# Patient Record
Sex: FEMALE | Race: WHITE | Employment: FULL TIME | URBAN - METROPOLITAN AREA
[De-identification: names, ages, dates, MRNs, and addresses within clinical notes are randomized per-mention and may not be internally consistent; named-entity substitution may affect disease eponyms.]

---

## 2017-01-19 ENCOUNTER — TELEPHONE (OUTPATIENT)
Dept: FAMILY MEDICINE | Facility: CLINIC | Age: 57
End: 2017-01-19

## 2017-01-19 DIAGNOSIS — K21.9 GASTROESOPHAGEAL REFLUX DISEASE, ESOPHAGITIS PRESENCE NOT SPECIFIED: Primary | ICD-10-CM

## 2017-01-19 NOTE — TELEPHONE ENCOUNTER
Breezy Joseph sent a message stating the Lansoprazole 15mg Capsules require a PA.  PA completed through cover my meds, I will wait for response from insurance thank you.    Lianna Baugh Certified Medical Assistant / [facility]  Clinic Ancillary  Advance Care Facilitator

## 2017-01-20 ENCOUNTER — TELEPHONE (OUTPATIENT)
Dept: FAMILY MEDICINE | Facility: CLINIC | Age: 57
End: 2017-01-20

## 2017-01-20 NOTE — Clinical Note
January 20, 2017    Jorge Abarca  [address] [place] [address] [place] [address]    Dear Jorge    We care about your health and have reviewed your health plan. We have reviewed your medical conditions, medication list, and lab results and are making recommendations based on this review, to better manage your health.    You are in particular need of attention regarding:  - Scheduling a Physical with a Cervical Cancer Screening (Pap Smear) age 64 and younger [phone]      Here is a list of Health Maintenance topics that are due now or due soon:  Health Maintenance Due   Topic Date Due     DIABETIC EDUCATION Q1 YEAR (NO INBASKET)  08/30/1959     FIT Q1 YR (NO INBASKET)  08/30/1968     OP ANNUAL INR REFERRAL  08/01/2012     PAP Q3 YR  05/24/2016     INFLUENZA VACCINE (SYSTEM ASSIGNED)  09/01/2016     ASTHMA ACTION PLAN Q1 YR (NO INBASKET)  01/04/2017     We will be calling you in the next couple of weeks to help you schedule any appointments that are needed.  Please call us at [phone] (or use [employer]) to address the above recommendations.     Thank you for trusting [facility] and we appreciate the opportunity to serve you.  We look forward to supporting your healthcare needs in the future.    Healthy Regards,

## 2017-01-20 NOTE — Clinical Note
January 25, 2017    Jorge Abarca  [address] [place] [address] [place] [address]      Dear Jorge Abarca,     We have tried to contact you about your health, but have been unable to reach you.  Please call us as soon as possible so we can provide you with the best care possible.  We will continue to check in with you throughout the year to complete these items of care, if you are not able to complete these items at this time.  If you would like to complete the missing items for your care, please contact us at [phone].    We recommend the following:  -schedule a PAP SMEAR EXAM which is due.  Please disregard this reminder if you have had this exam elsewhere within the last year.  It would be helpful for us to have a copy of your recent pap smear report in our file so that we can best coordinate your care.    Sincerely,     Your Care Team at [address]

## 2017-01-20 NOTE — TELEPHONE ENCOUNTER
Panel Management Review      Patient has the following on her problem list:     Asthma review     ACT Total Scores 11/30/2016   ACT TOTAL SCORE -   ASTHMA ER VISITS -   ASTHMA HOSPITALIZATIONS -   ACT TOTAL SCORE (Goal Greater than or Equal to 20) 25   In the past 12 months, how many times did you visit the emergency room for your asthma without being admitted to the hospital? 0   In the past 12 months, how many times were you hospitalized overnight because of your asthma? 0      1. Is Asthma diagnosis on the Problem List? Yes    2. Is Asthma listed on Health Maintenance? Yes    3. Patient is due for:  ACT    Diabetes    ASA: Not Required     Last A1C  A1C      6.0   11/23/2016  A1C      6.1   10/26/2015  A1C      6.8   3/11/2015  A1C      9.0   12/19/2014  A1C     12.6   10/15/2014  A1C tested:     Last LDL:    CHOL      153   11/23/2016  HDL       70   11/23/2016  LDL       68   11/23/2016  TRIG       73   11/23/2016  CHOLHDLRATIO      3.4   12/19/2014  NHDL       83   11/23/2016    Is the patient on a Statin? NO             Is the patient on Aspirin? NO    Medications     HMG CoA Reductase Inhibitors    simvastatin (ZOCOR) 10 MG tablet    Salicylates    aspirin 81 MG tablet          Last three blood pressure readings:  BP Readings from Last 3 Encounters:   12/21/16 132/76   11/30/16 150/90   09/07/16 132/88       Date of last diabetes office visit: 11/30/2016     Tobacco History:     History   Smoking status     Never Smoker    Smokeless tobacco     Never Used             Composite cancer screening  Chart review shows that this patient is due/due soon for the following Pap Smear  Summary:    Patient is due/failing the following:   PAP and PHYSICAL    Action needed:   Patient needs office visit for physical and pap and FIT test.    Type of outreach:    Phone, left message for patient to call back.     Questions for provider review:    None                                                                                                                                     Allyssa Castillo CMA       Chart routed to Care Team .

## 2017-01-25 NOTE — TELEPHONE ENCOUNTER
Spoke to the patient and she declined at this time. She said that she would call when she would like to schedule   Allyssa Castillo CMA

## 2017-02-13 ENCOUNTER — TELEPHONE (OUTPATIENT)
Dept: FAMILY MEDICINE | Facility: CLINIC | Age: 57
End: 2017-02-13

## 2017-02-16 NOTE — TELEPHONE ENCOUNTER
I went on to cover my meds and questions were needing to be answered but PA stated questions have  and new PA will have to be started if needed  Lizzy Resendiz CMA

## 2017-02-17 RX ORDER — PANTOPRAZOLE SODIUM 20 MG/1
TABLET, DELAYED RELEASE ORAL
Qty: 90 TABLET | Refills: 3 | Status: SHIPPED | OUTPATIENT
Start: 2017-02-17 | End: 2017-08-16

## 2017-02-17 NOTE — TELEPHONE ENCOUNTER
I re submitted PA through cover my meds. Will wait for response from insurance.  Lizzy Resendiz CMA

## 2017-02-17 NOTE — TELEPHONE ENCOUNTER
I received a fax back from insurance PA is denied due to medication is one that is excluded from coverage based on the terms of health plan. I will send to Dr. Varela for further review.  Lizzy Resendiz CMA

## 2017-02-18 NOTE — TELEPHONE ENCOUNTER
I did send in an alternative pantoprazole, but please warn patient that since many of the meds in this class are now over the counter, insurance may not cover any of them.  Might still be good to have it sent in as prescription and then she could just pay out of pocket if not covered.    Please inform patient.    Coy Varela MD

## 2017-02-21 DIAGNOSIS — I10 ESSENTIAL HYPERTENSION WITH GOAL BLOOD PRESSURE LESS THAN 140/90: ICD-10-CM

## 2017-02-21 RX ORDER — HYDRALAZINE HYDROCHLORIDE 50 MG/1
50 TABLET, FILM COATED ORAL 3 TIMES DAILY
Qty: 90 TABLET | Refills: 1 | Status: SHIPPED | OUTPATIENT
Start: 2017-02-21 | End: 2017-05-05

## 2017-02-21 NOTE — TELEPHONE ENCOUNTER
Prescription approved per [facility] Refill Protocol.    Velia Bond RN  [facility]

## 2017-02-21 NOTE — TELEPHONE ENCOUNTER
hydrALAZINE (APRESOLINE) 50 MG tablet      Last Written Prescription Date: 11-30-16  Last Fill Quantity: 90, # refills: 1    Last Office Visit with [facility], [facility] or [facility] prescribing provider:  11-30-16   Future Office Visit:        BP Readings from Last 3 Encounters:   12/21/16 132/76   11/30/16 150/90   09/07/16 132/88

## 2017-03-21 DIAGNOSIS — M06.00 SERONEGATIVE RHEUMATOID ARTHRITIS (H): ICD-10-CM

## 2017-03-21 DIAGNOSIS — Z79.899 HIGH RISK MEDICATIONS (NOT ANTICOAGULANTS) LONG-TERM USE: ICD-10-CM

## 2017-03-21 LAB
ALBUMIN SERPL-MCNC: 3.9 G/DL (ref 3.4–5)
ALP SERPL-CCNC: 71 U/L (ref 40–150)
ALT SERPL W P-5'-P-CCNC: 30 U/L (ref 0–50)
AST SERPL W P-5'-P-CCNC: 26 U/L (ref 0–45)
BASOPHILS # BLD AUTO: 0 10E9/L (ref 0–0.2)
BASOPHILS NFR BLD AUTO: 0.4 %
BILIRUB DIRECT SERPL-MCNC: <0.1 MG/DL (ref 0–0.2)
BILIRUB SERPL-MCNC: 0.3 MG/DL (ref 0.2–1.3)
CREAT SERPL-MCNC: 0.81 MG/DL (ref 0.52–1.04)
DIFFERENTIAL METHOD BLD: ABNORMAL
EOSINOPHIL # BLD AUTO: 0.6 10E9/L (ref 0–0.7)
EOSINOPHIL NFR BLD AUTO: 7.4 %
ERYTHROCYTE [DISTWIDTH] IN BLOOD BY AUTOMATED COUNT: 15.1 % (ref 10–15)
GFR SERPL CREATININE-BSD FRML MDRD: 72 ML/MIN/1.7M2
HCT VFR BLD AUTO: 34.7 % (ref 35–47)
HGB BLD-MCNC: 11.3 G/DL (ref 11.7–15.7)
LYMPHOCYTES # BLD AUTO: 1.6 10E9/L (ref 0.8–5.3)
LYMPHOCYTES NFR BLD AUTO: 20.7 %
MCH RBC QN AUTO: 30.5 PG (ref 26.5–33)
MCHC RBC AUTO-ENTMCNC: 32.6 G/DL (ref 31.5–36.5)
MCV RBC AUTO: 94 FL (ref 78–100)
MONOCYTES # BLD AUTO: 0.6 10E9/L (ref 0–1.3)
MONOCYTES NFR BLD AUTO: 7.4 %
NEUTROPHILS # BLD AUTO: 4.8 10E9/L (ref 1.6–8.3)
NEUTROPHILS NFR BLD AUTO: 64.1 %
PLATELET # BLD AUTO: 302 10E9/L (ref 150–450)
PROT SERPL-MCNC: 7.4 G/DL (ref 6.8–8.8)
RBC # BLD AUTO: 3.7 10E12/L (ref 3.8–5.2)
WBC # BLD AUTO: 7.6 10E9/L (ref 4–11)

## 2017-03-21 PROCEDURE — 36415 COLL VENOUS BLD VENIPUNCTURE: CPT | Performed by: INTERNAL MEDICINE

## 2017-03-21 PROCEDURE — 82565 ASSAY OF CREATININE: CPT | Performed by: INTERNAL MEDICINE

## 2017-03-21 PROCEDURE — 80076 HEPATIC FUNCTION PANEL: CPT | Performed by: INTERNAL MEDICINE

## 2017-03-21 PROCEDURE — 85025 COMPLETE CBC W/AUTO DIFF WBC: CPT | Performed by: INTERNAL MEDICINE

## 2017-03-24 NOTE — PROGRESS NOTES
"[employer] message sent:  \"Ms. Abarca,    Labs were normal except for a reduction in hemoglobin. This will be reevaluated with your next labs.    Sincerely,  Chris Capellan MD  3/24/2017 7:05 AM\""

## 2017-05-05 DIAGNOSIS — I10 ESSENTIAL HYPERTENSION WITH GOAL BLOOD PRESSURE LESS THAN 140/90: ICD-10-CM

## 2017-05-05 RX ORDER — HYDRALAZINE HYDROCHLORIDE 50 MG/1
TABLET, FILM COATED ORAL
Qty: 90 TABLET | Refills: 0 | Status: SHIPPED | OUTPATIENT
Start: 2017-05-05 | End: 2017-07-09

## 2017-05-05 NOTE — TELEPHONE ENCOUNTER
hydrALAZINE (APRESOLINE) 50 MG tablet      Last Written Prescription Date: 2/21/17  Last Fill Quantity: 90, # refills: 1    Last Office Visit with [facility], [facility] or [facility] prescribing provider:  11/30/16   Future Office Visit:    Next 5 appointments (look out 90 days)     Jun 21, 2017  3:40 PM CDT   Return Visit with Chris Capellan MD   [facility] [place] ([facility])    [address]  [place] [address]   [phone]                    BP Readings from Last 3 Encounters:   12/21/16 132/76   11/30/16 150/90   09/07/16 132/88

## 2017-05-05 NOTE — TELEPHONE ENCOUNTER
Prescription approved per [facility] Refill Protocol.    Velia Bond RN  [facility]

## 2017-06-09 DIAGNOSIS — L29.9 ITCHY SKIN: ICD-10-CM

## 2017-06-09 PROBLEM — J30.2 SEASONAL ALLERGIC RHINITIS, UNSPECIFIED ALLERGIC RHINITIS TRIGGER: Status: ACTIVE | Noted: 2017-06-09

## 2017-06-09 RX ORDER — CETIRIZINE HYDROCHLORIDE 10 MG/1
TABLET ORAL
Qty: 60 TABLET | Refills: 1 | Status: SHIPPED | OUTPATIENT
Start: 2017-06-09 | End: 2017-08-16

## 2017-06-09 NOTE — TELEPHONE ENCOUNTER
Prescription approved per [facility] Refill Protocol.  Shahana Alas, RN [facility] Triage.

## 2017-06-09 NOTE — TELEPHONE ENCOUNTER
cetirizine (ZYRTEC ALLERGY) 10 MG tablet      Last Written Prescription Date: 6/6/16  Last Fill Quantity: 60,  # refills: 11   Last Office Visit with [facility], [facility] or [facility] prescribing provider: 11/30/16                                         Next 5 appointments (look out 90 days)     Jun 28, 2017 11:40 AM CDT   Return Visit with Chris Capellan MD   [facility] [place] ([facility])    [address]  [place] [address]   [phone]

## 2017-06-21 DIAGNOSIS — M06.00 SERONEGATIVE RHEUMATOID ARTHRITIS (H): ICD-10-CM

## 2017-06-21 DIAGNOSIS — Z79.899 HIGH RISK MEDICATIONS (NOT ANTICOAGULANTS) LONG-TERM USE: ICD-10-CM

## 2017-06-21 LAB
ALBUMIN SERPL-MCNC: 4.1 G/DL (ref 3.4–5)
ALP SERPL-CCNC: 70 U/L (ref 40–150)
ALT SERPL W P-5'-P-CCNC: 36 U/L (ref 0–50)
AST SERPL W P-5'-P-CCNC: 29 U/L (ref 0–45)
BASOPHILS # BLD AUTO: 0 10E9/L (ref 0–0.2)
BASOPHILS NFR BLD AUTO: 0.4 %
BILIRUB DIRECT SERPL-MCNC: 0.1 MG/DL (ref 0–0.2)
BILIRUB SERPL-MCNC: 0.4 MG/DL (ref 0.2–1.3)
CREAT SERPL-MCNC: 1.12 MG/DL (ref 0.52–1.04)
DIFFERENTIAL METHOD BLD: ABNORMAL
EOSINOPHIL # BLD AUTO: 0.4 10E9/L (ref 0–0.7)
EOSINOPHIL NFR BLD AUTO: 5.8 %
ERYTHROCYTE [DISTWIDTH] IN BLOOD BY AUTOMATED COUNT: 16 % (ref 10–15)
GFR SERPL CREATININE-BSD FRML MDRD: 50 ML/MIN/1.7M2
HCT VFR BLD AUTO: 38.7 % (ref 35–47)
HGB BLD-MCNC: 12.8 G/DL (ref 11.7–15.7)
LYMPHOCYTES # BLD AUTO: 1.4 10E9/L (ref 0.8–5.3)
LYMPHOCYTES NFR BLD AUTO: 18 %
MCH RBC QN AUTO: 30.4 PG (ref 26.5–33)
MCHC RBC AUTO-ENTMCNC: 33.1 G/DL (ref 31.5–36.5)
MCV RBC AUTO: 92 FL (ref 78–100)
MONOCYTES # BLD AUTO: 0.6 10E9/L (ref 0–1.3)
MONOCYTES NFR BLD AUTO: 7.3 %
NEUTROPHILS # BLD AUTO: 5.2 10E9/L (ref 1.6–8.3)
NEUTROPHILS NFR BLD AUTO: 68.5 %
PLATELET # BLD AUTO: 308 10E9/L (ref 150–450)
PROT SERPL-MCNC: 7.4 G/DL (ref 6.8–8.8)
RBC # BLD AUTO: 4.21 10E12/L (ref 3.8–5.2)
WBC # BLD AUTO: 7.5 10E9/L (ref 4–11)

## 2017-06-21 PROCEDURE — 36415 COLL VENOUS BLD VENIPUNCTURE: CPT | Performed by: INTERNAL MEDICINE

## 2017-06-21 PROCEDURE — 85025 COMPLETE CBC W/AUTO DIFF WBC: CPT | Performed by: INTERNAL MEDICINE

## 2017-06-21 PROCEDURE — 82565 ASSAY OF CREATININE: CPT | Performed by: INTERNAL MEDICINE

## 2017-06-21 PROCEDURE — 80076 HEPATIC FUNCTION PANEL: CPT | Performed by: INTERNAL MEDICINE

## 2017-06-28 ENCOUNTER — OFFICE VISIT (OUTPATIENT)
Dept: RHEUMATOLOGY | Facility: CLINIC | Age: 57
End: 2017-06-28
Payer: COMMERCIAL

## 2017-06-28 VITALS
WEIGHT: 215.2 LBS | BODY MASS INDEX: 34.73 KG/M2 | TEMPERATURE: 97.7 F | DIASTOLIC BLOOD PRESSURE: 68 MMHG | OXYGEN SATURATION: 99 % | SYSTOLIC BLOOD PRESSURE: 136 MMHG | HEART RATE: 95 BPM

## 2017-06-28 DIAGNOSIS — R79.89 ELEVATED SERUM CREATININE: ICD-10-CM

## 2017-06-28 DIAGNOSIS — M65.332 TRIGGER MIDDLE FINGER OF LEFT HAND: ICD-10-CM

## 2017-06-28 DIAGNOSIS — Z79.899 HIGH RISK MEDICATIONS (NOT ANTICOAGULANTS) LONG-TERM USE: ICD-10-CM

## 2017-06-28 DIAGNOSIS — M06.00 SERONEGATIVE RHEUMATOID ARTHRITIS (H): Primary | ICD-10-CM

## 2017-06-28 LAB
ANION GAP SERPL CALCULATED.3IONS-SCNC: 9 MMOL/L (ref 3–14)
BUN SERPL-MCNC: 16 MG/DL (ref 7–30)
CALCIUM SERPL-MCNC: 9.9 MG/DL (ref 8.5–10.1)
CHLORIDE SERPL-SCNC: 105 MMOL/L (ref 94–109)
CO2 SERPL-SCNC: 29 MMOL/L (ref 20–32)
CREAT SERPL-MCNC: 0.99 MG/DL (ref 0.52–1.04)
GFR SERPL CREATININE-BSD FRML MDRD: 57 ML/MIN/1.7M2
GLUCOSE SERPL-MCNC: 87 MG/DL (ref 70–99)
POTASSIUM SERPL-SCNC: 4.3 MMOL/L (ref 3.4–5.3)
SODIUM SERPL-SCNC: 143 MMOL/L (ref 133–144)

## 2017-06-28 PROCEDURE — 99213 OFFICE O/P EST LOW 20 MIN: CPT | Mod: 25 | Performed by: INTERNAL MEDICINE

## 2017-06-28 PROCEDURE — 80048 BASIC METABOLIC PNL TOTAL CA: CPT | Performed by: INTERNAL MEDICINE

## 2017-06-28 PROCEDURE — 20550 NJX 1 TENDON SHEATH/LIGAMENT: CPT | Mod: F2 | Performed by: INTERNAL MEDICINE

## 2017-06-28 PROCEDURE — 36415 COLL VENOUS BLD VENIPUNCTURE: CPT | Performed by: INTERNAL MEDICINE

## 2017-06-28 RX ORDER — METHOTREXATE 2.5 MG/1
20 TABLET ORAL WEEKLY
Qty: 96 TABLET | Refills: 1 | Status: SHIPPED | OUTPATIENT
Start: 2017-06-28 | End: 2017-12-22

## 2017-06-28 RX ORDER — METHYLPREDNISOLONE ACETATE 40 MG/ML
10 INJECTION, SUSPENSION INTRA-ARTICULAR; INTRALESIONAL; INTRAMUSCULAR; SOFT TISSUE ONCE
Qty: 0.25 ML | Refills: 0 | OUTPATIENT
Start: 2017-06-28 | End: 2017-06-28

## 2017-06-28 RX ORDER — HYDROXYCHLOROQUINE SULFATE 200 MG/1
400 TABLET, FILM COATED ORAL DAILY
Qty: 180 TABLET | Refills: 3 | Status: SHIPPED | OUTPATIENT
Start: 2017-06-28 | End: 2018-01-03

## 2017-06-28 RX ORDER — FOLIC ACID 1 MG/1
1 TABLET ORAL DAILY
Qty: 100 TABLET | Refills: 3 | Status: SHIPPED | OUTPATIENT
Start: 2017-06-28 | End: 2018-04-04

## 2017-06-28 NOTE — NURSING NOTE
The following medication was given:     MEDICATION: Depo Medrol 40mg  SITE: Left 3rd digit trigger finger  DOSE: 1 ml  LOT #: [id number]  :  Giovanni LEYIO Annia  EXPIRATION DATE:  07/01/2018  NDC#: 9458-1792-32

## 2017-06-28 NOTE — NURSING NOTE
"Chief Complaint   Patient presents with     RECHECK     6 month follow up for RA pt states \"has been feeling okay but her finger has been bothering her again x2 weeks\"     Refill Request       Initial /68 (BP Location: Right arm, Patient Position: Chair, Cuff Size: Adult Large)  Pulse 95  Temp 97.7  F (36.5  C) (Oral)  Wt 97.6 kg (215 lb 3.2 oz)  LMP 03/04/2011  SpO2 99%  BMI 34.73 kg/m2 Estimated body mass index is 34.73 kg/(m^2) as calculated from the following:    Height as of 12/21/16: 1.676 m (5' 6\").    Weight as of this encounter: 97.6 kg (215 lb 3.2 oz).  BP completed using cuff size: large         RAPID3 (0-30) Cumulative Score  1.0          RAPID3 Weighted Score (divide #4 by 3 and that is the weighted score)  0.33     Iram Cespedes, ADOLPH 6/28/2017 12:02 PM      "

## 2017-06-28 NOTE — PATIENT INSTRUCTIONS
Dr. Capellan s Rheumatology Clinics  Locations Clinic Hours Telephone Number   [facility] [place]  [address]  [place] [address]     Wednesday: 7:20AM - 4:00PM  Thursday:     7:20AM - 4:00PM     Friday:          7:20AM - 11:00AM       To schedule an appointment with  Dr. Capellan,  please contact  Specialty [phone]       Mario [place]  [address]  [place] [address]       Monday:       7:20AM - 4:00PM      Mario [facility]  [address]  [place] [address]       Tuesday:      7:20AM - 4:00PM          Thank you!    Patricia Rizzo CMA

## 2017-06-28 NOTE — MR AVS SNAPSHOT
After Visit Summary   2017    Jorge Abarca    MRN: [id number]           Patient Information     Date Of Birth          1960        Visit Information        Provider Department      2017 11:40 AM Chris Capellan MD [facility] [place]        Today's Diagnoses     Seronegative rheumatoid arthritis (H)    -  1    High risk medications (not anticoagulants) long-term use        Elevated serum creatinine          Care Instructions      Dr. Capellan s Rheumatology Clinics  Locations Clinic Hours Telephone Number   [facility] [place]  [address]  [place] [address]     Wednesday: 7:20AM - 4:00PM  Thursday:     7:20AM - 4:00PM     Friday:          7:20AM - 11:00AM       To schedule an appointment with  Dr. Capellan,  please contact  Specialty [phone]       M[place]  [address]  [place] [address]       Monday:       7:20AM - 4:00PM      [facility]  [address] [place] [address]       Tuesday:      7:20AM - 4:00PM          Thank you!    Patricia Rizzo CMA              Follow-ups after your visit        Your next 10 appointments already scheduled     Sep 27, 2017  3:30 PM CDT   LAB with [facility]   [place] ([facility] [place])    [address]  [place] [address] [phone]           Patient must bring picture ID.  Patient should be prepared to give a urine specimen  Please do not eat 10-12 hours before your appointment if you are coming in fasting for labs on lipids, cholesterol, or glucose (sugar).  Pregnant women should follow their Care Team instructions. Water with medications is okay. Do not drink coffee or other fluids.   If you have concerns about taking  your medications, please ask at office or if scheduling via [employer], send a message by clicking on Secure Messaging, Message Your Care Team.            Dec 21, 2017  3:30 PM CST   LAB with FZ LAB   [facility] [place]  ([facility] [place])    [address]  [place] [address]   [phone]           Patient must bring picture ID.  Patient should be prepared to give a urine specimen  Please do not eat 10-12 hours before your appointment if you are coming in fasting for labs on lipids, cholesterol, or glucose (sugar).  Pregnant women should follow their Care Team instructions. Water with medications is okay. Do not drink coffee or other fluids.   If you have concerns about taking  your medications, please ask at office or if scheduling via [employer], send a message by clicking on Secure Messaging, Message Your Care Team.            Dec 27, 2017 11:40 AM CST   Return Visit with Chris Capellan MD   [facility] [place] [facility]    [address]  [place] [address] [phone]              Future tests that were ordered for you today     Open Future Orders        Priority Expected Expires Ordered    CBC with platelets differential Routine 12/25/2017 1/13/2018 6/28/2017    Creatinine Routine 12/25/2017 1/13/2018 6/28/2017    CRP inflammation Routine 12/25/2017 1/13/2018 6/28/2017    Erythrocyte sedimentation rate auto Routine 12/25/2017 1/13/2018 6/28/2017    Hepatic panel Routine 12/25/2017 1/13/2018 6/28/2017    Hepatic panel Routine 9/22/2017 10/11/2017 6/28/2017    CRP inflammation Routine 9/22/2017 10/11/2017 6/28/2017    Erythrocyte sedimentation rate auto Routine 9/22/2017 10/11/2017 6/28/2017    Creatinine Routine 9/22/2017 10/11/2017 6/28/2017    CBC with platelets differential Routine 9/22/2017 10/11/2017 6/28/2017            Who to contact     If you have questions or need follow up information about today's clinic visit or your schedule please contact [facility] [place] directly at [phone].  Normal or non-critical lab and imaging results will be communicated to you by MyChart, letter or phone within 4 business days after the clinic has received the results. If  you do not hear from us within 7 days, please contact the clinic through [employer] or phone. If you have a critical or abnormal lab result, we will notify you by phone as soon as possible.  Submit refill requests through [employer] or call your pharmacy and they will forward the refill request to us. Please allow 3 business days for your refill to be completed.          Additional Information About Your Visit        [employer] Information     [employer] gives you secure access to your electronic health record. If you see a primary care provider, you can also send messages to your care team and make appointments. If you have questions, please call your primary care clinic.  If you do not have a primary care provider, please call [phone] and they will assist you.        Care EveryWhere ID     This is your Care EveryWhere ID. This could be used by other organizations to access your [facility] medical records  [id number]        Your Vitals Were     Pulse Temperature Last Period Pulse Oximetry BMI (Body Mass Index)       95 97.7  F (36.5  C) (Oral) 03/04/2011 99% 34.73 kg/m2        Blood Pressure from Last 3 Encounters:   06/28/17 136/68   12/21/16 132/76   11/30/16 150/90    Weight from Last 3 Encounters:   06/28/17 97.6 kg (215 lb 3.2 oz)   12/21/16 94.7 kg (208 lb 12.8 oz)   11/30/16 95.3 kg (210 lb)              We Performed the Following     Basic metabolic panel  (Ca, Cl, CO2, Creat, Gluc, K, Na, BUN)          Where to get your medicines      These medications were sent to [id number] - [place] - [address]  [address], [place] [address]    Hours:  Test fax successful 12/11/02  KR Phone:  [phone]     folic acid 1 MG tablet    hydroxychloroquine 200 MG tablet    methotrexate sodium 2.5 MG Tabs          Primary Care Provider Office Phone # Fax #    Coy Varela [phone]       [address]  [facility] [address]        Equal Access to Services     CUCO AVALOS : Hadii isra ku angela Sosandritaali, waaxda luqadaha, qaybta kaalmada kodi joseph. So [facility] [phone].    ATENCIÓN: Si habla español, tiene a murphy disposición servicios gratuitos de asistencia lingüística. Rasame al [phone].    We comply with applicable federal civil rights laws and [place] laws. We do not discriminate on the basis of race, color, national origin, age, disability sex, sexual orientation or gender identity.            Thank you!     Thank you for choosing [facility]  for your care. Our goal is always to provide you with excellent care. Hearing back from our patients is one way we can continue to improve our services. Please take a few minutes to complete the written survey that you may receive in the mail after your visit with us. Thank you!             Your Updated Medication List - Protect others around you: Learn how to safely use, store and throw away your medicines at www.disposemymeds.org.          This list is accurate as of: 6/28/17 12:18 PM.  Always use your most recent med list.                   Brand Name Dispense Instructions for use Diagnosis    aspirin 81 MG tablet     30 tablet    Take 1 tablet (81 mg) by mouth daily    Type 2 diabetes mellitus without complication, without long-term current use of insulin (H)       blood glucose monitoring meter device kit    no brand specified    1 kit    Use to test blood sugar 3 times daily or as directed.  Patient has very high sugars, last hemoglobin a1c over 12  Needs new meter and okay to ongoing refills on strips and lancets    DM type 2, goal A1c below 7       blood glucose monitoring test strip    no brand specified    3 Month    Use to test blood sugar 1 times daily or as directed. One touch ultra test strips    DM type 2, goal A1c below 7       CALCIUM 1200 PO      Take  by mouth.        cetirizine 10 MG  tablet    ZYRTEC ALLERGY    60 tablet    Take 1 to 2 po qd as needed.    Itchy skin       clobetasol 0.05 % ointment    TEMOVATE    30 g    Apply topically 2 times daily as needed    Eczema       CRANBERRY           DAILY MULTIVITAMIN PO      Take  by mouth.        FISH OIL           fluticasone 220 MCG/ACT Inhaler    FLOVENT HFA    1 Inhaler    Inhale 2 puffs into the lungs daily    Mild persistent asthma, uncomplicated       folic acid 1 MG tablet    FOLVITE    100 tablet    Take 1 tablet (1 mg) by mouth daily    Seronegative rheumatoid arthritis (H)       hydrALAZINE 50 MG tablet    APRESOLINE    90 tablet    TAKE 1 TABLET(50 MG) BY MOUTH THREE TIMES DAILY    Essential hypertension with goal blood pressure less than 140/90       hydroxychloroquine 200 MG tablet    PLAQUENIL    180 tablet    Take 2 tablets (400 mg) by mouth daily    Seronegative rheumatoid arthritis (H)       hydrOXYzine 25 MG tablet    ATARAX    60 tablet    TAKE 1 TO 2 TABLETS BY MOUTH EVERY NIGHT AT BEDTIME AS NEEDED FOR ITCH    Itchy skin, Seasonal allergic rhinitis, unspecified allergic rhinitis trigger       ibuprofen 200 MG capsule      Take 200 mg by mouth every 4 hours as needed.        LANsoprazole 15 MG CR capsule    PREVACID    90 capsule    Take 1 capsule (15 mg) by mouth daily    Gastroesophageal reflux disease without esophagitis       levalbuterol 45 MCG/ACT Inhaler    XOPENEX HFA    1 Inhaler    Inhale 2 puffs into the lungs every 4 hours as needed    Mild persistent asthma, uncomplicated       losartan 100 MG tablet    COZAAR    90 tablet    Take 1 tablet (100 mg) by mouth daily    Essential hypertension with goal blood pressure less than 140/90       metFORMIN 1000 MG tablet    GLUCOPHAGE    180 tablet    Take 1 tablet (1,000 mg) by mouth 2 times daily (with meals)    Type 2 diabetes mellitus without complication, without long-term current use of insulin (H)       methotrexate sodium 2.5 MG Tabs     96 tablet    Take 20 mg by  mouth once a week . Take all 8 tablets on the same day of each week.    Seronegative rheumatoid arthritis (H)       pantoprazole 20 MG EC tablet    PROTONIX    90 tablet    Take by mouth 30-60 minutes before a meal.    Gastroesophageal reflux disease, esophagitis presence not specified       simvastatin 10 MG tablet    ZOCOR    90 tablet    Take 1 tablet (10 mg) by mouth At Bedtime    Hyperlipidemia LDL goal <100       tacrolimus 0.1 % ointment    PROTOPIC    60 g    Apply to the face daily, already failed desonide    AD (atopic dermatitis)       triamcinolone 0.1 % ointment    KENALOG    80 g    At home mix with 8 ounces of Vanicream, apply to the entire body neck down daily    AD (atopic dermatitis)       triamcinolone 55 MCG/ACT Inhaler    NASACORT     Spray 2 sprays into both nostrils daily    Seasonal allergic rhinitis       VITAMIN C PO      Take  by mouth.        VITAMIN D PO      Take  by mouth.        ZADITOR 0.025 % Soln ophthalmic solution   Generic drug:  ketotifen      1 drop 2 times daily as needed    Seasonal allergic conjunctivitis

## 2017-06-28 NOTE — PROGRESS NOTES
"Rheumatology Clinic Visit      Jorge Abarca MRN# [id number]   YOB: 1960 Age: 56 year old      Date of visit: 6/28/17   PCP: Dr. Coy Varela  Ophthalmology: [facility] [place]     Chief Complaint   Patient presents with:  RECHECK: 6 month follow up for RA pt states \"has been feeling okay but her finger has been bothering her again x2 weeks\"      Assessment and Plan   1. Seronegative nonerosive rheumatoid arthritis (RF negative, CCP negative): Symptoms began May 2015 and progressively worsened; initially with symmetric synovitis of the PIPs and MCPs and morning stiffness > 1 hour; steroid responsive. Currently on MTX 20mg PO weekly, folic acid 1mg daily, and HCQ 400mg daily.  Doing well today.  - Continue methotrexate 20mg once weekly  - Continue hydroxychloroquine 400mg daily (ophthalmology exam last on 12/5/2016)  - Continue folic acid 1mg daily  - Labs in 3 months, and 2-3 days prior to the next rheumatology clinic visit: CBC, Creatinine, Hepatic Panel              Rapid 3, cumulative scores                      06/28/2017: 1    (MTX 20mg wkly and HCQ 400mg daily)                      12/21/2016: 1    (MTX 20mg wkly and HCQ 400mg daily)                      09/07/2016: 1.5 (MTX 20mg wkly and HCQ 400mg daily)    2. Left 3rd digit trigger finger: Steroid injection on 11/17/2015 with resolution for several months, then again in September 2016 with resolution of it again. Recurrence for the past 2-3 weeks. Repeat steroid injection today as documented in the procedure section.     3. Hypertersion: Blood pressure checked this clinic visit and was reasonable.     4. BMI 33.72, Obesity: Encouraged weight loss       5. History of DVT: reportedly associated with birth control; no recurrence    6. Bone Health: 11/2015 Vitamin D level normal.  Normal DEXA in 2011.     7. Elevated Creatinine: recheck labs today  - Lab today: BMP    Ms. Abarca verbalized agreement with and understanding of the " rational for the diagnosis and treatment plan.  All questions were answered to best of my ability and the patient's satisfaction. Ms. Abarca was advised to contact the clinic with any questions that may arise after the clinic visit.      Thank you for involving me in the care of the patient    Return to clinic: 6 months    HPI   Jorge Abarca is a 56 year old female with a medical history significant for GERD, diabetes, hypertension, hyperlipidemia, lumbar degenerative disc disease s/p L4-L5 microdiskectomy in , left first toe fracture s/p nonsurgical treatment, allergic rhinitis (receiving allergy shots), asthma, and history of DVT who presents for f/u of seronegative rheumatoid arthritis.     Today, Ms. Abarca reports that she is doing well. Morning stiffness for no more than 10-15 minutes. No joint pain. For the past 2-3 weeks she has had triggering of her left third finger; she would like to have a steroid injection of this as it has helped in the past. Tolerating her medications well. She has not missed any doses of her medication.     She tells me that her   in the last week and she has her sister with her today at the clinic visit because she is staying with her right now.    Denies fevers, chills, nausea, vomiting, diarrhea. Chronic on and off constipation. No abdominal pain. No chest pain/pressure, palpitations, or shortness of breath. No oral or nasal sores. No neck pain. No LE swelling. No dry mouth. Dry eyes that significantly improved with infrequent use of artificial tears.  No eye pain.    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   SKIN: See history of present illness  HEENT:  No epistaxis. No oral or nasal ulcers.  CV: No chest pain, pressure, palpitations, or dyspnea on exertion.  PULM: No SOB, wheeze, cough.  GI:  No nausea, vomiting, diarrhea. See history of present illness. No blood in  stool. No abdominal pain.    : No blood in urine.  MSK: See HPI.  NEURO: No numbness, tingling, or weakness.  EXT: No LE swelling    Active Problem List     Patient Active Problem List   Diagnosis     GERD (gastroesophageal reflux disease)     Hypertension goal BP (blood pressure) < 140/90     Allergy to mold spores     House dust mite allergy     Allergic rhinitis due to animal dander     Seasonal allergic rhinitis     Need for desensitization to allergens     Diagnostic skin and sensitization tests     Hyperlipidemia LDL goal <100     Degenerative disc disease, lumbar     Lumbar disc herniation     History of DVT (deep vein thrombosis)     Mild persistent asthma without complication     Overweight     Seronegative rheumatoid arthritis (H)     High risk medications (not anticoagulants) long-term use     Type 2 diabetes mellitus without complication (H)     Trigger finger, left middle finger     Allergic rhinitis due to pollen     Allergic rhinitis due to dust mite     Allergic rhinitis due to mold     Type 2 diabetes mellitus without complication, without long-term current use of insulin (H)     Gastroesophageal reflux disease without esophagitis     Seasonal allergic rhinitis, unspecified allergic rhinitis trigger     Past Medical History     Past Medical History:   Diagnosis Date     Allergic rhinitis due to animal dander      Allergy to mold spores     7/03 skin tests per [facility]: pos. for cat/dog/CR/DM/M/T/G/W     Diagnostic skin and sensitization tests 7/03 skin tests per [facility]: pos. for cat/dog/CR/DM/M/T/G/W     Eczema     sees [facility]     GERD (gastroesophageal reflux disease)      High cholesterol     occ.     House dust mite allergy      HTN (hypertension)      Indigestion     uses OTC Zantac prn     Mild persistent asthma      Need for desensitization to allergens 9/03 started at [facility]. for: cat/dog/DM/M/T/G/W    start IT at [facility]: about 1/12      PONV (postoperative nausea and vomiting)      Seasonal  allergic rhinitis      Past Surgical History     Past Surgical History:   Procedure Laterality Date     [facility] AFF SURGICAL PATHOLOGY  1988    left foot     DISCECTOMY LUMBAR POSTERIOR MICROSCOPIC ONE LEVEL  3/18/2013    Procedure: DISCECTOMY LUMBAR POSTERIOR MICROSCOPIC ONE LEVEL;  Left Lumbar 4-5 Micro Discectomy;  Surgeon: Dallas Jensen MD;  Location: [facility]     TONSILLECTOMY & ADENOIDECTOMY  age 3     Allergy     Allergies   Allergen Reactions     Aleve [Naproxen Sodium] Hives     Nexium [Esomeprazole Magnesium Trihydrate] Hives     HIVES     Shellfish Allergy Nausea     Sulfa Drugs      Stomach upset     Amlodipine Besylate Rash     Hctz Rash     Lisinopril Rash     Current Medication List     Current Outpatient Prescriptions   Medication Sig     hydrOXYzine (ATARAX) 25 MG tablet TAKE 1 TO 2 TABLETS BY MOUTH EVERY NIGHT AT BEDTIME AS NEEDED FOR ITCH     cetirizine (ZYRTEC ALLERGY) 10 MG tablet Take 1 to 2 po qd as needed.     hydrALAZINE (APRESOLINE) 50 MG tablet TAKE 1 TABLET(50 MG) BY MOUTH THREE TIMES DAILY     pantoprazole (PROTONIX) 20 MG EC tablet Take by mouth 30-60 minutes before a meal.     methotrexate sodium 2.5 MG TABS Take 20 mg by mouth once a week . Take all 8 tablets on the same day of each week.     hydroxychloroquine (PLAQUENIL) 200 MG tablet Take 2 tablets (400 mg) by mouth daily     aspirin 81 MG tablet Take 1 tablet (81 mg) by mouth daily     metFORMIN (GLUCOPHAGE) 1000 MG tablet Take 1 tablet (1,000 mg) by mouth 2 times daily (with meals)     losartan (COZAAR) 100 MG tablet Take 1 tablet (100 mg) by mouth daily     simvastatin (ZOCOR) 10 MG tablet Take 1 tablet (10 mg) by mouth At Bedtime     folic acid (FOLVITE) 1 MG tablet Take 1 tablet (1 mg) by mouth daily     LANsoprazole (PREVACID) 15 MG capsule Take 1 capsule (15 mg) by mouth daily     fluticasone (FLOVENT HFA) 220 MCG/ACT inhaler Inhale 2 puffs into the lungs daily     blood glucose monitoring (NO BRAND SPECIFIED) test  strip Use to test blood sugar 1 times daily or as directed.  One touch ultra test strips     triamcinolone (KENALOG) 0.1 % ointment At home mix with 8 ounces of Vanicream, apply to the entire body neck down daily     tacrolimus (PROTOPIC) 0.1 % ointment Apply to the face daily, already failed desonide     triamcinolone (NASACORT) 55 MCG/ACT nasal inhaler Spray 2 sprays into both nostrils daily     levalbuterol (XOPENEX HFA) 45 MCG/ACT inhaler Inhale 2 puffs into the lungs every 4 hours as needed     clobetasol (TEMOVATE) 0.05 % ointment Apply topically 2 times daily as needed     blood glucose meter (NO BRAND SPECIFIED) meter device kit Use to test blood sugar 3 times daily or as directed.    Patient has very high sugars, last hemoglobin a1c over 12    Needs new meter and okay to ongoing refills on strips and lancets     ketotifen (ZADITOR) 0.025 % SOLN 1 drop 2 times daily as needed     [DISCONTINUED] amLODIPine (NORVASC) 5 MG tablet Take 1 tablet (5 mg) by mouth daily     CRANBERRY      FISH OIL      Cholecalciferol (VITAMIN D PO) Take  by mouth.     Ascorbic Acid (VITAMIN C PO) Take  by mouth.     Multiple Vitamin (DAILY MULTIVITAMIN PO) Take  by mouth.     Calcium Carbonate-Vit D-Min (CALCIUM 1200 PO) Take  by mouth.     ibuprofen 200 MG capsule Take 200 mg by mouth every 4 hours as needed.     No current facility-administered medications for this visit.      Social History   See HPI    Family History     Family History   Problem Relation Age of Onset     Cardiovascular Sister      atrial fibrillation     C.A.D. No family hx of      CEREBROVASCULAR DISEASE Brother      carotid stenosis     Autoimmune Disease No family hx of      CANCER Father      lung     DIABETES Mother      Breast Cancer Maternal Grandmother      also cousin on this side     Breast Cancer Paternal Aunt      Physical Exam     Temp Readings from Last 3 Encounters:   12/21/16 97.1  F (36.2  C) (Oral)   11/30/16 97.6  F (36.4  C) (Oral)  "  08/19/16 97.3  F (36.3  C) (Oral)     BP Readings from Last 5 Encounters:   06/28/17 136/68   12/21/16 132/76   11/30/16 150/90   09/07/16 132/88   08/19/16 138/88     Pulse Readings from Last 1 Encounters:   12/21/16 105     Resp Readings from Last 1 Encounters:   06/07/16 16     Estimated body mass index is 33.7 kg/(m^2) as calculated from the following:    Height as of 12/21/16: 1.676 m (5' 6\").    Weight as of 12/21/16: 94.7 kg (208 lb 12.8 oz).    GEN: NAD, overweight  HEENT: MMM. No oral lesions. Anicteric, non-injected sclera.  CV: S1, S2. RRR. No m/r/g.  PULM: CTA bilaterally. No w/c.  MSK: Hands, wrists, elbows, and shoulders without swelling or tenderness to palpation. Bump on the flexor tendon of the left third finger that moves with flexion/extension and causes triggering. Hips nontender to direct palpation. Knees, ankles, and feet without swelling or tenderness to palpation. Negative MTP squeeze.   NEURO: UE and LE strengths 5/5 and equal bilaterally.   SKIN: No rash  EXT: No LE edema  PSYCH: Alert. Appropriate.    Labs   RF/CCP  Recent Labs   Lab Test  11/10/15   0918  10/26/15   0850   CCPABY  <20  Interpretation:  Negative     --    RHF   --   <20     ILANA/RNP/Sm/SSA/SSB  Recent Labs   Lab Test  10/26/15   0850   JAK  <1.0  Interpretation:  Negative       CBC  Recent Labs   Lab Test  06/21/17   1317  03/21/17   1526  12/21/16   0953   WBC  7.5  7.6  5.2   RBC  4.21  3.70*  4.24   HGB  12.8  11.3*  12.7   HCT  38.7  34.7*  39.5   MCV  92  94  93   RDW  16.0*  15.1*  15.0   PLT  308  302  303   MCH  30.4  30.5  30.0   MCHC  33.1  32.6  32.2   NEUTROPHIL  68.5  64.1  63.4   LYMPH  18.0  20.7  19.0   MONOCYTE  7.3  7.4  9.2   EOSINOPHIL  5.8  7.4  8.0   BASOPHIL  0.4  0.4  0.4   ANEU  5.2  4.8  3.3   ALYM  1.4  1.6  1.0   KOKO  0.6  0.6  0.5   AEOS  0.4  0.6  0.4   ABAS  0.0  0.0  0.0     CMP  Recent Labs   Lab Test  06/21/17   1317  03/21/17   1526  12/21/16   0953  11/23/16   0917   10/26/15   0850  " 10/15/14   0704   NA   --    --    --   141   --   141  137   POTASSIUM   --    --    --   4.3   --   3.7  3.7   CHLORIDE   --    --    --   103   --   105  102   CO2   --    --    --   30   --   25  28   ANIONGAP   --    --    --   8   --   11  7   GLC   --    --    --   97   --   93  241*   BUN   --    --    --   16   --   22  15   CR  1.12*  0.81  0.81  0.81   < >  0.82  0.69   GFRESTIMATED  50*  72  72  72   < >  71  88   GFRESTBLACK  60*  88  88  88   < >  86  >90   GFR Calc     OG   --    --    --   9.5   --   10.0  9.3   BILITOTAL  0.4  0.3  0.4   --    < >  0.3  0.5   ALBUMIN  4.1  3.9  4.1   --    < >  4.1  3.9   PROTTOTAL  7.4  7.4  7.4   --    < >  7.4  7.6   ALKPHOS  70  71  75   --    < >  79  127   AST  29  26  26   --    < >  25  33   ALT  36  30  35   --    < >  30  39    < > = values in this interval not displayed.     HgA1c  Recent Labs   Lab Test  11/23/16   0917  10/26/15   0850  03/11/15   1033   A1C  6.0  6.1*  6.8*     Uric Acid  Recent Labs   Lab Test  10/26/15   0850  07/26/11   1504   URIC  4.5  6.5     Calcium/VitaminD  Recent Labs   Lab Test  11/23/16   0917  11/10/15   0918  10/26/15   0850  10/15/14   0704   02/12/13   0722  12/20/11   0829   12/16/09   0759   OG  9.5   --   10.0  9.3   < >  9.7  10.1   < >  9.9   D3VIT   --    --    --    --    --    --   26   --   29   VITDT   --   50   --    --    --   23*   --    --    --     < > = values in this interval not displayed.     ESR/CRP  Recent Labs   Lab Test  09/07/16   1622  02/17/16   0903  11/10/15   0918   SED  11  14  12   CRP  <2.9  6.0  5.6     TSH/T4  Recent Labs   Lab Test  10/26/15   0850  10/15/14   0704  02/12/13   0722   TSH  1.76  2.35  2.07     Lipid Panel  Recent Labs   Lab Test  11/23/16   0917  12/21/15   0844  12/19/14   0947  10/15/14   0704  02/12/13   0722   CHOL  153  169  201*  200*  207*   TRIG  73  85  66  120  128   HDL  70  71  60  56  46*   LDL  68  81  128  120  135*   VLDL   --    --    13  24  26   CHOLHDLRATIO   --    --   3.4  3.6  4.5   NHDL  83  98   --    --    --      Hepatitis B  Recent Labs   Lab Test  12/21/15   0844   AUSAB  0.12   HBCAB  Nonreactive   HEPBANG  Nonreactive     Hepatitis C  Recent Labs   Lab Test  12/21/15   0844   HCVAB  Nonreactive   Assay performance characteristics have not been established for newborns,   infants, and children       HIV Screening  Recent Labs   Lab Test  12/21/15   0844   HIAGAB  Nonreactive   HIV-1 p24 Ag & HIV-1/HIV-2 Ab Not Detected       Immunization History     Immunization History   Administered Date(s) Administered     Pneumococcal (PCV 13) 06/01/2016     Pneumococcal 23 valent 09/07/2016     TD (ADULT, 7+) 12/06/1995, 12/06/2006     Procedure     Procedure: Trigger finger injection  Indication: Trigger finger / pain    The procedure was explained in detail. Risks including infection, pain, structural damage such as cartilage damage and tendon rupture, and medication reaction were explained. The option of not doing the procedure was also provided. All questions were answered and the patient consented to the procedure.     A time-out was performed and the correct patient, procedure, and laterality were verified.    The left third digit was examined and location for injection was identified to be on the palmar aspect of the left hand just proximal to the third MCP at the location of the A1 pulley. The area was cleaned with chlorhexidine, twice.  Ethyl chloride was then used for topical anaesthetic. Then a mixture of lidocaine 1% 0.05mL and Depo-Medrol 10mg (0.25mL) was injected near the tendon sheath at the A1 pulley.     The patient tolerated the procedure well. No complications.    MEDICATION: Depo Medrol 40mg  LOT #: [id number]  : High[employer]  EXPIRATION DATE: 07/01/2018  NDC#: 8409-3139-01         Chart documentation done in part with Dragon Voice recognition Software. Although reviewed after completion, some word and  grammatical error may remain.    Chris Capellan MD

## 2017-06-30 NOTE — PROGRESS NOTES
"[employer] message sent:  \"Ms. Abarca,    Creatinine is improved on the most recent labs.  Please ensure that you are well hydrated for future labs.    Please let me know if you have any questions.    Sincerely,  Chris Capellan MD  6/30/2017 2:29 PM\""

## 2017-07-05 ENCOUNTER — TELEPHONE (OUTPATIENT)
Dept: FAMILY MEDICINE | Facility: CLINIC | Age: 57
End: 2017-07-05

## 2017-07-05 NOTE — TELEPHONE ENCOUNTER
Panel Management Review      Patient has the following on her problem list:     Asthma review     ACT Total Scores 11/30/2016   ACT TOTAL SCORE (Goal Greater than or Equal to 20) 25   In the past 12 months, how many times did you visit the emergency room for your asthma without being admitted to the hospital? 0   In the past 12 months, how many times were you hospitalized overnight because of your asthma? 0      1. Is Asthma diagnosis on the Problem List? Yes    2. Is Asthma listed on Health Maintenance? Yes    3. Patient is due for:  ACT and AAP    Diabetes    ASA: Passed    Last A1C  Lab Results   Component Value Date    A1C 6.0 11/23/2016    A1C 6.1 10/26/2015    A1C 6.8 03/11/2015    A1C 9.0 12/19/2014    A1C 12.6 10/15/2014     A1C tested: Passed    Last LDL:    Lab Results   Component Value Date    CHOL 153 11/23/2016     Lab Results   Component Value Date    HDL 70 11/23/2016     Lab Results   Component Value Date    LDL 68 11/23/2016     Lab Results   Component Value Date    TRIG 73 11/23/2016     Lab Results   Component Value Date    CHOLHDLRATIO 3.4 12/19/2014     Lab Results   Component Value Date    NHDL 83 11/23/2016       Is the patient on a Statin? YES             Is the patient on Aspirin? YES    Medications     HMG CoA Reductase Inhibitors    simvastatin (ZOCOR) 10 MG tablet    Salicylates    aspirin 81 MG tablet          Last three blood pressure readings:  BP Readings from Last 3 Encounters:   06/28/17 136/68   12/21/16 132/76   11/30/16 150/90       Date of last diabetes office visit: 11/2016     Tobacco History:     History   Smoking Status     Never Smoker   Smokeless Tobacco     Never Used           Composite cancer screening  Chart review shows that this patient is due/due soon for the following Pap Smear and Fecal Colorectal (FIT)  Summary:    Patient is due/failing the following:   A1C, AAP, ACT, FIT and PAP    Action needed:   Patient needs office visit for diabetes and asthma  ang.    Type of outreach:    [employer] message. 07/05/2017    Questions for provider review:    None                                                                                                                                    Lizzy Resendiz [facility]       Chart routed to Care Team .

## 2017-07-09 DIAGNOSIS — I10 ESSENTIAL HYPERTENSION WITH GOAL BLOOD PRESSURE LESS THAN 140/90: ICD-10-CM

## 2017-07-10 RX ORDER — LOSARTAN POTASSIUM 100 MG/1
TABLET ORAL
Qty: 30 TABLET | Refills: 0 | Status: SHIPPED | OUTPATIENT
Start: 2017-07-10 | End: 2017-08-16

## 2017-07-10 RX ORDER — HYDRALAZINE HYDROCHLORIDE 50 MG/1
TABLET, FILM COATED ORAL
Qty: 30 TABLET | Refills: 0 | Status: SHIPPED | OUTPATIENT
Start: 2017-07-10 | End: 2017-08-16

## 2017-07-10 NOTE — TELEPHONE ENCOUNTER
Medication is being filled for 1 time refill only due to:  Patient needs to be seen because she needs a blood pressure follow up visit. (Hydralazine dose was increased at her last visit with PCP).     Rolanda Hernandez RN

## 2017-07-10 NOTE — TELEPHONE ENCOUNTER
hydrALAZINE (APRESOLINE) 50 MG tablet      Last Written Prescription Date: 5-5-17  Last Fill Quantity: 90, # refills: 0    Last Office Visit with [facility] [place] [facility], [facility] or [facility] prescribing provider:  11-30-16   Future Office Visit:        BP Readings from Last 3 Encounters:   06/28/17 136/68   12/21/16 132/76   11/30/16 150/90     losartan (COZAAR) 100 MG tablet      Last Written Prescription Date: 11-30-16  Last Fill Quantity: 90, # refills: 3  Last Office Visit with [facility], [facility] or [facility] prescribing provider: 11-30-16       Potassium   Date Value Ref Range Status   06/28/2017 4.3 3.4 - 5.3 mmol/L Final     Creatinine   Date Value Ref Range Status   06/28/2017 0.99 0.52 - 1.04 mg/dL Final     BP Readings from Last 3 Encounters:   06/28/17 136/68   12/21/16 132/76   11/30/16 150/90

## 2017-07-13 NOTE — TELEPHONE ENCOUNTER
Patient has reviewed my chart message and is aware she needs to make a clinic appointment  Lizzy Resendiz CMA

## 2017-08-16 ENCOUNTER — OFFICE VISIT (OUTPATIENT)
Dept: FAMILY MEDICINE | Facility: CLINIC | Age: 57
End: 2017-08-16
Payer: COMMERCIAL

## 2017-08-16 VITALS
OXYGEN SATURATION: 100 % | WEIGHT: 224 LBS | TEMPERATURE: 97.9 F | RESPIRATION RATE: 14 BRPM | DIASTOLIC BLOOD PRESSURE: 92 MMHG | HEIGHT: 66 IN | BODY MASS INDEX: 36 KG/M2 | SYSTOLIC BLOOD PRESSURE: 136 MMHG | HEART RATE: 96 BPM

## 2017-08-16 DIAGNOSIS — M06.00 SERONEGATIVE RHEUMATOID ARTHRITIS (H): ICD-10-CM

## 2017-08-16 DIAGNOSIS — J45.30 MILD PERSISTENT ASTHMA WITHOUT COMPLICATION: ICD-10-CM

## 2017-08-16 DIAGNOSIS — L29.9 ITCHY SKIN: ICD-10-CM

## 2017-08-16 DIAGNOSIS — F43.21 GRIEF: ICD-10-CM

## 2017-08-16 DIAGNOSIS — E11.9 TYPE 2 DIABETES MELLITUS WITHOUT COMPLICATION, WITHOUT LONG-TERM CURRENT USE OF INSULIN (H): Primary | ICD-10-CM

## 2017-08-16 DIAGNOSIS — E66.01 MORBID OBESITY DUE TO EXCESS CALORIES (H): ICD-10-CM

## 2017-08-16 DIAGNOSIS — E78.5 HYPERLIPIDEMIA LDL GOAL <100: ICD-10-CM

## 2017-08-16 DIAGNOSIS — I10 ESSENTIAL HYPERTENSION WITH GOAL BLOOD PRESSURE LESS THAN 140/90: ICD-10-CM

## 2017-08-16 DIAGNOSIS — Z23 NEED FOR VACCINATION: ICD-10-CM

## 2017-08-16 PROBLEM — M65.332 TRIGGER MIDDLE FINGER OF LEFT HAND: Status: RESOLVED | Noted: 2017-06-28 | Resolved: 2017-08-16

## 2017-08-16 LAB — HBA1C MFR BLD: 5.7 % (ref 4.3–6)

## 2017-08-16 PROCEDURE — 99214 OFFICE O/P EST MOD 30 MIN: CPT | Mod: 25 | Performed by: FAMILY MEDICINE

## 2017-08-16 PROCEDURE — 90715 TDAP VACCINE 7 YRS/> IM: CPT | Performed by: FAMILY MEDICINE

## 2017-08-16 PROCEDURE — 90471 IMMUNIZATION ADMIN: CPT | Performed by: FAMILY MEDICINE

## 2017-08-16 PROCEDURE — 36415 COLL VENOUS BLD VENIPUNCTURE: CPT | Performed by: FAMILY MEDICINE

## 2017-08-16 PROCEDURE — 83036 HEMOGLOBIN GLYCOSYLATED A1C: CPT | Performed by: FAMILY MEDICINE

## 2017-08-16 RX ORDER — HYDRALAZINE HYDROCHLORIDE 50 MG/1
TABLET, FILM COATED ORAL
Qty: 60 TABLET | Refills: 1 | Status: SHIPPED | OUTPATIENT
Start: 2017-08-16 | End: 2017-10-10

## 2017-08-16 RX ORDER — CETIRIZINE HYDROCHLORIDE 10 MG/1
10-20 TABLET ORAL DAILY PRN
Qty: 60 TABLET | Refills: 11 | Status: SHIPPED | OUTPATIENT
Start: 2017-08-16

## 2017-08-16 RX ORDER — SIMVASTATIN 10 MG
10 TABLET ORAL AT BEDTIME
Qty: 90 TABLET | Refills: 3 | Status: SHIPPED | OUTPATIENT
Start: 2017-08-16 | End: 2018-07-16

## 2017-08-16 RX ORDER — HYDROXYZINE HYDROCHLORIDE 25 MG/1
TABLET, FILM COATED ORAL
Qty: 60 TABLET | Refills: 11 | Status: SHIPPED | OUTPATIENT
Start: 2017-08-16 | End: 2018-07-17

## 2017-08-16 RX ORDER — LOSARTAN POTASSIUM 100 MG/1
TABLET ORAL
Qty: 30 TABLET | Refills: 1 | Status: SHIPPED | OUTPATIENT
Start: 2017-08-16 | End: 2017-10-13

## 2017-08-16 ASSESSMENT — PATIENT HEALTH QUESTIONNAIRE - PHQ9
5. POOR APPETITE OR OVEREATING: SEVERAL DAYS
SUM OF ALL RESPONSES TO PHQ QUESTIONS 1-9: 6

## 2017-08-16 ASSESSMENT — ANXIETY QUESTIONNAIRES
3. WORRYING TOO MUCH ABOUT DIFFERENT THINGS: NOT AT ALL
GAD7 TOTAL SCORE: 3
1. FEELING NERVOUS, ANXIOUS, OR ON EDGE: SEVERAL DAYS
IF YOU CHECKED OFF ANY PROBLEMS ON THIS QUESTIONNAIRE, HOW DIFFICULT HAVE THESE PROBLEMS MADE IT FOR YOU TO DO YOUR WORK, TAKE CARE OF THINGS AT HOME, OR GET ALONG WITH OTHER PEOPLE: SOMEWHAT DIFFICULT
7. FEELING AFRAID AS IF SOMETHING AWFUL MIGHT HAPPEN: NOT AT ALL
6. BECOMING EASILY ANNOYED OR IRRITABLE: SEVERAL DAYS
5. BEING SO RESTLESS THAT IT IS HARD TO SIT STILL: NOT AT ALL
2. NOT BEING ABLE TO STOP OR CONTROL WORRYING: NOT AT ALL

## 2017-08-16 ASSESSMENT — PAIN SCALES - GENERAL: PAINLEVEL: NO PAIN (0)

## 2017-08-16 NOTE — NURSING NOTE
Prior to injection verified patient identity using patient's name and date of birth.  Screening Questionnaire for Adult Immunization    Are you sick today?   No   Do you have allergies to medications, food, a vaccine component or latex?   Yes   Have you ever had a serious reaction after receiving a vaccination?   No   Do you have a long-term health problem with heart disease, lung disease, asthma, kidney disease, metabolic disease (e.g. diabetes), anemia, or other blood disorder?   Yes   Do you have cancer, leukemia, HIV/AIDS, or any other immune system problem?   No   In the past 3 months, have you taken medications that affect  your immune system, such as prednisone, other steroids, or anticancer drugs; drugs for the treatment of rheumatoid arthritis, Crohn s disease, or psoriasis; or have you had radiation treatments?   No   Have you had a seizure, or a brain or other nervous system problem?   No   During the past year, have you received a transfusion of blood or blood     products, or been given immune (gamma) globulin or antiviral drug?   No   For women: Are you pregnant or is there a chance you could become        pregnant during the next month?   No   Have you received any vaccinations in the past 4 weeks?   No     Immunization questionnaire was positive for at least one answer.  Notified MD.        Per orders of Dr. Alarcon, injection of Tdap given by Robyn Lance. Patient instructed to remain in clinic for 15 minutes afterwards, and to report any adverse reaction to me immediately.       Screening performed by Robyn Lance on 8/16/2017 at 4:13 PM.

## 2017-08-16 NOTE — MR AVS SNAPSHOT
After Visit Summary   8/16/2017    Jorge Abarca    MRN: [id number]           Patient Information     Date Of Birth          1960        Visit Information        Provider Department      8/16/2017 3:00 PM Paula Alarcon MD [facility]        Today's Diagnoses     Type 2 diabetes mellitus without complication, without long-term current use of insulin (H)    -  1    Morbid obesity due to excess calories (H)        Seronegative rheumatoid arthritis (H)        Grief        Mild persistent asthma without complication        Essential hypertension with goal blood pressure less than 140/90        Hyperlipidemia LDL goal <100        Itchy skin        Need for vaccination          Care Instructions    Did you know you can lower your blood pressure with your daily habits?    *Losing 20 pounds of weight lowers blood pressure 5 to 20 points.  *Eating a low-fat diet rich in fruits, vegetables and low-fat dairy lowers blood pressure 8 to 14 points.  *Eating a low-salt diet lowers blood pressure 2 to 8 points.  *Exercising regularly lowers blood pressure 4 to 9 points.  *Reducing alcohol use lowers blood pressure 2 to 4 points.    [facility]    If you have any questions regarding to your visit please contact your care team:       Team Red:   Clinic Hours Telephone Number   Dr. Paula Gautam, NP   7am-7pm  Monday - Thursday   7am-5pm  Fridays  [phone]  (Appointment scheduling available 24/7)    Questions about your visit?   Team Line  [phone]   Urgent Care - [place] and [place] - 11am-9pm Monday-Friday Saturday-Sunday- 9am-5pm   [place] - 5pm-9pm Monday-Friday Saturday-Sunday- 9am-5pm  [phone] - [place]   [phone] - [place]       What options do I have for visits at the clinic other than the traditional office visit?  To expand how we care for you, many of our providers are utilizing  electronic visits (e-visits) and telephone visits, when medically appropriate, for interactions with their patients rather than a visit in the clinic.   We also offer nurse visits for many medical concerns. Just like any other service, we will bill your insurance company for this type of visit based on time spent on the phone with your provider. Not all insurance companies cover these visits. Please check with your medical insurance if this type of visit is covered. You will be responsible for any charges that are not paid by your insurance.      E-visits via [employer]:  generally incur a $35.00 fee.  Telephone visits:  Time spent on the phone: *charged based on time that is spent on the phone in increments of 10 minutes. Estimated cost:   5-10 mins $30.00   11-20 mins. $59.00   21-30 mins. $85.00     Use Referral.IM (secure email communication and access to your chart) to send your primary care provider a message or make an appointment. Ask someone on your Team how to sign up for [email or web address].  For a Price Quote for your services, please call our [place] Line at [phone].      As always, Thank you for trusting us with your health care needs!  Discharged by Robyn GARCIA CMA ([facility])            Follow-ups after your visit        Follow-up notes from your care team     Return in about 1 month (around 9/16/2017) for physical, BP Recheck.      Your next 10 appointments already scheduled     Sep 27, 2017  3:30 PM CDT   LAB with  LAB   [facility] ([facility]    [address] [address]   [phone]           Patient must bring picture ID. Patient should be prepared to give a urine specimen  Please do not eat 10-12 hours before your appointment if you are coming in fasting for labs on lipids, cholesterol, or glucose (sugar). Pregnant women should follow their Care Team instructions. Water with medications is okay. Do not drink coffee or other fluids. If you have concerns  about taking  your medications, please ask at office or if scheduling via IBillionaire, send a message by clicking on Secure Messaging, Message Your Care Team.            Dec 21, 2017  3:30 PM CST   LAB with [facility]   [place] [id number] [facility]  [place] [address]   [phone]           Patient must bring picture ID. Patient should be prepared to give a urine specimen  Please do not eat 10-12 hours before your appointment if you are coming in fasting for labs on lipids, cholesterol, or glucose (sugar). Pregnant women should follow their Care Team instructions. Water with medications is okay. Do not drink coffee or other fluids. If you have concerns about taking  your medications, please ask at office or if scheduling via IBillionaire, send a message by clicking on Secure Messaging, Message Your Care Team.            Jan 03, 2018  3:20 PM CST   Return Visit with Chris Capellan MD   [facility] ([address]  [place] [id number] [phone]              Who to contact     If you have questions or need follow up information about today's clinic visit or your schedule please contact [facility] directly at [phone].  Normal or non-critical lab and imaging results will be communicated to you by [employer]art, letter or phone within 4 business days after the clinic has received the results. If you do not hear from us within 7 days, please contact the clinic through MyChart or phone. If you have a critical or abnormal lab result, we will notify you by phone as soon as possible.  Submit refill requests through IBillionaire or call your pharmacy and they will forward the refill request to us. Please allow 3 business days for your refill to be completed.          Additional Information About Your Visit        IBillionaire Information     IBillionaire gives you secure access to your electronic health record. If you see a primary  "care provider, you can also send messages to your care team and make appointments. If you have questions, please call your primary care clinic.  If you do not have a primary care provider, please call [phone] and they will assist you.        Care EveryWhere ID     This is your Care EveryWhere ID. This could be used by other organizations to access your [place] medical records  [id number]        Your Vitals Were     Pulse Temperature Respirations Height Last Period Pulse Oximetry    96 97.9  F (36.6  C) (Oral) 14 5' 6\" (1.676 m) 03/04/2011 100%    Breastfeeding? BMI (Body Mass Index)                No 36.15 kg/m2           Blood Pressure from Last 3 Encounters:   08/16/17 (!) 148/98   06/28/17 136/68   12/21/16 132/76    Weight from Last 3 Encounters:   08/16/17 224 lb (101.6 kg)   06/28/17 215 lb 3.2 oz (97.6 kg)   12/21/16 208 lb 12.8 oz (94.7 kg)              We Performed the Following     HEMOGLOBIN A1C     TDAP VACCINE (ADACEL)          Today's Medication Changes          These changes are accurate as of: 8/16/17  4:07 PM.  If you have any questions, ask your nurse or doctor.               These medicines have changed or have updated prescriptions.        Dose/Directions    cetirizine 10 MG tablet   Commonly known as:  ZYRTEC ALLERGY   This may have changed:    - how much to take  - how to take this  - when to take this  - reasons to take this  - additional instructions   Used for:  Itchy skin   Changed by:  Paula Alarcon MD        Dose:  10-20 mg   Take 1-2 tablets (10-20 mg) by mouth daily as needed for allergies   Quantity:  60 tablet   Refills:  11       hydrALAZINE 50 MG tablet   Commonly known as:  APRESOLINE   This may have changed:  See the new instructions.   Used for:  Essential hypertension with goal blood pressure less than 140/90   Changed by:  Paula Alarcon MD        TAKE 1 TABLET (50 MG) BY MOUTH TWICE DAILY   Quantity:  60 tablet   Refills:  1       hydrOXYzine 25 MG tablet "   Commonly known as:  ATARAX   This may have changed:  See the new instructions.   Used for:  Itchy skin   Changed by:  Paula Alarcon MD        TAKE 1 TO 2 TABLETS BY MOUTH EVERY NIGHT AT BEDTIME AS NEEDED FOR ITCH   Quantity:  60 tablet   Refills:  11       losartan 100 MG tablet   Commonly known as:  COZAAR   This may have changed:  See the new instructions.   Used for:  Essential hypertension with goal blood pressure less than 140/90   Changed by:  Paula Alarcon MD        TAKE 1 TABLET (100 MG) BY MOUTH DAILY   Quantity:  30 tablet   Refills:  1         Stop taking these medicines if you haven't already. Please contact your care team if you have questions.     LANsoprazole 15 MG CR capsule   Commonly known as:  PREVACID   Stopped by:  Paula Alarcon MD           triamcinolone 0.1 % ointment   Commonly known as:  KENALOG   Stopped by:  Paula Alarcon MD           ZADITOR 0.025 % Soln ophthalmic solution   Generic drug:  ketotifen   Stopped by:  Paula Alarcon MD                Where to get your medicines      These medications were sent to [facility] [address] [place] [address] [address] [facility] [address]  [address], [facility] [address]    Hours:  Test fax successful 12/11/02  KR Phone:  [phone]     cetirizine 10 MG tablet    hydrALAZINE 50 MG tablet    hydrOXYzine 25 MG tablet    losartan 100 MG tablet    metFORMIN 1000 MG tablet    simvastatin 10 MG tablet                Primary Care Provider Office Phone # Fax #    Coy Varela [phone]       [address] [facility] [address]        Equal Access to Services     [facility] AH: Hadii isra ku hadasho Soomaali, waaxda luqadaha, qaybta kaalmada adeegyada, kodi idiin hayaan adeeg kharash la'aan . So [facility] [phone].    ATENCIÓN: Si habla español, tiene a murphy disposición servicios gratuitos de asistencia lingüística. Llame al [phone].    We comply with applicable  federal civil rights laws and [place] laws. We do not discriminate on the basis of race, color, national origin, age, disability sex, sexual orientation or gender identity.            Thank you!     Thank you for choosing [facility]  for your care. Our goal is always to provide you with excellent care. Hearing back from our patients is one way we can continue to improve our services. Please take a few minutes to complete the written survey that you may receive in the mail after your visit with us. Thank you!             Your Updated Medication List - Protect others around you: Learn how to safely use, store and throw away your medicines at www.disposemymeds.org.          This list is accurate as of: 8/16/17  4:08 PM.  Always use your most recent med list.                   Brand Name Dispense Instructions for use Diagnosis    aspirin 81 MG tablet     30 tablet    Take 1 tablet (81 mg) by mouth daily    Type 2 diabetes mellitus without complication, without long-term current use of insulin (H)       blood glucose monitoring test strip    no brand specified    3 Month    Use to test blood sugar 1 times daily or as directed. One touch ultra test strips    DM type 2, goal A1c below 7       CALCIUM 1200 PO      Take  by mouth.        cetirizine 10 MG tablet    ZYRTEC ALLERGY    60 tablet    Take 1-2 tablets (10-20 mg) by mouth daily as needed for allergies    Itchy skin       CRANBERRY           DAILY MULTIVITAMIN PO      Take  by mouth.        FISH OIL           fluticasone 220 MCG/ACT Inhaler    FLOVENT HFA    1 Inhaler    Inhale 2 puffs into the lungs daily    Mild persistent asthma, uncomplicated       folic acid 1 MG tablet    FOLVITE    100 tablet    Take 1 tablet (1 mg) by mouth daily    Seronegative rheumatoid arthritis (H)       hydrALAZINE 50 MG tablet    APRESOLINE    60 tablet    TAKE 1 TABLET (50 MG) BY MOUTH TWICE DAILY    Essential hypertension with goal blood pressure less than 140/90        hydroxychloroquine 200 MG tablet    PLAQUENIL    180 tablet    Take 2 tablets (400 mg) by mouth daily    Seronegative rheumatoid arthritis (H)       hydrOXYzine 25 MG tablet    ATARAX    60 tablet    TAKE 1 TO 2 TABLETS BY MOUTH EVERY NIGHT AT BEDTIME AS NEEDED FOR ITCH    Itchy skin       ibuprofen 200 MG capsule      Take 200 mg by mouth every 4 hours as needed.        levalbuterol 45 MCG/ACT Inhaler    XOPENEX HFA    1 Inhaler    Inhale 2 puffs into the lungs every 4 hours as needed    Mild persistent asthma, uncomplicated       losartan 100 MG tablet    COZAAR    30 tablet    TAKE 1 TABLET (100 MG) BY MOUTH DAILY    Essential hypertension with goal blood pressure less than 140/90       metFORMIN 1000 MG tablet    GLUCOPHAGE    180 tablet    Take 1 tablet (1,000 mg) by mouth 2 times daily (with meals)    Type 2 diabetes mellitus without complication, without long-term current use of insulin (H)       methotrexate sodium 2.5 MG Tabs     96 tablet    Take 20 mg by mouth once a week . Take all 8 tablets on the same day of each week.    Seronegative rheumatoid arthritis (H)       simvastatin 10 MG tablet    ZOCOR    90 tablet    Take 1 tablet (10 mg) by mouth At Bedtime    Hyperlipidemia LDL goal <100       tacrolimus 0.1 % ointment    PROTOPIC    60 g    Apply to the face daily, already failed desonide    AD (atopic dermatitis)       triamcinolone 55 MCG/ACT Inhaler    NASACORT     Spray 2 sprays into both nostrils daily    Seasonal allergic rhinitis       VITAMIN C PO      Take  by mouth.        VITAMIN D PO      Take  by mouth.

## 2017-08-16 NOTE — PATIENT INSTRUCTIONS
Did you know you can lower your blood pressure with your daily habits?    *Losing 20 pounds of weight lowers blood pressure 5 to 20 points.  *Eating a low-fat diet rich in fruits, vegetables and low-fat dairy lowers blood pressure 8 to 14 points.  *Eating a low-salt diet lowers blood pressure 2 to 8 points.  *Exercising regularly lowers blood pressure 4 to 9 points.  *Reducing alcohol use lowers blood pressure 2 to 4 points.    [facility]    If you have any questions regarding to your visit please contact your care team:       Team Red:   Clinic Hours Telephone Number   Dr. Paula Gautam, NP   7am-7pm  Monday - Thursday   7am-5pm  Fridays  [phone]  (Appointment scheduling available 24/7)    Questions about your visit?   Team Line  [phone]   [facility] [place] [facility] [facility] 11am-9pm Monday-Friday Saturday-Sunday- 9am-5pm   [place] - 5pm-9pm Monday-Friday Saturday-Sunday- 9am-5pm  [phone] - [facility]  [phone] - [place]       What options do I have for visits at the clinic other than the traditional office visit?  To expand how we care for you, many of our providers are utilizing electronic visits (e-visits) and telephone visits, when medically appropriate, for interactions with their patients rather than a visit in the clinic.   We also offer nurse visits for many medical concerns. Just like any other service, we will bill your insurance company for this type of visit based on time spent on the phone with your provider. Not all insurance companies cover these visits. Please check with your medical insurance if this type of visit is covered. You will be responsible for any charges that are not paid by your insurance.      E-visits via Tradoria:  generally incur a $35.00 fee.  Telephone visits:  Time spent on the phone: *charged based on time that is spent on the phone in increments of 10 minutes. Estimated  cost:   5-10 mins $30.00   11-20 mins. $59.00   21-30 mins. $85.00     Use [employer]hart (secure email communication and access to your chart) to send your primary care provider a message or make an appointment. Ask someone on your Team how to sign up for [employer].  For a Price Quote for your services, please call our [employer] Line at [phone].      As always, Thank you for trusting us with your health care needs!  Discharged by Robyn GARCIA CMA ([facility])

## 2017-08-16 NOTE — PROGRESS NOTES
SUBJECTIVE:                                                    Jorge Abarca is a 56 year old obese female who presents to clinic today for the following health issues:    Diabetes Follow-up      Patient is checking blood sugars: rarely.  Results range from 150's to 180's    Diabetic concerns: None     Symptoms of hypoglycemia (low blood sugar): none     Paresthesias (numbness or burning in feet) or sores: No     Date of last diabetic eye exam: within the year    Hyperlipidemia Follow-Up      Rate your low fat/cholesterol diet?: fair    Taking statin?  No    Other lipid medications/supplements?:  none    Hypertension Follow-up      Outpatient blood pressures are not being checked.    Low Salt Diet: low salt    Asthma Follow-Up    Was ACT completed today?    Yes    ACT Total Scores 11/30/2016   ACT TOTAL SCORE -   ASTHMA ER VISITS -   ASTHMA HOSPITALIZATIONS -   ACT TOTAL SCORE (Goal Greater than or Equal to 20) 25   In the past 12 months, how many times did you visit the emergency room for your asthma without being admitted to the hospital? 0   In the past 12 months, how many times were you hospitalized overnight because of your asthma? 0       Recent asthma triggers that patient is dealing with: None        Amount of exercise or physical activity: None    Problems taking medications regularly: No    Medication side effects: none    Diet: low salt and diabetic    She is grieving the death of her  to cancer 2 months ago.     I have reviewed the patient's medical history in detail and updated the computerized patient record.     ROS:  CONSTITUTIONAL:POSITIVE  for weight gain and fatigue   I: NEGATIVE for worrisome rashes, moles or lesions  E: NEGATIVE for vision changes or irritation  ENT/MOUTH: allergies   RESP: asthma   CV: NEGATIVE for chest pain, palpitations or peripheral edema  GI: NEGATIVE for nausea, abdominal pain, heartburn, or change in bowel habits  : NEGATIVE for frequency, dysuria, or  "hematuria  MUSCULOSKELETAL: RA   N: NEGATIVE for weakness, dizziness or paresthesias  ENDOCRINE: Hx diabetes  H: NEGATIVE for bleeding problems  PSYCHIATRIC: grief     OBJECTIVE:     BP (!) 148/98  Pulse 96  Temp 97.9  F (36.6  C) (Oral)  Resp 14  Ht 5' 6\" (1.676 m)  Wt 224 lb (101.6 kg)  LMP 03/04/2011  SpO2 100%  Breastfeeding? No  BMI 36.15 kg/m2  Body mass index is 36.15 kg/(m^2).  GENERAL: alert, no distress and obese  NECK: no adenopathy, no asymmetry, masses, or scars and thyroid normal to palpation  RESP: lungs clear to auscultation - no rales, rhonchi or wheezes  CV: regular rate and rhythm, normal S1 S2, no S3 or S4, no murmur, click or rub, no peripheral edema and peripheral pulses strong  MS: no gross musculoskeletal defects noted, no edema  PSYCH: mentation appears normal, affect normal/bright    Diagnostic Test Results:  Results for orders placed or performed in visit on 08/16/17   HEMOGLOBIN A1C   Result Value Ref Range    Hemoglobin A1C 5.7 4.3 - 6.0 %       ASSESSMENT/PLAN:   (E11.9) Type 2 diabetes mellitus without complication, without long-term current use of insulin (H)  (primary encounter diagnosis)  (E66.01) Morbid obesity due to excess calories (H)  Comment: Well controlled with medications without side effects.     (M06.00) Seronegative rheumatoid arthritis (H)  Comment: Well controlled with medications without side effects.   Plan: follow-up with rheumatology as planned     (F43.20) Grief  Plan: My sympathies expressed.  Call or return to clinic as needed if these symptoms worsen or fail to improve as anticipated.      (J45.30) Mild persistent asthma without complication  Comment: Well controlled with medications without side effects. She sees an outside allergist   Plan: ACT q 6 months      (I10) Essential hypertension with goal blood pressure less than 140/90  Comment: uncontrolled   Plan: hydrALAZINE (APRESOLINE) 50 MG tablet, losartan        (COZAAR) 100 MG tablet        " Recommended addition of metoprolol XR 25 mg daily which she will consider. Counseled to make better food choices, exercise as tolerated, and lose weight. Follow up for AFE in one month.    (E78.5) Hyperlipidemia LDL goal <100  Comment: Well controlled with medications without side effects.   Plan: simvastatin (ZOCOR) 10 MG tablet         (L29.9) Itchy skin  Plan: hydrOXYzine (ATARAX) 25 MG tablet, cetirizine         (ZYRTEC ALLERGY) 10 MG tablet             See Patient Instructions    Paula Alarcon MD  [facility]

## 2017-08-16 NOTE — NURSING NOTE
"Chief Complaint   Patient presents with     Other     Establish Care     Medication Refill     Need for ACT       Initial BP (!) 148/98  Pulse 96  Temp 97.9  F (36.6  C) (Oral)  Resp 14  Ht 5' 6\" (1.676 m)  Wt 224 lb (101.6 kg)  LMP 03/04/2011  SpO2 100%  Breastfeeding? No  BMI 36.15 kg/m2 Estimated body mass index is 36.15 kg/(m^2) as calculated from the following:    Height as of this encounter: 5' 6\" (1.676 m).    Weight as of this encounter: 224 lb (101.6 kg).  Medication Reconciliation: complete   Melisa Paris CMA (AAMA)      "

## 2017-08-17 ASSESSMENT — ANXIETY QUESTIONNAIRES: GAD7 TOTAL SCORE: 3

## 2017-08-28 ENCOUNTER — TELEPHONE (OUTPATIENT)
Dept: FAMILY MEDICINE | Facility: CLINIC | Age: 57
End: 2017-08-28

## 2017-08-28 NOTE — TELEPHONE ENCOUNTER
Patient was in to see Dr. Alarcon on 08-16-17 and has the diagnoses of Asthma. Patient is due for an ACT and AAP. Please complete. Thank you.

## 2017-08-28 NOTE — LETTER
[facility]  [address] [facility]  [place] [address]  [phone]    September 12, 2017      Jorge Abarca  [address] [place], [place] [address] [address]      Dear Jorge,     Your clinic record indicates that you are due for an asthma update. We have a survey tool called an ACT (or Asthma Control Test) we use to measure the level of control of your asthma. Please complete the enclosed questionnaire and mail it back to us in the self-addressed stamped envelope.     If you have questions about this letter please contact your provider.     Sincerely,    Your [facility]

## 2017-09-01 ENCOUNTER — OFFICE VISIT (OUTPATIENT)
Dept: ALLERGY | Facility: CLINIC | Age: 57
End: 2017-09-01
Payer: COMMERCIAL

## 2017-09-01 VITALS
DIASTOLIC BLOOD PRESSURE: 74 MMHG | HEART RATE: 107 BPM | SYSTOLIC BLOOD PRESSURE: 152 MMHG | OXYGEN SATURATION: 99 % | BODY MASS INDEX: 36.38 KG/M2 | WEIGHT: 225.4 LBS

## 2017-09-01 DIAGNOSIS — J30.81 ALLERGIC RHINITIS DUE TO ANIMAL DANDER: ICD-10-CM

## 2017-09-01 DIAGNOSIS — J45.30 MILD PERSISTENT ASTHMA WITHOUT COMPLICATION: Primary | ICD-10-CM

## 2017-09-01 DIAGNOSIS — J30.1 CHRONIC SEASONAL ALLERGIC RHINITIS DUE TO POLLEN: ICD-10-CM

## 2017-09-01 LAB
FEF 25/75: NORMAL
FEV-1: NORMAL
FEV1/FVC: NORMAL
FVC: NORMAL

## 2017-09-01 PROCEDURE — 94010 BREATHING CAPACITY TEST: CPT | Performed by: ALLERGY & IMMUNOLOGY

## 2017-09-01 PROCEDURE — 99214 OFFICE O/P EST MOD 30 MIN: CPT | Mod: 25 | Performed by: ALLERGY & IMMUNOLOGY

## 2017-09-01 RX ORDER — FLUTICASONE PROPIONATE 110 UG/1
1 AEROSOL, METERED RESPIRATORY (INHALATION) DAILY
Qty: 1 INHALER | Refills: 3 | Status: SHIPPED | OUTPATIENT
Start: 2017-09-01 | End: 2018-01-05

## 2017-09-01 NOTE — PROGRESS NOTES
Jorge Abarca is a 57 year old White female with previous medical history significant for asthma and allergic rhinitis who returns for a follow up visit. Jorge Abarca is being seen today for asthma and seasonal allergies.     The patient returns for follow-up visit. She was seen in August 2016. At that time her Flovent was decreased to 220  g 1 puff inhaled daily. Since last visit she denies any coughing, wheezing, tightness in chest or shortness of breath. She has not used her albuterol inhaler. No interval ER visits or hospitalizations. No interval prednisone. She regards her asthma as very well-controlled. No flaring with exercise. Asthma does flare with dogs, dust, smoke and perfumes.    She was on allergen immunotherapy. She was started on immunotherapy 13 years ago. In August 2016 her allergen immunotherapy was discontinued. She has had spring, summer and fall ocular itching, ocular watering and sneezing. She is taking Zyrtec daily, refresh eyedrops daily and Nasacort as needed. Symptoms are unchanged since stopping her allergen immunotherapy. Ocular antihistamines have caused dry eyes in the past. She is currently comfortable with allergy control.    ACT Total Scores 9/1/2017   ACT TOTAL SCORE -   ASTHMA ER VISITS -   ASTHMA HOSPITALIZATIONS -   ACT TOTAL SCORE (Goal Greater than or Equal to 20) 25   In the past 12 months, how many times did you visit the emergency room for your asthma without being admitted to the hospital? 0   In the past 12 months, how many times were you hospitalized overnight because of your asthma? 0           Past Medical History:   Diagnosis Date     Allergic rhinitis due to animal dander      Allergy to mold spores     7/03 skin tests per [facility]: pos. for cat/dog/CR/DM/M/T/G/W     Diabetes (H)      Diagnostic skin and sensitization tests 7/03 skin tests per [facility]: pos. for cat/dog/CR/DM/M/T/G/W     DVT (deep venous thrombosis) (H)      Eczema     sees [facility]     GERD  (gastroesophageal reflux disease)      High cholesterol     occ.     House dust mite allergy      HTN (hypertension)      Indigestion     uses OTC Zantac prn     Mild persistent asthma      Morbid obesity due to excess calories (H)      Need for desensitization to allergens 9/03 started at [facility]. for: cat/dog/DM/M/T/G/W    start IT at [facility]: about 1/12      PONV (postoperative nausea and vomiting)      RA (rheumatoid arthritis) (H)      Seasonal allergic rhinitis      Family History   Problem Relation Age of Onset     Cardiovascular Sister      atrial fibrillation     CEREBROVASCULAR DISEASE Brother      carotid stenosis     CANCER Father      lung     DIABETES Mother      Breast Cancer Maternal Grandmother      also cousin on this side     Breast Cancer Paternal Aunt      C.A.D. No family hx of      Autoimmune Disease No family hx of      Past Surgical History:   Procedure Laterality Date     BACK SURGERY  2009     [facility]  1988    left foot     DISCECTOMY LUMBAR POSTERIOR MICROSCOPIC ONE LEVEL  3/18/2013    Procedure: DISCECTOMY LUMBAR POSTERIOR MICROSCOPIC ONE LEVEL;  Left Lumbar 4-5 Micro Discectomy;  Surgeon: Dallas Jensen MD;  Location: [facility]     TONSILLECTOMY & ADENOIDECTOMY  age 3       REVIEW OF SYSTEMS:  General: negative for weight gain. negative for weight loss. negative for changes in sleep.   Ears: negative for fullness. negative for hearing loss. negative for dizziness.   Nose: negative for snoring.negative for changes in smell. negative for drainage.   Throat: negative for hoarseness. negative for sore throat. negative for trouble swallowing.   Lungs: negative for shortness of breath.negative for wheezing. negative for sputum production.   Cardiovascular: negative for chest pain. negative for swelling of ankles. negative for fast or irregular heartbeat.   Gastrointestinal: negative for nausea. negative for heartburn. negative for acid reflux.   Musculoskeletal:  negative for joint pain. negative for joint stiffness. negative for joint swelling.   Neurologic: negative for seizures. negative for fainting. negative for weakness.   Psychiatric: negative for changes in mood. negative for anxiety.   Endocrine: negative for cold intolerance. negative for heat intolerance. negative for tremors.   Hematologic: negative for easy bruising. negative for easy bleeding.  Integumentary: negative for rash. negative for scaling. negative for nail changes.       Current Outpatient Prescriptions:      fluticasone (FLOVENT HFA) 110 MCG/ACT Inhaler, Inhale 1 puff into the lungs daily, Disp: 1 Inhaler, Rfl: 3     hydrALAZINE (APRESOLINE) 50 MG tablet, TAKE 1 TABLET (50 MG) BY MOUTH TWICE DAILY, Disp: 60 tablet, Rfl: 1     losartan (COZAAR) 100 MG tablet, TAKE 1 TABLET (100 MG) BY MOUTH DAILY, Disp: 30 tablet, Rfl: 1     hydrOXYzine (ATARAX) 25 MG tablet, TAKE 1 TO 2 TABLETS BY MOUTH EVERY NIGHT AT BEDTIME AS NEEDED FOR ITCH, Disp: 60 tablet, Rfl: 11     cetirizine (ZYRTEC ALLERGY) 10 MG tablet, Take 1-2 tablets (10-20 mg) by mouth daily as needed for allergies, Disp: 60 tablet, Rfl: 11     metFORMIN (GLUCOPHAGE) 1000 MG tablet, Take 1 tablet (1,000 mg) by mouth 2 times daily (with meals), Disp: 180 tablet, Rfl: 1     simvastatin (ZOCOR) 10 MG tablet, Take 1 tablet (10 mg) by mouth At Bedtime, Disp: 90 tablet, Rfl: 3     hydroxychloroquine (PLAQUENIL) 200 MG tablet, Take 2 tablets (400 mg) by mouth daily, Disp: 180 tablet, Rfl: 3     folic acid (FOLVITE) 1 MG tablet, Take 1 tablet (1 mg) by mouth daily, Disp: 100 tablet, Rfl: 3     methotrexate sodium 2.5 MG TABS, Take 20 mg by mouth once a week . Take all 8 tablets on the same day of each week., Disp: 96 tablet, Rfl: 1     aspirin 81 MG tablet, Take 1 tablet (81 mg) by mouth daily, Disp: 30 tablet, Rfl:      blood glucose monitoring (NO BRAND SPECIFIED) test strip, Use to test blood sugar 1 times daily or as directed. One touch ultra test  strips, Disp: 3 Month, Rfl: 3     tacrolimus (PROTOPIC) 0.1 % ointment, Apply to the face daily, already failed desonide, Disp: 60 g, Rfl: 0     triamcinolone (NASACORT) 55 MCG/ACT nasal inhaler, Spray 2 sprays into both nostrils daily, Disp: , Rfl:      levalbuterol (XOPENEX HFA) 45 MCG/ACT inhaler, Inhale 2 puffs into the lungs every 4 hours as needed, Disp: 1 Inhaler, Rfl: 3     CRANBERRY, , Disp: , Rfl:      FISH OIL, , Disp: , Rfl:      Cholecalciferol (VITAMIN D PO), Take  by mouth., Disp: , Rfl:      Ascorbic Acid (VITAMIN C PO), Take  by mouth., Disp: , Rfl:      Multiple Vitamin (DAILY MULTIVITAMIN PO), Take  by mouth., Disp: , Rfl:      Calcium Carbonate-Vit D-Min (CALCIUM 1200 PO), Take  by mouth., Disp: , Rfl:      ibuprofen 200 MG capsule, Take 200 mg by mouth every 4 hours as needed., Disp: , Rfl:      [DISCONTINUED] fluticasone (FLOVENT HFA) 220 MCG/ACT inhaler, Inhale 2 puffs into the lungs daily, Disp: 1 Inhaler, Rfl: 11     [DISCONTINUED] amLODIPine (NORVASC) 5 MG tablet, Take 1 tablet (5 mg) by mouth daily, Disp: 90 tablet, Rfl: 1  Immunization History   Administered Date(s) Administered     Pneumococcal (PCV 13) 06/01/2016     Pneumococcal 23 valent 09/07/2016     TD (ADULT, 7+) 12/06/1995, 12/06/2006     TDAP Vaccine (Adacel) 08/16/2017     Allergies   Allergen Reactions     Aleve [Naproxen Sodium] Hives     Nexium [Esomeprazole Magnesium Trihydrate] Hives     HIVES     Shellfish Allergy Nausea     Sulfa Drugs      Stomach upset     Amlodipine Besylate Rash     Hctz Rash     Lisinopril Rash         EXAM:   Constitutional:  Appears well-developed and well-nourished. No distress.   HEENT:   Head: Normocephalic.   Right Ear: External ear normal. TM normal  Left Ear: External ear normal. TM normal  Mouth/Throat: No oropharyngeal exudate present.   Cobblestoning of posterior oropharynx.   Boggy nasal tissue and pale.    Eyes: Conjunctivae are non-erythematous   No maxillary or frontal sinus  tenderness to palpation.   Cardiovascular: Normal rate, regular rhythm and normal heart sounds. Exam reveals no gallop and no friction rub.   No murmur heard.  Respiratory: Effort normal and breath sounds normal. No respiratory distress. No wheezes. No rales.   Musculoskeletal: Normal range of motion.   Lymphadenopathy:   No cervical adenopathy.   No lower extremity edema.   Neuro: Oriented to person, place, and time.  Skin: Skin is warm and dry. No rash noted.   Psychiatric: Normal mood and affect.     Nursing note and vitals reviewed.      WORKUP:   Spirometry  FVC % pred:70  FEV1 % pred:74  FEV1/FVC % act:82  FEF 25-75% pred:90    Decreased FVC and FEV indicative of restriction. Depressed FEV1.       ASSESSMENT/PLAN:  Problem List Items Addressed This Visit        Respiratory    Allergic rhinitis due to animal dander     History of sensitization to cat and dog. As previously mentioned she was on immunotherapy for cat and dog. No symptoms around cats. Can have some mild symptoms around some dogs. She currently takes cetirizine 10 mg by mouth daily. She will infrequently take Nasacort as needed. Allergy symptoms have improved significantly since being on immunotherapy.     - Nasacort 2 sprays/nostril daily if symptoms are not controlled with oral antihistamine.  - Zyrtec daily as needed for allergy symptoms.            Relevant Medications    fluticasone (FLOVENT HFA) 110 MCG/ACT Inhaler    Mild persistent asthma without complication - Primary     Asthma is currently well controlled. No recent use of short-acting beta agonist. She remains on Flovent 220  g  1 puff inhaled daily. She is using this with the spacer. No asthma symptoms with exertion. Asthma triggers remain perfumes, dogs, dust and smoke.      Spirometry today with decreased FEV1. FVC is also decreased. However, similar to previous values.  ACT 25     - An asthma action plan was provided and discussed with patient and family.   - Xopenex 2-4 puffs  inhaled (use a spacer unless using a Proair Respiclick device) every 4 hours as needed for chest tightness, wheezing, shortness of breath and/or coughing.   - Avoid asthma triggers.  - Decrease Flovent to 110  g 1 puff inhaled daily with spacer. Instructed her that if she is doing well in 3 months we will stop Flovent.   - Return to clinic in 6 months.         Relevant Medications    fluticasone (FLOVENT HFA) 110 MCG/ACT Inhaler    Other Relevant Orders    Spirometry, Breathing Capacity (Completed)    Allergic rhinitis due to pollen     Was on allergen immunotherapy. Stopped at last visit. No worsening since stopping immunotherapy. Still has some mild symptoms spring, summer and fall. Current treatment includes cetirizine 10 mg by mouth daily and nasacort as needed.      - Nasacort  2 sprays/nostril daily if symptoms are not controlled with oral antihistamine.   - Zyrtec daily as needed for allergy symptoms.            Relevant Medications    fluticasone (FLOVENT HFA) 110 MCG/ACT Inhaler        Return to clinic in 6 months.     Chart documentation with Dragon Voice recognition Software. Although reviewed after completion, some words and grammatical errors may remain.    Mike Fox,    Allergy/Immunology  [facility], [place] and [place]

## 2017-09-01 NOTE — ASSESSMENT & PLAN NOTE
Asthma is currently well controlled. No recent use of short-acting beta agonist. She remains on Flovent 220  g 1 puff inhaled daily. She is using this with the spacer. No asthma symptoms with exertion. Asthma triggers remain perfumes, dogs, dust and smoke.      Spirometry today with decreased FEV1. FVC is also decreased. However, similar to previous values.  ACT 25     - An asthma action plan was provided and discussed with patient and family.   - Xopenex 2-4 puffs inhaled (use a spacer unless using a Proair Respiclick device) every 4 hours as needed for chest tightness, wheezing, shortness of breath and/or coughing.   - Avoid asthma triggers.  - Decrease Flovent to 110  g 1 puff inhaled daily with spacer. Instructed her that if she is doing well in 3 months we will stop Flovent.   - Return to clinic in 6 months.

## 2017-09-01 NOTE — ASSESSMENT & PLAN NOTE
History of sensitization to cat and dog. As previously mentioned she was on immunotherapy for cat and dog. No symptoms around cats. Can have some mild symptoms around some dogs. She currently takes cetirizine 10 mg by mouth daily. She will infrequently take Nasacort as needed. Allergy symptoms have improved significantly since being on immunotherapy.     - Nasacort 2 sprays/nostril daily if symptoms are not controlled with oral antihistamine.  - Zyrtec daily as needed for allergy symptoms.

## 2017-09-01 NOTE — PATIENT INSTRUCTIONS
Allergy Staff Appt Hours Shot Hours Locations    Physician     Mike Fox DO       Support Staff     Iram HENDERSON RN      Dipti MACK MA  Monday:                      [place] 8-7     Tuesday:         [place] 8-5 Wednesday:        [place]: 7-5     Friday:        [place] 7-5   [place]        Monday: 9-6        Friday: 7-2     [place]        Tuesday: 7-12 Thursday: 1-6 [place] Tuesday: 1-6 Wednesday: 11-6 Thursday: 7-12 [facility]  [address] [place] [address]  Appt Line: [phone]  Allergy RN (Monday):  [phone]    [facility]  [address] [place] [address]  Appt Line: [phone]  Allergy RN (Tues & Wed):  [phone]    [facility]  [address] [place] [address]  Appt Line: [phone]  Allergy RN (Friday):  [phone]       Important Scheduling Information  Aspirin Desensitization: Appt will last 2 clinic days. Please call the Allergy RN line for your clinic to schedule. Discontinue antihistamines 7 days prior to the appointment.     Food Challenges: Appt will last 3-4 hours. Please call the Allergy RN line for your clinic to schedule. Discontinue antihistamines 7 days prior to the appointment.     Penicillin Testing: Appt will last 2-3 hours. Please call the Allergy RN line for your clinic to schedule. Discontinue antihistamines 7 days prior to the appointment.     Skin Testing: Appt will about 40 minutes. Call the appointment line for your clinic to schedule. Discontinue antihistamines 7 days prior to the appointment.     Venom Testing: Appt will last 2-3 hours. Please call the Allergy RN line for your clinic to schedule. Discontinue antihistamines 7 days prior to the appointment.     Thank you for trusting us with your Allergy, Asthma, and Immunology care. Please feel free to contact us with any questions or concerns you may have.      - Continue daily Zyrtec.   - Nasacort 2 sprays/nostril daily.   -  Refresh eye drops as needed.   - Decrease Flovent to 110mcg 1 puff daily. You can stop after 3 months if well controlled.   - Return to clinic in 6 months.

## 2017-09-01 NOTE — MR AVS SNAPSHOT
After Visit Summary   9/1/2017    Jorge Abarca    MRN: [id number]           Patient Information     Date Of Birth          1960        Visit Information        Provider Department      9/1/2017 10:40 AM Mike Fox DO [facility]        Today's Diagnoses     Mild persistent asthma without complication    -  1      Care Instructions    Allergy Staff Appt Hours Shot Hours Locations    Physician     Mike Fox DO       Support Staff     ELBA Mitchell MA  Monday:                      [place] 8-7     Tuesday:         [place] 8-5 Wednesday:        [place]: 7-5 Friday:        [place] 7-5 [place] Monday: 9-6 Friday: 7-2     [place]        Tuesday: 7-12 Thursday: 1-6 [place] Tuesday: 1-6 Wednesday: 11-6 Thursday: 7-12 [facility]  [address] [place] [address]  Appt Line: [phone]  Allergy RN (Monday):  [phone]    [facility]  [address] [place] [address]  Appt Line: [phone]  Allergy RN (Tues & Wed):  [phone]    [facility]  [address] [place] [address]  Appt Line: [phone]  Allergy RN (Friday):  [phone]       Important Scheduling Information  Aspirin Desensitization: Appt will last 2 clinic days. Please call the Allergy RN line for your clinic to schedule. Discontinue antihistamines 7 days prior to the appointment.     Food Challenges: Appt will last 3-4 hours. Please call the Allergy RN line for your clinic to schedule. Discontinue antihistamines 7 days prior to the appointment.     Penicillin Testing: Appt will last 2-3 hours. Please call the Allergy RN line for your clinic to schedule. Discontinue antihistamines 7 days prior to the appointment.     Skin Testing: Appt will about 40 minutes. Call the appointment line for your clinic to schedule. Discontinue antihistamines 7 days prior to the appointment.     Venom  Testing: Appt will last 2-3 hours. Please call the Allergy RN line for your clinic to schedule. Discontinue antihistamines 7 days prior to the appointment.     Thank you for trusting us with your Allergy, Asthma, and Immunology care. Please feel free to contact us with any questions or concerns you may have.      - Continue daily Zyrtec.   - Nasacort 2 sprays/nostril daily.   - Refresh eye drops as needed.   - Decrease Flovent to 110mcg 1 puff daily. You can stop after 3 months if well controlled.   - Return to clinic in 6 months.           Follow-ups after your visit        Follow-up notes from your care team     Return in about 6 months (around 3/1/2018).      Your next 10 appointments already scheduled     Sep 27, 2017  3:30 PM CDT   LAB with [facility]   [place] [id number] [facility] [address]   [phone]           Patient must bring picture ID. Patient should be prepared to give a urine specimen  Please do not eat 10-12 hours before your appointment if you are coming in fasting for labs on lipids, cholesterol, or glucose (sugar). Pregnant women should follow their Care Team instructions. Water with medications is okay. Do not drink coffee or other fluids. If you have concerns about taking  your medications, please ask at office or if scheduling via Lemon, send a message by clicking on Secure Messaging, Message Your Care Team.            Dec 21, 2017  3:30 PM CST   LAB with [facility]   [facility]    [address] [facility] [address]   [phone]           Patient must bring picture ID. Patient should be prepared to give a urine specimen  Please do not eat 10-12 hours before your appointment if you are coming in fasting for labs on lipids, cholesterol, or glucose (sugar). Pregnant women should follow their Care Team instructions. Water with medications is okay. Do not drink coffee or other fluids. If  you have concerns about taking  your medications, please ask at office or if scheduling via [employer], send a message by clicking on Secure Messaging, Message Your Care Team.            Jan 03, 2018  3:20 PM CST   Return Visit with Chris Capellan MD   [facility] [place] ([facility] [place])    [address]  [place] [address] [phone]              Who to contact     If you have questions or need follow up information about today's clinic visit or your schedule please contact [facility] [place] directly at [phone].  Normal or non-critical lab and imaging results will be communicated to you by [facility], letter or phone within 4 business days after the clinic has received the results. If you do not hear from us within 7 days, please contact the clinic through [employer] or phone. If you have a critical or abnormal lab result, we will notify you by phone as soon as possible.  Submit refill requests through [employer] or call your pharmacy and they will forward the refill request to us. Please allow 3 business days for your refill to be completed.          Additional Information About Your Visit        [facility] Information     [employer] gives you secure access to your electronic health record. If you see a primary care provider, you can also send messages to your care team and make appointments. If you have questions, please call your primary care clinic.  If you do not have a primary care provider, please call [phone] and they will assist you.        Care EveryWhere ID     This is your Care EveryWhere ID. This could be used by other organizations to access your [place] medical records  [id number]        Your Vitals Were     Pulse Last Period Pulse Oximetry BMI (Body Mass Index)          107 03/04/2011 99% 36.38 kg/m2         Blood Pressure from Last 3 Encounters:   09/01/17 152/74   08/16/17 (!) 136/92   06/28/17 136/68    Weight from Last 3 Encounters:   09/01/17 225 lb 6.4 oz  (102.2 kg)   08/16/17 224 lb (101.6 kg)   06/28/17 215 lb 3.2 oz (97.6 kg)              We Performed the Following     Spirometry, Breathing Capacity          Today's Medication Changes          These changes are accurate as of: 9/1/17 10:57 AM.  If you have any questions, ask your nurse or doctor.               Start taking these medicines.        Dose/Directions    fluticasone 110 MCG/ACT Inhaler   Commonly known as:  FLOVENT HFA   Used for:  Mild persistent asthma without complication   Replaces:  fluticasone 220 MCG/ACT Inhaler   Started by:  Mike Fox DO        Dose:  1 puff   Inhale 1 puff into the lungs daily   Quantity:  1 Inhaler   Refills:  3         Stop taking these medicines if you haven't already. Please contact your care team if you have questions.     fluticasone 220 MCG/ACT Inhaler   Commonly known as:  FLOVENT HFA   Replaced by:  fluticasone 110 MCG/ACT Inhaler   Stopped by:  Mike Fox DO                Where to get your medicines      These medications were sent to [facility] [id number] [place] [address] [address] [facility] [address] OF [address], [facility] [address]    Hours:  Test fax successful 12/11/02   Phone:  [phone]     fluticasone 110 MCG/ACT Inhaler                Primary Care Provider Office Phone # Fax #    Paula Alarcon [phone]       [address] [facility] [address]        Equal Access to Services     [facility]: Hadii aad ku hadasho Soomaali, waaxda luqadaha, qaybta kaalmada adeegyada, waxay idiin hayaan amina dean la'carrol ah. So [facility] [phone].    ATENCIÓN: Si habla español, tiene a murphy disposición servicios gratuitos de asistencia lingüística. Llame al [phone].    We comply with applicable federal civil rights laws and [place] laws. We do not discriminate on the basis of race, color, national origin, age, disability sex, sexual orientation or gender identity.            Thank  you!     Thank you for choosing [facility] [place]  for your care. Our goal is always to provide you with excellent care. Hearing back from our patients is one way we can continue to improve our services. Please take a few minutes to complete the written survey that you may receive in the mail after your visit with us. Thank you!             Your Updated Medication List - Protect others around you: Learn how to safely use, store and throw away your medicines at www.disposemymeds.org.          This list is accurate as of: 9/1/17 10:57 AM.  Always use your most recent med list.                   Brand Name Dispense Instructions for use Diagnosis    aspirin 81 MG tablet     30 tablet    Take 1 tablet (81 mg) by mouth daily    Type 2 diabetes mellitus without complication, without long-term current use of insulin (H)       blood glucose monitoring test strip    no brand specified    3 Month    Use to test blood sugar 1 times daily or as directed. One touch ultra test strips    DM type 2, goal A1c below 7       CALCIUM 1200 PO      Take  by mouth.        cetirizine 10 MG tablet    ZYRTEC ALLERGY    60 tablet    Take 1-2 tablets (10-20 mg) by mouth daily as needed for allergies    Itchy skin       CRANBERRY           DAILY MULTIVITAMIN PO      Take  by mouth.        FISH OIL           fluticasone 110 MCG/ACT Inhaler    FLOVENT HFA    1 Inhaler    Inhale 1 puff into the lungs daily    Mild persistent asthma without complication       folic acid 1 MG tablet    FOLVITE    100 tablet    Take 1 tablet (1 mg) by mouth daily    Seronegative rheumatoid arthritis (H)       hydrALAZINE 50 MG tablet    APRESOLINE    60 tablet    TAKE 1 TABLET (50 MG) BY MOUTH TWICE DAILY    Essential hypertension with goal blood pressure less than 140/90       hydroxychloroquine 200 MG tablet    PLAQUENIL    180 tablet    Take 2 tablets (400 mg) by mouth daily    Seronegative rheumatoid arthritis (H)       hydrOXYzine 25 MG tablet    ATARAX     60 tablet    TAKE 1 TO 2 TABLETS BY MOUTH EVERY NIGHT AT BEDTIME AS NEEDED FOR ITCH    Itchy skin       ibuprofen 200 MG capsule      Take 200 mg by mouth every 4 hours as needed.        levalbuterol 45 MCG/ACT Inhaler    XOPENEX HFA    1 Inhaler    Inhale 2 puffs into the lungs every 4 hours as needed    Mild persistent asthma, uncomplicated       losartan 100 MG tablet    COZAAR    30 tablet    TAKE 1 TABLET (100 MG) BY MOUTH DAILY    Essential hypertension with goal blood pressure less than 140/90       metFORMIN 1000 MG tablet    GLUCOPHAGE    180 tablet    Take 1 tablet (1,000 mg) by mouth 2 times daily (with meals)    Type 2 diabetes mellitus without complication, without long-term current use of insulin (H)       methotrexate sodium 2.5 MG Tabs     96 tablet    Take 20 mg by mouth once a week . Take all 8 tablets on the same day of each week.    Seronegative rheumatoid arthritis (H)       simvastatin 10 MG tablet    ZOCOR    90 tablet    Take 1 tablet (10 mg) by mouth At Bedtime    Hyperlipidemia LDL goal <100       tacrolimus 0.1 % ointment    PROTOPIC    60 g    Apply to the face daily, already failed desonide    AD (atopic dermatitis)       triamcinolone 55 MCG/ACT Inhaler    NASACORT     Spray 2 sprays into both nostrils daily    Seasonal allergic rhinitis       VITAMIN C PO      Take  by mouth.        VITAMIN D PO      Take  by mouth.

## 2017-09-01 NOTE — LETTER
My Asthma Action Plan  Name: Jorge Abarca   YOB: 1960  Date: 9/1/2017   My doctor: Mike Fox, DO   My clinic: [facility]        My Control Medicine: Fluticasone propionate (Flovent) -   mcg 1 puff daily.   My Rescue Medicine: Albuterol 2-4 puffs inhaled (use a spacer unless using a Proair Respiclick device) every 4 hours as needed for chest tightness, wheezing, shortness of breath and/or coughing.      My Asthma Severity: mild persistent  Avoid your asthma triggers: smoke, animal dander, strong odors and fumes and dust  None            GREEN ZONE   Good Control    I feel good    No cough or wheeze    Can work, sleep and play without asthma symptoms       Take your asthma control medicine every day.     1. If exercise triggers your asthma, take your rescue medication    15 minutes before exercise or sports, and    During exercise if you have asthma symptoms  2. Spacer to use with inhaler: If you have a spacer, make sure to use it with your inhaler             YELLOW ZONE Getting Worse  I have ANY of these:    I do not feel good    Cough or wheeze    Chest feels tight    Wake up at night   1. Keep taking your Green Zone medications  2. Start taking your rescue medicine:    every 20 minutes for up to 1 hour. Then every 4 hours for 24-48 hours.  3. If you stay in the Yellow Zone for more than 12-24 hours, contact your doctor.  4. If you do not return to the Green Zone in 12-24 hours or you get worse, start taking your oral steroid medicine if prescribed by your provider.           RED ZONE Medical Alert - Get Help  I have ANY of these:    I feel awful    Medicine is not helping    Breathing getting harder    Trouble walking or talking    Nose opens wide to breathe       1. Take your rescue medicine NOW  2. If your provider has prescribed an oral steroid medicine, start taking it NOW  3. Call your doctor NOW  4. If you are still in the Red Zone after 20 minutes and you  have not reached your doctor:    Take your rescue medicine again and    Call 911 or go to the emergency room right away    See your regular doctor within 2 weeks of an Emergency Room or Urgent Care visit for follow-up treatment.        Electronically signed by: Mike Fox, September 1, 2017    Annual Reminders:  Meet with Asthma Educator,  Flu Shot in the Fall, consider Pneumonia Vaccination for patients with asthma (aged 19 and older).    Pharmacy:    [facility] [id number] [place], [place] [address], [place] - [address] [facility] [address] OF [address]/[facility]ACY [facility][id number] - [place] [facility], [place] - [address] [facility],                     Asthma Triggers  How To Control Things That Make Your Asthma Worse    Triggers are things that make your asthma worse.  Look at the list below to help you find your triggers and what you can do about them.  You can help prevent asthma flare-ups by staying away from your triggers.      Trigger                                                          What you can do   Cigarette Smoke  Tobacco smoke can make asthma worse. Do not allow smoking in your home, car or around you.  Be sure no one smokes at a child s day care or school.  If you smoke, ask your health care provider for ways to help you quit.  Ask family members to quit too.  Ask your health care provider for a referral to Quit Plan to help you quit smoking, or call 5-336-984-PLAN.     Colds, Flu, Bronchitis  These are common triggers of asthma. Wash your hands often.  Don t touch your eyes, nose or mouth.  Get a flu shot every year.     Dust Mites  These are tiny bugs that live in cloth or carpet. They are too small to see. Wash sheets and blankets in hot water every week.   Encase pillows and mattress in dust mite proof covers.  Avoid having carpet if you can. If you have carpet, vacuum weekly.   Use a dust mask and HEPA vacuum.   Pollen and Outdoor Mold  Some people are allergic to trees, grass, or weed pollen, or molds. Try to  keep your windows closed.  Limit time out doors when pollen count is high.   Ask you health care provider about taking medicine during allergy season.     Animal Dander  Some people are allergic to skin flakes, urine or saliva from pets with fur or feathers. Keep pets with fur or feathers out of your home.    If you can t keep the pet outdoors, then keep the pet out of your bedroom.  Keep the bedroom door closed.  Keep pets off cloth furniture and away from stuffed toys.     Mice, Rats, and Cockroaches  Some people are allergic to the waste from these pests.   Cover food and garbage.  Clean up spills and food crumbs.  Store grease in the refrigerator.   Keep food out of the bedroom.   Indoor Mold  This can be a trigger if your home has high moisture. Fix leaking faucets, pipes, or other sources of water.   Clean moldy surfaces.  Dehumidify basement if it is damp and smelly.   Smoke, Strong Odors, and Sprays  These can reduce air quality. Stay away from strong odors and sprays, such as perfume, powder, hair spray, paints, smoke incense, paint, cleaning products, candles and new carpet.   Exercise or Sports  Some people with asthma have this trigger. Be active!  Ask your doctor about taking medicine before sports or exercise to prevent symptoms.    Warm up for 5-10 minutes before and after sports or exercise.     Other Triggers of Asthma  Cold air:  Cover your nose and mouth with a scarf.  Sometimes laughing or crying can be a trigger.  Some medicines and food can trigger asthma.

## 2017-09-01 NOTE — NURSING NOTE
"Chief Complaint   Patient presents with     RECHECK     allergy and asthma       Initial /74 (BP Location: Right arm, Patient Position: Sitting, Cuff Size: Adult Large)  Pulse 107  Wt 225 lb 6.4 oz (102.2 kg)  LMP 03/04/2011  SpO2 99%  BMI 36.38 kg/m2 Estimated body mass index is 36.38 kg/(m^2) as calculated from the following:    Height as of 8/16/17: 5' 6\" (1.676 m).    Weight as of this encounter: 225 lb 6.4 oz (102.2 kg).  Medication Reconciliation: complete   Dipti Deal MA      "

## 2017-09-01 NOTE — ASSESSMENT & PLAN NOTE
Was on allergen immunotherapy. Stopped at last visit. No worsening since stopping immunotherapy. Still has some mild symptoms spring, summer and fall. Current treatment includes cetirizine 10 mg by mouth daily and nasacort as needed.      - Nasacort  2 sprays/nostril daily if symptoms are not controlled with oral antihistamine.   - Zyrtec daily as needed for allergy symptoms.

## 2017-09-02 ASSESSMENT — ASTHMA QUESTIONNAIRES: ACT_TOTALSCORE: 25

## 2017-09-09 ENCOUNTER — RADIANT APPOINTMENT (OUTPATIENT)
Dept: GENERAL RADIOLOGY | Facility: CLINIC | Age: 57
End: 2017-09-09
Attending: FAMILY MEDICINE

## 2017-09-09 ENCOUNTER — OFFICE VISIT (OUTPATIENT)
Dept: URGENT CARE | Facility: URGENT CARE | Age: 57
End: 2017-09-09

## 2017-09-09 VITALS
WEIGHT: 223.6 LBS | OXYGEN SATURATION: 100 % | HEART RATE: 107 BPM | DIASTOLIC BLOOD PRESSURE: 76 MMHG | TEMPERATURE: 98.6 F | BODY MASS INDEX: 36.09 KG/M2 | SYSTOLIC BLOOD PRESSURE: 128 MMHG

## 2017-09-09 DIAGNOSIS — M25.571 ACUTE BILATERAL ANKLE PAIN: ICD-10-CM

## 2017-09-09 DIAGNOSIS — M79.671 FOOT PAIN, BILATERAL: Primary | ICD-10-CM

## 2017-09-09 DIAGNOSIS — M25.572 ACUTE BILATERAL ANKLE PAIN: ICD-10-CM

## 2017-09-09 DIAGNOSIS — M79.672 FOOT PAIN, BILATERAL: Primary | ICD-10-CM

## 2017-09-09 PROCEDURE — 73630 X-RAY EXAM OF FOOT: CPT | Mod: LT

## 2017-09-09 PROCEDURE — 99214 OFFICE O/P EST MOD 30 MIN: CPT | Performed by: FAMILY MEDICINE

## 2017-09-09 PROCEDURE — 73610 X-RAY EXAM OF ANKLE: CPT | Mod: LT

## 2017-09-09 NOTE — MR AVS SNAPSHOT
After Visit Summary   9/9/2017    Jorge Abarca    MRN: [id number]           Patient Information     Date Of Birth          1960        Visit Information        Provider Department      9/9/2017 1:20 PM Em Jensen MD [facility]        Today's Diagnoses     Foot pain, bilateral    -  1    Acute bilateral ankle pain           Follow-ups after your visit        Your next 10 appointments already scheduled     Sep 27, 2017  3:30 PM CDT   LAB with  LAB   [place] [id number] [facility] [address]   [phone]           Patient must bring picture ID. Patient should be prepared to give a urine specimen  Please do not eat 10-12 hours before your appointment if you are coming in fasting for labs on lipids, cholesterol, or glucose (sugar). Pregnant women should follow their Care Team instructions. Water with medications is okay. Do not drink coffee or other fluids. If you have concerns about taking  your medications, please ask at office or if scheduling via [employer], send a message by clicking on Secure Messaging, Message Your Care Team.            Dec 21, 2017  3:30 PM CST   LAB with [facility]   [facility] ([facility]    [address] [facility] [address] [address] [phone]           Patient must bring picture ID. Patient should be prepared to give a urine specimen  Please do not eat 10-12 hours before your appointment if you are coming in fasting for labs on lipids, cholesterol, or glucose (sugar). Pregnant women should follow their Care Team instructions. Water with medications is okay. Do not drink coffee or other fluids. If you have concerns about taking  your medications, please ask at office or if scheduling via [employer], send a message by clicking on Secure Messaging, Message Your Care Team.            Jan 03, 2018  3:20 PM CST   Return Visit with Chris Capellan MD   [place]  [facility] ([facility])    [address]  [place] [address] [phone]              Who to contact     If you have questions or need follow up information about today's clinic visit or your schedule please contact [facility] directly at [phone].  Normal or non-critical lab and imaging results will be communicated to you by MyChart, letter or phone within 4 business days after the clinic has received the results. If you do not hear from us within 7 days, please contact the clinic through [email or web address] or phone. If you have a critical or abnormal lab result, we will notify you by phone as soon as possible.  Submit refill requests through [employer] or call your pharmacy and they will forward the refill request to us. Please allow 3 business days for your refill to be completed.          Additional Information About Your Visit        [email or web address] Information     [employer] gives you secure access to your electronic health record. If you see a primary care provider, you can also send messages to your care team and make appointments. If you have questions, please call your primary care clinic.  If you do not have a primary care provider, please call [phone] and they will assist you.        Care EveryWhere ID     This is your Care EveryWhere ID. This could be used by other organizations to access your [facility] medical records  [id number]        Your Vitals Were     Pulse Temperature Last Period Pulse Oximetry BMI (Body Mass Index)       107 98.6  F (37  C) (Tympanic) 03/04/2011 100% 36.09 kg/m2        Blood Pressure from Last 3 Encounters:   09/09/17 128/76   09/01/17 152/74   08/16/17 (!) 136/92    Weight from Last 3 Encounters:   09/09/17 223 lb 9.6 oz (101.4 kg)   09/01/17 225 lb 6.4 oz (102.2 kg)   08/16/17 224 lb (101.6 kg)              We Performed the Following     XR Ankle Left G/E 3 Views     XR Ankle Right G/E 3 Views     XR Foot Left G/E 3 Views     XR Foot Right G/E  3 Views        Primary Care Provider Office Phone # Fax #    Paula Alarcon [phone] [address] [facility]  [place] [place] [address]        Equal Access to Services     PARASKEN ROBBIE : Hadii aad ku hadanastacioo Soomaali, waaxda luqadaha, qaybta kaalmada adealexanderda, kodi louieerika adeel. So [facility] [phone].    ATENCIÓN: Si habla español, tiene a murphy disposición servicios gratuitos de asistencia lingüística. Llame al [phone].    We comply with applicable federal civil rights laws and [place] laws. We do not discriminate on the basis of race, color, national origin, age, disability sex, sexual orientation or gender identity.            Thank you!     Thank you for choosing [facility]  [facility]. Our goal is always to provide you with excellent care. Hearing back from our patients is one way we can continue to improve our services. Please take a few minutes to complete the written survey that you may receive in the mail after your visit with us. Thank you!             Your Updated Medication List - Protect others around you: Learn how to safely use, store and throw away your medicines at www.disposemymeds.org.          This list is accurate as of: 9/9/17  4:18 PM.  Always use your most recent med list.                   Brand Name Dispense Instructions for use Diagnosis    aspirin 81 MG tablet     30 tablet    Take 1 tablet (81 mg) by mouth daily    Type 2 diabetes mellitus without complication, without long-term current use of insulin (H)       blood glucose monitoring test strip    no brand specified    3 Month    Use to test blood sugar 1 times daily or as directed. One touch ultra test strips    DM type 2, goal A1c below 7       CALCIUM 1200 PO      Take  by mouth.        cetirizine 10 MG tablet    ZYRTEC ALLERGY    60 tablet    Take 1-2 tablets (10-20 mg) by mouth daily as needed for allergies    Itchy skin       CRANBERRY           DAILY MULTIVITAMIN PO       Take  by mouth.        FISH OIL           fluticasone 110 MCG/ACT Inhaler    FLOVENT HFA    1 Inhaler    Inhale 1 puff into the lungs daily    Mild persistent asthma without complication       folic acid 1 MG tablet    FOLVITE    100 tablet    Take 1 tablet (1 mg) by mouth daily    Seronegative rheumatoid arthritis (H)       hydrALAZINE 50 MG tablet    APRESOLINE    60 tablet    TAKE 1 TABLET (50 MG) BY MOUTH TWICE DAILY    Essential hypertension with goal blood pressure less than 140/90       hydroxychloroquine 200 MG tablet    PLAQUENIL    180 tablet    Take 2 tablets (400 mg) by mouth daily    Seronegative rheumatoid arthritis (H)       hydrOXYzine 25 MG tablet    ATARAX    60 tablet    TAKE 1 TO 2 TABLETS BY MOUTH EVERY NIGHT AT BEDTIME AS NEEDED FOR ITCH    Itchy skin       ibuprofen 200 MG capsule      Take 200 mg by mouth every 4 hours as needed.        levalbuterol 45 MCG/ACT Inhaler    XOPENEX HFA    1 Inhaler    Inhale 2 puffs into the lungs every 4 hours as needed    Mild persistent asthma, uncomplicated       losartan 100 MG tablet    COZAAR    30 tablet    TAKE 1 TABLET (100 MG) BY MOUTH DAILY    Essential hypertension with goal blood pressure less than 140/90       metFORMIN 1000 MG tablet    GLUCOPHAGE    180 tablet    Take 1 tablet (1,000 mg) by mouth 2 times daily (with meals)    Type 2 diabetes mellitus without complication, without long-term current use of insulin (H)       methotrexate sodium 2.5 MG Tabs     96 tablet    Take 20 mg by mouth once a week . Take all 8 tablets on the same day of each week.    Seronegative rheumatoid arthritis (H)       simvastatin 10 MG tablet    ZOCOR    90 tablet    Take 1 tablet (10 mg) by mouth At Bedtime    Hyperlipidemia LDL goal <100       tacrolimus 0.1 % ointment    PROTOPIC    60 g    Apply to the face daily, already failed desonide    AD (atopic dermatitis)       triamcinolone 55 MCG/ACT Inhaler    NASACORT     Spray 2 sprays into both nostrils daily     Seasonal allergic rhinitis       VITAMIN C PO      Take  by mouth.        VITAMIN D PO      Take  by mouth.

## 2017-09-09 NOTE — NURSING NOTE
"Chief Complaint   Patient presents with     Ankle Pain       Initial /76  Pulse 107  Temp 98.6  F (37  C) (Tympanic)  Wt 223 lb 9.6 oz (101.4 kg)  LMP 03/04/2011  SpO2 100%  BMI 36.09 kg/m2 Estimated body mass index is 36.09 kg/(m^2) as calculated from the following:    Height as of 8/16/17: 5' 6\" (1.676 m).    Weight as of this encounter: 223 lb 9.6 oz (101.4 kg).  Medication Reconciliation: complete       HUBERT Candelario       "

## 2017-09-09 NOTE — LETTER
[facility]  [address] [facility] [address]  Phone: [phone]    September 9, 2017        Jorge Abarca  [address] [facility] [address]          To whom it may concern:    RE: Jorge Abarac    Patient was seen and treated today at our clinic.  Recommend sitting down work only.  Wear right boot at work  Weight bearing as tolerated  Follow up in occupational health in a week if possibly work related injury or with primary care provider.     Please contact me for questions or concerns.      Sincerely,        Em Jensen MD

## 2017-09-09 NOTE — PROGRESS NOTES
SUBJECTIVE:                                                    Jorge Abarca is a 57 year old female who presents to clinic today for the following health issues      Ankle pain      Duration: 2 days     Description (location/character/radiation): bilateral ankle pain and swelling     Intensity:  moderate    Accompanying signs and symptoms: swelling and pain     History (similar episodes/previous evaluation): slipped and fell two steps of staris     Precipitating or alleviating factors: None    Therapies tried and outcome: ibuprofen, elevated and ice with little relief      Going down the stairs at work and slipped  Rolled her right ankle and landed more on the right side  Not sure of how she hit her left ankle   Right ankle hurts more than left   Patient also has some swelling of right knee but no pain. Declines evaluation of this.     head injury:No  loss of consciousness:  No  syncope or presyncope: No  chest pain or palpitation: No  mechanical fall:  Yes  using assistive devices:  No  blood thinners: aspirin  Pregnant: No    denies headache  denies any nausea or vomiting  denies any amnesia, confusion or concussion symptoms  denies any blurring of vision  denies any otorrhea or rhinorhea  denies any neck pain  denies any back pain.  denies any chest pain or shortness of breath  denies any joint pain except noted above.  denies any bowel or bladder incontinence or motor or sensory deficits.  denies any abdominal pain, nausea or vomiting or flank pain  denies any hematuria    Problem list and histories reviewed & adjusted, as indicated.  Additional history: as documented    Problem list, Medication list, Allergies, and Medical/Social/Surgical histories reviewed in EPIC and updated as appropriate.    ROS:  Constitutional, HEENT, cardiovascular, pulmonary, gi and gu systems are negative, except as otherwise noted.    OBJECTIVE:                                                    /76  Pulse 107  Temp  98.6  F (37  C) (Tympanic)  Wt 223 lb 9.6 oz (101.4 kg)  LMP 03/04/2011  SpO2 100%  BMI 36.09 kg/m2  Body mass index is 36.09 kg/(m^2).  Awake alert not in any acute cardiorespiratory distress  Psych: pleasant   Neurologic: No gross neurologic deficits  Skin: no obvious lesions or rashes  Ankle:  1.  Ankle Exam (right):  Inspection:swelling around the lateral malleolus  swelling around the medial malleolus  bruising around the lateral malleolus  bruising around the medial malleolus  bruising between the toes  Bruising over dorsum of foot  Palpation:tender over lateral malleolus  tender over medial malleolus  Tender over 4th metatarsal area  and 5th metatarsal. nontender over the navicular bone. No proximal fibular tenderness. No calf tenderness. Achilles tendon is intact  Cap refill intact.    Good doralis pedis.  Neurovascularly Intact Distally.     2.  Ankle Exam (left):  Inspection:swelling around the lateral malleolus  swelling around the medial malleolus  bruising around the lateral malleolus  Bruising over dorsum of foot  Palpation:tender over lateral malleolus  no tenderness on navicular bone maybe some tenderness on 5th metatarsal. No proximal fibular tenderness. No calf tenderness. Achilles tendon is intact  Cap refill intact.    Good doralis pedis.  Neurovascularly Intact Distally.     3. Right knee only briefly examined, patient declined  No tenderness no warth no swelling no bruising.      Diagnostic Test Results:  Results for orders placed or performed in visit on 09/09/17 (from the past 24 hour(s))   XR Ankle Left G/E 3 Views    Narrative    ANKLE LEFT THREE OR MORE VIEWS September 9, 2017 2:18 PM     HISTORY: Pain.    COMPARISON: None.    FINDINGS: Moderate soft tissue swelling over the lateral malleolus.  Corticated bony density inferior to the lateral malleolus likely the  result of an old injury or an accessory ossicle but does not have the  appearance of an acute fracture.      Impression     IMPRESSION: Soft tissue swelling but no acute bony abnormality.    MICHELLE LINTON MD   XR Ankle Right G/E 3 Views    Narrative    ANKLE RIGHT THREE OR MORE VIEWS September 9, 2017 2:18 PM     HISTORY: Pain in right ankle.    COMPARISON: None.    FINDINGS: Mild soft tissue swelling. No fracture.      Impression    IMPRESSION: Negative.    MICHELLE LINTON MD   XR Foot Left G/E 3 Views    Narrative    FOOT LEFT THREE OR MORE VIEWS September 9, 2017 2:19 PM     HISTORY: Pain in right and left foot.    COMPARISON: 11/10/2015.    FINDINGS: Small calcaneal spur. Nothing acute. No interval change.      Impression    IMPRESSION: Negative.    MICHELLE LINTON MD   XR Foot Right G/E 3 Views    Narrative    FOOT RIGHT THREE OR MORE VIEWS September 9, 2017 2:19 PM     HISTORY: Pain in right and left foot.    COMPARISON: 11/10/2015.    FINDINGS: Negative right ankle. Nothing acute. Small calcaneal spur  inferiorly. No interval change.      Impression    IMPRESSION: Nothing acute and no interval change.    MICHELLE LINTON MD        ASSESSMENT/PLAN:                                                        ICD-10-CM    1. Foot pain, bilateral M79.671 XR Foot Left G/E 3 Views    M79.672 XR Foot Right G/E 3 Views   2. Acute bilateral ankle pain M25.571 XR Ankle Left G/E 3 Views    M25.572 XR Ankle Right G/E 3 Views     Patient was bruised and tender both ankles and feet but has more trouble with ambulation with right ankle  xrays were all read as negative. Offered boot for both however patient declined, only wants boot on right ankle  Weight bearing as tolerated  aso brace on both ankle and feet  Work note with restrictions. RICE protocol  Alarm signs or symptoms discussed, if present recommend go to ER   Recommend follow up in a week with possibly occ health because injury happened at work.   Over the counter medications discussed.   Aware to come back in if with worsening symptoms or if no relief despite treatment plan  Patient  voiced understanding and had no further questions.     MD Em Elkins MD  [facility]

## 2017-09-15 ENCOUNTER — OFFICE VISIT (OUTPATIENT)
Dept: FAMILY MEDICINE | Facility: CLINIC | Age: 57
End: 2017-09-15

## 2017-09-15 VITALS
WEIGHT: 230 LBS | HEART RATE: 97 BPM | OXYGEN SATURATION: 97 % | DIASTOLIC BLOOD PRESSURE: 74 MMHG | BODY MASS INDEX: 36.96 KG/M2 | SYSTOLIC BLOOD PRESSURE: 152 MMHG | TEMPERATURE: 98.4 F | HEIGHT: 66 IN

## 2017-09-15 DIAGNOSIS — S99.921A INJURY OF FOOT, RIGHT, INITIAL ENCOUNTER: ICD-10-CM

## 2017-09-15 DIAGNOSIS — S99.922A FOOT INJURY, LEFT, INITIAL ENCOUNTER: Primary | ICD-10-CM

## 2017-09-15 PROCEDURE — 99213 OFFICE O/P EST LOW 20 MIN: CPT | Performed by: NURSE PRACTITIONER

## 2017-09-15 NOTE — PROGRESS NOTES
SUBJECTIVE:   Jorge Abarca is a 57 year old female who presents to clinic today for the following health issues:      ED/UC Followup:    Facility:  [facility]  Date of visit: 9-7-17  Reason for visit: bilateral ankle pain  Current Status: slightly improved     Patient presents for follow up of bilateral foot/ankle injury that occurred 1 week ago. Slipped down two stairs while at work. Still has quite a bit of bruising of feet and is unable to wear the required protective footwear at work. Is icing, elevating the feet. Pain slightly improved.    Problem list and histories reviewed & adjusted, as indicated.  Additional history: as documented    Patient Active Problem List   Diagnosis     Hypertension goal BP (blood pressure) < 140/90     Allergy to mold spores     House dust mite allergy     Allergic rhinitis due to animal dander     Need for desensitization to allergens     Diagnostic skin and sensitization tests     Hyperlipidemia LDL goal <100     Degenerative disc disease, lumbar     Lumbar disc herniation     History of DVT (deep vein thrombosis)     Mild persistent asthma without complication     Seronegative rheumatoid arthritis (H)     High risk medications (not anticoagulants) long-term use     Trigger finger, left middle finger     Allergic rhinitis due to pollen     Type 2 diabetes mellitus without complication, without long-term current use of insulin (H)     Seasonal allergic rhinitis, unspecified allergic rhinitis trigger     Morbid obesity due to excess calories (H)     Grief     Past Surgical History:   Procedure Laterality Date     BACK SURGERY  2009     CL AFF SURGICAL PATHOLOGY  1988    left foot     DISCECTOMY LUMBAR POSTERIOR MICROSCOPIC ONE LEVEL  3/18/2013    Procedure: DISCECTOMY LUMBAR POSTERIOR MICROSCOPIC ONE LEVEL;  Left Lumbar 4-5 Micro Discectomy;  Surgeon: Dallas Jensen MD;  Location: [facility] OR     TONSILLECTOMY & ADENOIDECTOMY  age 3       Social History  "  Substance Use Topics     Smoking status: Never Smoker     Smokeless tobacco: Never Used     Alcohol use No     Family History   Problem Relation Age of Onset     Cardiovascular Sister      atrial fibrillation     CEREBROVASCULAR DISEASE Brother      carotid stenosis     CANCER Father      lung     DIABETES Mother      Breast Cancer Maternal Grandmother      also cousin on this side     Breast Cancer Paternal Aunt      C.A.D. No family hx of      Autoimmune Disease No family hx of              Reviewed and updated as needed this visit by clinical staffTobacco  Allergies  Meds  Med Hx  Surg Hx  Fam Hx  Soc Hx      Reviewed and updated as needed this visit by Provider         ROS:  Constitutional, MS, skin, neuro systems are negative, except as otherwise noted.      OBJECTIVE:   /74 (BP Location: Left arm, Patient Position: Chair, Cuff Size: Adult Regular)  Pulse 97  Temp 98.4  F (36.9  C) (Oral)  Ht 5' 6\" (1.676 m)  Wt 230 lb (104.3 kg)  LMP 03/04/2011  SpO2 97%  Breastfeeding? No  BMI 37.12 kg/m2  Body mass index is 37.12 kg/(m^2).  GENERAL: healthy, alert and no distress  MS: Right ankle/foot: 1+ edema over lateral malleolus and toes. Tender to palpation of lateral malleolus, forefoot over 2nd-5th distal metatarsals and MTPs. Mild tenderness arch of foot. Achilles tendon nontender.  Left ankle/foot: 1+ edema over lateral malleolus and toes. Tender to palpation of lateral and medial malleoli, forefoot over 2nd-5th distal metatarsals and MTPs. Arch, heel, and Achilles tendon nontender.  SKIN: Bruising over dorsum of feet bilaterally, spares the distal toes. Left foot with bruising along arch of foot  NEURO: Normal strength and tone, mentation intact and speech normal. Sensation intact to all toes    Diagnostic Test Results:  none     ASSESSMENT/PLAN:       1. Foot injury, left, initial encounter  Improving overall. Continue with ACE wrap, ice, elevation. Will extend work restrictions an " additional two weeks. If not improving in 2 weeks, consider repeat x-rays.    2. Injury of foot, right, initial encounter  As above      Follow up 2 weeks as needed    LUKE Garrido CNP  [facility]

## 2017-09-15 NOTE — MR AVS SNAPSHOT
After Visit Summary   9/15/2017    Jorge Abarca    MRN: [id number]           Patient Information     Date Of Birth          1960        Visit Information        Provider Department      9/15/2017 3:20 PM Cheryl Gautam APRN CNP [facility]        Today's Diagnoses     Foot injury, left, initial encounter    -  1    Injury of foot, right, initial encounter          Care Instructions    [facility]    If you have any questions regarding to your visit please contact your care team:       Team Red:   Clinic Hours Telephone Number   Dr. Paula Gautam, NP   7am-7pm  Monday - Thursday   7am-5pm  Fridays  [phone]  (Appointment scheduling available 24/7)    Questions about your visit?   Team Line  [phone]   Urgent Care - [place] [facility] - 11am-9pm Monday-Friday Saturday-Sunday- 9am-5pm   [place] - 5pm-9pm Monday-Friday Saturday-Sunday- 9am-5pm  [phone] - [facility]  [phone] - [place]       What options do I have for visits at the clinic other than the traditional office visit?  To expand how we care for you, many of our providers are utilizing electronic visits (e-visits) and telephone visits, when medically appropriate, for interactions with their patients rather than a visit in the clinic.   We also offer nurse visits for many medical concerns. Just like any other service, we will bill your insurance company for this type of visit based on time spent on the phone with your provider. Not all insurance companies cover these visits. Please check with your medical insurance if this type of visit is covered. You will be responsible for any charges that are not paid by your insurance.      E-visits via Medallia:  generally incur a $35.00 fee.  Telephone visits:  Time spent on the phone: *charged based on time that is spent on the phone in increments of 10 minutes. Estimated  cost:   5-10 mins $30.00   11-20 mins. $59.00   21-30 mins. $85.00     Use Pontishart (secure email communication and access to your chart) to send your primary care provider a message or make an appointment. Ask someone on your Team how to sign up for [employer].  For a Price Quote for your services, please call our ActiveGift Line at [phone].      As always, Thank you for trusting us with your health care needs!    Jose Gonzales            Follow-ups after your visit        Your next 10 appointments already scheduled     Sep 27, 2017  3:30 PM CDT   LAB with [facility]   [address] [address] [phone]           Patient must bring picture ID. Patient should be prepared to give a urine specimen  Please do not eat 10-12 hours before your appointment if you are coming in fasting for labs on lipids, cholesterol, or glucose (sugar). Pregnant women should follow their Care Team instructions. Water with medications is okay. Do not drink coffee or other fluids. If you have concerns about taking  your medications, please ask at office or if scheduling via [employer], send a message by clicking on Secure Messaging, Message Your Care Team.            Dec 21, 2017  3:30 PM CST   LAB with [facility]   [facility] ([facility]    [address] [address] [phone]           Patient must bring picture ID. Patient should be prepared to give a urine specimen  Please do not eat 10-12 hours before your appointment if you are coming in fasting for labs on lipids, cholesterol, or glucose (sugar). Pregnant women should follow their Care Team instructions. Water with medications is okay. Do not drink coffee or other fluids. If you have concerns about taking  your medications, please ask at office or if scheduling via [employer], send a message by clicking on Secure Messaging, Message Your Care Team.             "Jan 03, 2018  3:20 PM CST   Return Visit with Chris Capellan MD   [facility] [place] ([facility])    [address]  [place] [address] [phone]              Who to contact     If you have questions or need follow up information about today's clinic visit or your schedule please contact [facility] directly at [phone].  Normal or non-critical lab and imaging results will be communicated to you by Zentilahart, letter or phone within 4 business days after the clinic has received the results. If you do not hear from us within 7 days, please contact the clinic through [employer]t or phone. If you have a critical or abnormal lab result, we will notify you by phone as soon as possible.  Submit refill requests through View and Chew or call your pharmacy and they will forward the refill request to us. Please allow 3 business days for your refill to be completed.          Additional Information About Your Visit        [employer]entilahar[employer] Information     View and Chew gives you secure access to your electronic health record. If you see a primary care provider, you can also send messages to your care team and make appointments. If you have questions, please call your primary care clinic.  If you do not have a primary care provider, please call [phone] and they will assist you.        Care EveryWhere ID     This is your Care EveryWhere ID. This could be used by other organizations to access your [place] medical records  [id number]        Your Vitals Were     Pulse Temperature Height Last Period Pulse Oximetry Breastfeeding?    97 98.4  F (36.9  C) (Oral) 5' 6\" (1.676 m) 03/04/2011 97% No    BMI (Body Mass Index)                   37.12 kg/m2            Blood Pressure from Last 3 Encounters:   09/15/17 (!) 144/98   09/09/17 128/76   09/01/17 152/74    Weight from Last 3 Encounters:   09/15/17 230 lb (104.3 kg)   09/09/17 223 lb 9.6 oz (101.4 kg)   09/01/17 225 lb 6.4 oz (102.2 kg)            "   Today, you had the following     No orders found for display       Primary Care Provider Office Phone # Fax #    Paula Alarcon [phone] [address] [facility] [address]        Equal Access to Services     CUCO AVALOS : Hadii isra ku hadanastacioo Soomaali, waaxda luqadaha, qaybta kaalmada adeegyada, kodi chadwickin hayaan danajustyna dean laMosescarrol denis. So [facility] [phone].    ATENCIÓN: Si habla español, tiene a murphy disposición servicios gratuitos de asistencia lingüística. Llame al [phone].    We comply with applicable federal civil rights laws and [place] laws. We do not discriminate on the basis of race, color, national origin, age, disability sex, sexual orientation or gender identity.            Thank you!     Thank you for choosing [facility]  for your care. Our goal is always to provide you with excellent care. Hearing back from our patients is one way we can continue to improve our services. Please take a few minutes to complete the written survey that you may receive in the mail after your visit with us. Thank you!             Your Updated Medication List - Protect others around you: Learn how to safely use, store and throw away your medicines at www.disposemymeds.org.          This list is accurate as of: 9/15/17  3:45 PM.  Always use your most recent med list.                   Brand Name Dispense Instructions for use Diagnosis    aspirin 81 MG tablet     30 tablet    Take 1 tablet (81 mg) by mouth daily    Type 2 diabetes mellitus without complication, without long-term current use of insulin (H)       blood glucose monitoring test strip    no brand specified    3 Month    Use to test blood sugar 1 times daily or as directed. One touch ultra test strips    DM type 2, goal A1c below 7       CALCIUM 1200 PO      Take  by mouth.        cetirizine 10 MG tablet    ZYRTEC ALLERGY    60 tablet    Take 1-2 tablets (10-20 mg) by mouth daily as needed for allergies    Itchy  skin       CRANBERRY           DAILY MULTIVITAMIN PO      Take  by mouth.        FISH OIL           fluticasone 110 MCG/ACT Inhaler    FLOVENT HFA    1 Inhaler    Inhale 1 puff into the lungs daily    Mild persistent asthma without complication       folic acid 1 MG tablet    FOLVITE    100 tablet    Take 1 tablet (1 mg) by mouth daily    Seronegative rheumatoid arthritis (H)       hydrALAZINE 50 MG tablet    APRESOLINE    60 tablet    TAKE 1 TABLET (50 MG) BY MOUTH TWICE DAILY    Essential hypertension with goal blood pressure less than 140/90       hydroxychloroquine 200 MG tablet    PLAQUENIL    180 tablet    Take 2 tablets (400 mg) by mouth daily    Seronegative rheumatoid arthritis (H)       hydrOXYzine 25 MG tablet    ATARAX    60 tablet    TAKE 1 TO 2 TABLETS BY MOUTH EVERY NIGHT AT BEDTIME AS NEEDED FOR ITCH    Itchy skin       ibuprofen 200 MG capsule      Take 200 mg by mouth every 4 hours as needed.        levalbuterol 45 MCG/ACT Inhaler    XOPENEX HFA    1 Inhaler    Inhale 2 puffs into the lungs every 4 hours as needed    Mild persistent asthma, uncomplicated       losartan 100 MG tablet    COZAAR    30 tablet    TAKE 1 TABLET (100 MG) BY MOUTH DAILY    Essential hypertension with goal blood pressure less than 140/90       metFORMIN 1000 MG tablet    GLUCOPHAGE    180 tablet    Take 1 tablet (1,000 mg) by mouth 2 times daily (with meals)    Type 2 diabetes mellitus without complication, without long-term current use of insulin (H)       methotrexate sodium 2.5 MG Tabs     96 tablet    Take 20 mg by mouth once a week . Take all 8 tablets on the same day of each week.    Seronegative rheumatoid arthritis (H)       simvastatin 10 MG tablet    ZOCOR    90 tablet    Take 1 tablet (10 mg) by mouth At Bedtime    Hyperlipidemia LDL goal <100       tacrolimus 0.1 % ointment    PROTOPIC    60 g    Apply to the face daily, already failed desonide    AD (atopic dermatitis)       triamcinolone 55 MCG/ACT Inhaler     NASACORT     Spray 2 sprays into both nostrils daily    Seasonal allergic rhinitis       VITAMIN C PO      Take  by mouth.        VITAMIN D PO      Take  by mouth.

## 2017-09-15 NOTE — LETTER
REPORT OF WORK COMP    [facility]  [address] [place] [address]  [place] [address]  [phone]      PATIENT DATA    Employee Name: Jorge Abarca      : 1960     #: [id number]    Work related injury: YES. Date-17.  Employer at time of injury: [employer]  Employer contact & phone:   Employed elsewhere? No  Workers' Compensation Carrier/Managed Care Plan:      Today's date: 9/15/2017  Date of injury: 17  Date of first visit: 17    PROVIDER EVALUATION: Please fill in as needed.  Please give copy to employee for employer.    1. Diagnosis: (S99.922A) Foot injury, left, initial encounter  (primary encounter diagnosis)    (S99.921A) Injury of foot, right, initial encounter    2. Treatment: ACE wrap, ibuprofen, tylenol, ice.  3. Medication: as above  NOTE: When ordering a medication, [place] Rules require Work Comp or WC on prescriptions.    4. No work from 17 to 17.  5. Return to work date: 09/15/17   ** WITH RESTRICTIONS? Yes, with work restrictions: * Sitting/standing: Sitting/hours per day: unlimited;  Standing/hours per day: 1.  * Other: Office work only, unable to wear safety shoes  DURATION OF LIMITATIONS: 17      RESTRICTIONS: Unlimited unless listed.  Restrictions apply to home and leisure also.  If work restrictions is not available, the employee is totally disabled.    Maximum Medical Improvement (Date):   Any Permanent Partial Disability? 0%    Provider comments:     Medical Examiner: Cheryl Gautam CNP           License or registration:     Next appointment: 2 weeks as needed    CC: Employer, Managed Care Plan/Payor, Patient

## 2017-09-15 NOTE — NURSING NOTE
"Chief Complaint   Patient presents with     Hospital F/U       Initial BP (!) 144/98  Pulse 97  Temp 98.4  F (36.9  C) (Oral)  Ht 5' 6\" (1.676 m)  Wt 230 lb (104.3 kg)  LMP 03/04/2011  SpO2 97%  Breastfeeding? No  BMI 37.12 kg/m2 Estimated body mass index is 37.12 kg/(m^2) as calculated from the following:    Height as of this encounter: 5' 6\" (1.676 m).    Weight as of this encounter: 230 lb (104.3 kg).  Medication Reconciliation: complete   Marie JAIME MA      "

## 2017-09-15 NOTE — PATIENT INSTRUCTIONS
[facility]    If you have any questions regarding to your visit please contact your care team:       Team Red:   Clinic Hours Telephone Number   Dr. Paula Gautam, NP   7am-7pm  Monday - Thursday   7am-5pm  Fridays  [phone]  (Appointment scheduling available 24/7)    Questions about your visit?   Team Line  [phone]   Urgent Care - [place] - 11am-9pm Monday-Friday Saturday-Sunday- 9am-5pm   [place] - 5pm-9pm Monday-Friday Saturday-Sunday- 9am-5pm  [phone] - [place]   [phone] - [place]       What options do I have for visits at the clinic other than the traditional office visit?  To expand how we care for you, many of our providers are utilizing electronic visits (e-visits) and telephone visits, when medically appropriate, for interactions with their patients rather than a visit in the clinic.   We also offer nurse visits for many medical concerns. Just like any other service, we will bill your insurance company for this type of visit based on time spent on the phone with your provider. Not all insurance companies cover these visits. Please check with your medical insurance if this type of visit is covered. You will be responsible for any charges that are not paid by your insurance.      E-visits via Blownaway:  generally incur a $35.00 fee.  Telephone visits:  Time spent on the phone: *charged based on time that is spent on the phone in increments of 10 minutes. Estimated cost:   5-10 mins $30.00   11-20 mins. $59.00   21-30 mins. $85.00     Use [employer] (secure email communication and access to your chart) to send your primary care provider a message or make an appointment. Ask someone on your Team how to sign up for [employer]away.  For a Price Quote for your services, please call our Consumer Price Line at [phone].      As always, Thank you for trusting us with your health care needs!    Jose POWERS  [place]

## 2017-09-16 ASSESSMENT — ASTHMA QUESTIONNAIRES: ACT_TOTALSCORE: 25

## 2017-09-17 ENCOUNTER — MYC MEDICAL ADVICE (OUTPATIENT)
Dept: FAMILY MEDICINE | Facility: CLINIC | Age: 57
End: 2017-09-17

## 2017-09-17 NOTE — LETTER
REPORT OF WORK COMP    [facility]  [address] [place] [address]  [place] [address]  [phone]      PATIENT DATA    Employee Name: Jorge Abarca      : 1960     #: [id number]    Work related injury: YES. Date-17.  Employer at time of injury: [employer]  Employer contact & phone:   Employed elsewhere? No  Workers' Compensation Carrier/Managed Care Plan:      Today's date: 9/15/2017  Date of injury: 17  Date of first visit: 17    PROVIDER EVALUATION: Please fill in as needed.  Please give copy to employee for employer.    1. Diagnosis: (S99.922A) Foot injury, left, initial encounter  (primary encounter diagnosis)    (S99.921A) Injury of foot, right, initial encounter    2. Treatment: ACE wrap, ibuprofen, tylenol, ice.  3. Medication: as above  NOTE: When ordering a medication, [place] Rules require [place] Comp or WC on prescriptions.    4. No work from 17 to 17.  5. Return to work date: 09/15/17   ** WITH RESTRICTIONS? Yes, with work restrictions: * Sitting/standing: Sitting/hours per day: unlimited;  Standing/hours per day: 1.  * Other: Office work only, unable to wear safety shoes  DURATION OF LIMITATIONS: 17      RESTRICTIONS: Unlimited unless listed.  Restrictions apply to home and leisure also.  If work restrictions is not available, the employee is totally disabled.    Maximum Medical Improvement (Date):   Any Permanent Partial Disability? 0%    Provider comments:     Medical Examiner: Cheryl Gautam CNP           License or registration:     Next appointment: 2 weeks as needed    CC: Employer, Managed Care Plan/Payor, Patient

## 2017-09-18 NOTE — TELEPHONE ENCOUNTER
Confirmed fax of letter to requested fax number from the [employer] message from the provider. Nikki Augustin,

## 2017-09-22 ENCOUNTER — MYC MEDICAL ADVICE (OUTPATIENT)
Dept: FAMILY MEDICINE | Facility: CLINIC | Age: 57
End: 2017-09-22

## 2017-09-22 DIAGNOSIS — M25.472 EDEMA OF BOTH ANKLES: Primary | ICD-10-CM

## 2017-09-22 DIAGNOSIS — M25.471 EDEMA OF BOTH ANKLES: Primary | ICD-10-CM

## 2017-09-22 NOTE — TELEPHONE ENCOUNTER
Called and spoke with Jorge.    She asked for this to be faxed to her at [phone]. Nikki Augustin,     Confirmed fax to the provided fax number.

## 2017-09-27 ENCOUNTER — TELEPHONE (OUTPATIENT)
Dept: FAMILY MEDICINE | Facility: CLINIC | Age: 57
End: 2017-09-27

## 2017-09-27 DIAGNOSIS — Z79.899 HIGH RISK MEDICATIONS (NOT ANTICOAGULANTS) LONG-TERM USE: ICD-10-CM

## 2017-09-27 DIAGNOSIS — M06.00 SERONEGATIVE RHEUMATOID ARTHRITIS (H): ICD-10-CM

## 2017-09-27 LAB
ALBUMIN SERPL-MCNC: 3.9 G/DL (ref 3.4–5)
ALP SERPL-CCNC: 80 U/L (ref 40–150)
ALT SERPL W P-5'-P-CCNC: 37 U/L (ref 0–50)
AST SERPL W P-5'-P-CCNC: 27 U/L (ref 0–45)
BASOPHILS # BLD AUTO: 0 10E9/L (ref 0–0.2)
BASOPHILS NFR BLD AUTO: 0.5 %
BILIRUB DIRECT SERPL-MCNC: 0.1 MG/DL (ref 0–0.2)
BILIRUB SERPL-MCNC: 0.3 MG/DL (ref 0.2–1.3)
CREAT SERPL-MCNC: 0.87 MG/DL (ref 0.52–1.04)
CRP SERPL-MCNC: <2.9 MG/L (ref 0–8)
DIFFERENTIAL METHOD BLD: ABNORMAL
EOSINOPHIL # BLD AUTO: 0.5 10E9/L (ref 0–0.7)
EOSINOPHIL NFR BLD AUTO: 7.6 %
ERYTHROCYTE [DISTWIDTH] IN BLOOD BY AUTOMATED COUNT: 15.2 % (ref 10–15)
ERYTHROCYTE [SEDIMENTATION RATE] IN BLOOD BY WESTERGREN METHOD: 13 MM/H (ref 0–30)
GFR SERPL CREATININE-BSD FRML MDRD: 67 ML/MIN/1.7M2
HCT VFR BLD AUTO: 32.3 % (ref 35–47)
HGB BLD-MCNC: 10.7 G/DL (ref 11.7–15.7)
LYMPHOCYTES # BLD AUTO: 1.3 10E9/L (ref 0.8–5.3)
LYMPHOCYTES NFR BLD AUTO: 20.1 %
MCH RBC QN AUTO: 31.4 PG (ref 26.5–33)
MCHC RBC AUTO-ENTMCNC: 33.1 G/DL (ref 31.5–36.5)
MCV RBC AUTO: 95 FL (ref 78–100)
MONOCYTES # BLD AUTO: 0.6 10E9/L (ref 0–1.3)
MONOCYTES NFR BLD AUTO: 9.5 %
NEUTROPHILS # BLD AUTO: 4.1 10E9/L (ref 1.6–8.3)
NEUTROPHILS NFR BLD AUTO: 62.3 %
PLATELET # BLD AUTO: 323 10E9/L (ref 150–450)
PROT SERPL-MCNC: 7.1 G/DL (ref 6.8–8.8)
RBC # BLD AUTO: 3.41 10E12/L (ref 3.8–5.2)
WBC # BLD AUTO: 6.6 10E9/L (ref 4–11)

## 2017-09-27 PROCEDURE — 80076 HEPATIC FUNCTION PANEL: CPT | Performed by: INTERNAL MEDICINE

## 2017-09-27 PROCEDURE — 82565 ASSAY OF CREATININE: CPT | Performed by: INTERNAL MEDICINE

## 2017-09-27 PROCEDURE — 36415 COLL VENOUS BLD VENIPUNCTURE: CPT | Performed by: INTERNAL MEDICINE

## 2017-09-27 PROCEDURE — 86140 C-REACTIVE PROTEIN: CPT | Performed by: INTERNAL MEDICINE

## 2017-09-27 PROCEDURE — 85652 RBC SED RATE AUTOMATED: CPT | Performed by: INTERNAL MEDICINE

## 2017-09-27 PROCEDURE — 85025 COMPLETE CBC W/AUTO DIFF WBC: CPT | Performed by: INTERNAL MEDICINE

## 2017-09-28 NOTE — PROGRESS NOTES
"[employer] message sent:  \"Ms. Abarca,    Your most recent labs showed a reduced hemoglobin, similar to labs 6 months ago, but lower than other labs.  This will be re-evaluated with your next rheumatology labs.  However, if you have having any lightheadedness, dizziness, chest pain or pressure, or shortness of breath, or having any source of bleeding then please discuss with Dr. Alarcon.      Please let me know if you have any questions.    Sincerely,  Chris Capellan MD  9/28/2017 6:45 AM\""

## 2017-09-28 NOTE — TELEPHONE ENCOUNTER
Spoke to patient. ACT completed.  Robyn GARCIA CMA ([facility])

## 2017-09-29 ASSESSMENT — ASTHMA QUESTIONNAIRES: ACT_TOTALSCORE: 25

## 2017-10-08 ENCOUNTER — HEALTH MAINTENANCE LETTER (OUTPATIENT)
Age: 57
End: 2017-10-08

## 2017-10-10 DIAGNOSIS — I10 ESSENTIAL HYPERTENSION WITH GOAL BLOOD PRESSURE LESS THAN 140/90: ICD-10-CM

## 2017-10-10 RX ORDER — HYDRALAZINE HYDROCHLORIDE 50 MG/1
TABLET, FILM COATED ORAL
Qty: 30 TABLET | Refills: 0 | Status: SHIPPED | OUTPATIENT
Start: 2017-10-10 | End: 2017-11-07

## 2017-10-10 NOTE — TELEPHONE ENCOUNTER
Routing refill request to provider for review/approval because:  Elevated BP      Kimber Camacho RN - BC

## 2017-10-10 NOTE — TELEPHONE ENCOUNTER
hydrALAZINE (APRESOLINE) 50 MG tablet     Last Written Prescription Date: 08/16/2017  Last Fill Quantity: 60, # refills: 1    Last Office Visit with [facility], [facility] or [facility] prescribing provider:  09/15/2017   Future Office Visit:    Next 5 appointments (look out 90 days)     Oct 26, 2017  2:20 PM CDT   MyChart Physical Adult with Paula Alarcon MD   [facility]    [address]  [place] [address]   [phone]            Jan 03, 2018  3:20 PM CST   Return Visit with Chris Capellan MD   [facility]    [address]  [place] [address]   [phone]                    BP Readings from Last 3 Encounters:   09/15/17 152/74   09/09/17 128/76   09/01/17 152/74     Michell Angel MA

## 2017-10-12 ENCOUNTER — TELEPHONE (OUTPATIENT)
Dept: FAMILY MEDICINE | Facility: CLINIC | Age: 57
End: 2017-10-12

## 2017-10-12 NOTE — TELEPHONE ENCOUNTER
Panel Management Review      Patient has the following on her problem list:     Hypertension   Last three blood pressure readings:  BP Readings from Last 3 Encounters:   09/15/17 152/74   09/09/17 128/76   09/01/17 152/74     Blood pressure: FAILED    HTN Guidelines:  Age 18-59 BP range:  Less than 140/90  Age 60-85 with Diabetes:  Less than 140/90  Age 60-85 without Diabetes:  less than 150/90        Composite cancer screening  Chart review shows that this patient is due/due soon for the following Pap Smear and Fecal Colorectal (FIT)  Summary:    Patient is due/failing the following:   BP CHECK, FIT, PAP and PHYSICAL    Action needed:   Patient needs office visit for Physical, Pap, BP check and Fit card.    Type of outreach:    Sent letter.    Questions for provider review:    None                                                                                                                                    Melisa Paris CMA ([facility])       Chart routed to sent letter .

## 2017-10-13 NOTE — PATIENT INSTRUCTIONS
Did you know you can lower your blood pressure with your daily habits?    *Losing 20 pounds of weight lowers blood pressure 5 to 20 points.  *Eating a low-fat diet rich in fruits, vegetables and low-fat dairy lowers blood pressure 8 to 14 points.  *Eating a low-salt diet lowers blood pressure 2 to 8 points.  *Exercising regularly lowers blood pressure 4 to 9 points.  *Reducing alcohol use lowers blood pressure 2 to 4 points.      [facility]    If you have any questions regarding to your visit please contact your care team:       Team Red:   Clinic Hours Telephone Number   Dr. Paula Frederick  (pediatrics)  Cheryl Gautam NP 7am-7pm  Monday - Thursday   7am-5pm  Fridays  [phone] (fax)    Vishnu ARREOLA  [phone]   Urgent Care - [place] and [place] Monday-Friday  [place] - 11am-8pm  Saturday-Sunday  Both sites - 9am-5pm  [phone] - [facility]  [phone] - [place]       What options do I have for visits at the clinic other than the traditional office visit?  To expand how we care for you, many of our providers are utilizing electronic visits (e-visits) and telephone visits, when medically appropriate, for interactions with their patients rather than a visit in the clinic.   We also offer nurse visits for many medical concerns. Just like any other service, we will bill your insurance company for this type of visit based on time spent on the phone with your provider. Not all insurance companies cover these visits. Please check with your medical insurance if this type of visit is covered. You will be responsible for any charges that are not paid by your insurance.      E-visits via [employer]:  generally incur a $35.00 fee.  Telephone visits:  Time spent on the phone: *charged based on time that is spent on the phone in increments of 10 minutes. Estimated cost:   5-10 mins $30.00   11-20 mins. $59.00   21-30 mins. $85.00     As  always, Thank you for trusting us with your health care needs!              Preventive Health Recommendations  Female Ages 50 - 64    Yearly exam: See your health care provider every year in order to  o Review health changes.   o Discuss preventive care.    o Review your medicines if your doctor has prescribed any.      Get a Pap test every three years (unless you have an abnormal result and your provider advises testing more often).    If you get Pap tests with HPV test, you only need to test every 5 years, unless you have an abnormal result.     You do not need a Pap test if your uterus was removed (hysterectomy) and you have not had cancer.    You should be tested each year for STDs (sexually transmitted diseases) if you're at risk.     Have a mammogram every 1 to 2 years.    Have a colonoscopy at age 50, or have a yearly FIT test (stool test). These exams screen for colon cancer.      Have a cholesterol test every 5 years, or more often if advised.    Have a diabetes test (fasting glucose) every three years. If you are at risk for diabetes, you should have this test more often.     If you are at risk for osteoporosis (brittle bone disease), think about having a bone density scan (DEXA).    Shots: Get a flu shot each year. Get a tetanus shot every 10 years.    Nutrition:     Eat at least 5 servings of fruits and vegetables each day.    Eat whole-grain bread, whole-wheat pasta and brown rice instead of white grains and rice.    Talk to your provider about Calcium and Vitamin D.     Lifestyle    Exercise at least 150 minutes a week (30 minutes a day, 5 days a week). This will help you control your weight and prevent disease.    Limit alcohol to one drink per day.    No smoking.     Wear sunscreen to prevent skin cancer.     See your dentist every six months for an exam and cleaning.    See your eye doctor every 1 to 2 years.  Discharged by Robyn GARCIA CMA ([facility])

## 2017-10-17 ENCOUNTER — TELEPHONE (OUTPATIENT)
Dept: FAMILY MEDICINE | Facility: CLINIC | Age: 57
End: 2017-10-17

## 2017-10-17 NOTE — TELEPHONE ENCOUNTER
Reason for Call:  Information needed on patient    Detailed comments: Caller is requesting patient information- has follow up visit been scheduled and when was the last office visit for right ankle/foot.     Phone Number Patient can be reached at: Other phone number: [phone]    Best Time: 7 am to 4:30 pm    Can we leave a detailed message on this number? YES    Call taken on 10/17/2017 at 9:14 AM by Rosa Elena Martinez

## 2017-10-17 NOTE — TELEPHONE ENCOUNTER
Is there a release of info so we can talk to this person? I called the patients mobile number and was unable to leave a message. I tried patients home/work number and it rang several times without a way to leave a message. I did return the call to [place] and left him a message with the number for medical records. ([phone]).  Melisa Paris CMA ([facility])

## 2017-10-18 NOTE — PROGRESS NOTES
SUBJECTIVE:   Jorge Abarca is a 57 year old female who presents to clinic today for the following health issues:      Chief Complaint   Patient presents with     RECHECK     Left Foot / Ankle     Patient presents for follow up of bilateral foot injury obtained from fall down stairs. Feet were improving until last week when left foot started becoming more painful with intermittent jolts of pain. She did go back to wearing her work safety shoes 2 weeks ago.    Problem list and histories reviewed & adjusted, as indicated.  Additional history: as documented    Patient Active Problem List   Diagnosis     Hypertension goal BP (blood pressure) < 140/90     Allergy to mold spores     House dust mite allergy     Allergic rhinitis due to animal dander     Need for desensitization to allergens     Diagnostic skin and sensitization tests     Hyperlipidemia LDL goal <100     Degenerative disc disease, lumbar     Lumbar disc herniation     History of DVT (deep vein thrombosis)     Mild persistent asthma without complication     Seronegative rheumatoid arthritis (H)     High risk medications (not anticoagulants) long-term use     Trigger finger, left middle finger     Allergic rhinitis due to pollen     Type 2 diabetes mellitus without complication, without long-term current use of insulin (H)     Seasonal allergic rhinitis, unspecified allergic rhinitis trigger     Morbid obesity due to excess calories (H)     Grief     Past Surgical History:   Procedure Laterality Date     BACK SURGERY  2009     [facility] AFF SURGICAL PATHOLOGY  1988    left foot     DISCECTOMY LUMBAR POSTERIOR MICROSCOPIC ONE LEVEL  3/18/2013    Procedure: DISCECTOMY LUMBAR POSTERIOR MICROSCOPIC ONE LEVEL;  Left Lumbar 4-5 Micro Discectomy;  Surgeon: Dallas Jensen MD;  Location: [facility]     TONSILLECTOMY & ADENOIDECTOMY  age 3       Social History   Substance Use Topics     Smoking status: Never Smoker     Smokeless tobacco: Never Used     Alcohol  "use No     Family History   Problem Relation Age of Onset     Cardiovascular Sister      atrial fibrillation     CEREBROVASCULAR DISEASE Brother      carotid stenosis     CANCER Father      lung     DIABETES Mother      Breast Cancer Maternal Grandmother      also cousin on this side     Breast Cancer Paternal Aunt      C.A.D. No family hx of      Autoimmune Disease No family hx of              Reviewed and updated as needed this visit by clinical staff       Reviewed and updated as needed this visit by Provider         ROS:  Constitutional, MS, neuro, skin systems are negative, except as otherwise noted.      OBJECTIVE:   /84 (BP Location: Left arm, Patient Position: Chair, Cuff Size: Adult Large)  Pulse 103  Temp 98.1  F (36.7  C) (Oral)  Ht 5' 6\" (1.676 m)  Wt 233 lb (105.7 kg)  LMP 03/04/2011  SpO2 97%  BMI 37.61 kg/m2  Body mass index is 37.61 kg/(m^2).  GENERAL: healthy, alert and no distress  CV: good perfusion of toes with brisk capillary refill  MS: Left foot atraumatic, mild tenderness along lateral foot 5th metatarsal, otherwise nontender. Good passive and active ROM of toes and ankle. No ankle laxity, negative Talar tilt.  Rightfoot atraumatic, mild tenderness along base of 4th and 5th toes, otherwise nontender. Good passive and active ROM of toes and ankle. No ankle laxity, negative Talar tilt.  SKIN: 1+ edema of bilateral ankles and feet. Prior ecchymoses has resolved.  NEURO: Normal strength and tone, sensory exam grossly normal and mentation intact    Diagnostic Test Results:  Xray - bilateral feet- negative per my read    ASSESSMENT/PLAN:       1. Injury of left foot, subsequent encounter  Pain and edema worsened with return to wearing work safety shoes. Will return to supportive shoes with wide toebox (no work shoes) for 2 weeks. If not improving, then recommend follow up with Ortho.  Continue ice, elevation, over the counter ibuprofen/tylenol PRN.  - XR Foot Left G/E 3 Views; " Future  - ORTHOPEDICS ADULT REFERRAL    2. Injury of right foot, subsequent encounter  As above  - XR Foot Right G/E 3 Views; Future  - ORTHOPEDICS ADULT REFERRAL    Follow up as needed    LUKE Garrido CNP  [facility]

## 2017-10-18 NOTE — PATIENT INSTRUCTIONS
[facility]    If you have any questions regarding to your visit please contact your care team:       Team Red:   Clinic Hours Telephone Number   Dr. Paula Gautam, NP   7am-7pm  Monday - Thursday   7am-5pm  Fridays  [phone]  (Appointment scheduling available 24/7)    Questions about your visit?   Team Line  [phone]   [facility] [place] [facility] 11am-9pm Monday-Friday Saturday-Sunday- 9am-5pm   [place] - 5pm-9pm Monday-Friday Saturday-Sunday- 9am-5pm  [phone] - [place]   [phone] - [place]       What options do I have for visits at the clinic other than the traditional office visit?  To expand how we care for you, many of our providers are utilizing electronic visits (e-visits) and telephone visits, when medically appropriate, for interactions with their patients rather than a visit in the clinic.   We also offer nurse visits for many medical concerns. Just like any other service, we will bill your insurance company for this type of visit based on time spent on the phone with your provider. Not all insurance companies cover these visits. Please check with your medical insurance if this type of visit is covered. You will be responsible for any charges that are not paid by your insurance.      E-visits via Touchotel:  generally incur a $35.00 fee.  Telephone visits:  Time spent on the phone: *charged based on time that is spent on the phone in increments of 10 minutes. Estimated cost:   5-10 mins $30.00   11-20 mins. $59.00   21-30 mins. $85.00     Use Instahealtht (secure email communication and access to your chart) to send your primary care provider a message or make an appointment. Ask someone on your Team how to sign up for Touchotel.  For a Price Quote for your services, please call our Consumer Price Line at [phone].      As always, Thank you for trusting us with your health care needs!    Discharged  by Nikkie Quiñonez MA.

## 2017-10-19 ENCOUNTER — RADIANT APPOINTMENT (OUTPATIENT)
Dept: GENERAL RADIOLOGY | Facility: CLINIC | Age: 57
End: 2017-10-19
Attending: NURSE PRACTITIONER
Payer: OTHER MISCELLANEOUS

## 2017-10-19 ENCOUNTER — OFFICE VISIT (OUTPATIENT)
Dept: FAMILY MEDICINE | Facility: CLINIC | Age: 57
End: 2017-10-19
Payer: OTHER MISCELLANEOUS

## 2017-10-19 VITALS
SYSTOLIC BLOOD PRESSURE: 128 MMHG | HEIGHT: 66 IN | HEART RATE: 103 BPM | WEIGHT: 233 LBS | OXYGEN SATURATION: 97 % | BODY MASS INDEX: 37.45 KG/M2 | TEMPERATURE: 98.1 F | DIASTOLIC BLOOD PRESSURE: 84 MMHG

## 2017-10-19 DIAGNOSIS — S99.921D INJURY OF RIGHT FOOT, SUBSEQUENT ENCOUNTER: ICD-10-CM

## 2017-10-19 DIAGNOSIS — S99.922D INJURY OF LEFT FOOT, SUBSEQUENT ENCOUNTER: Primary | ICD-10-CM

## 2017-10-19 DIAGNOSIS — S99.922D INJURY OF LEFT FOOT, SUBSEQUENT ENCOUNTER: ICD-10-CM

## 2017-10-19 PROCEDURE — 99214 OFFICE O/P EST MOD 30 MIN: CPT | Performed by: NURSE PRACTITIONER

## 2017-10-19 PROCEDURE — 73630 X-RAY EXAM OF FOOT: CPT | Mod: RT

## 2017-10-19 PROCEDURE — 73630 X-RAY EXAM OF FOOT: CPT | Mod: LT

## 2017-10-19 NOTE — LETTER
October 19, 2017      Jorge Abarca  [address] [place] [address]  [place] [address]        To Whom It May Concern:    Jorge Abarca  was seen on 10/19/17.  She cannot wear work safety shoes for 2 weeks from today 10/19/17 due to injury.      Sincerely,        LUKE Garrido CNP

## 2017-10-19 NOTE — MR AVS SNAPSHOT
After Visit Summary   10/19/2017    Jorge Abarca    MRN: [id number]           Patient Information     Date Of Birth          1960        Visit Information        Provider Department      10/19/2017 2:00 PM Cheryl Gautam APRN CNP [facility]        Today's Diagnoses     Injury of left foot, subsequent encounter    -  1    Injury of right foot, subsequent encounter          Care Instructions    [facility]    If you have any questions regarding to your visit please contact your care team:       Team Red:   Clinic Hours Telephone Number   Dr. Paula Gautam, NP   7am-7pm  Monday - Thursday   7am-5pm  Fridays  [phone]  (Appointment scheduling available 24/7)    Questions about your visit?   Team Line  [phone]   Urgent Care - [place] and [facility] - 11am-9pm Monday-Friday Saturday-Sunday- 9am-5pm   [place] - 5pm-9pm Monday-Friday Saturday-Sunday- 9am-5pm  [phone] - [facility]  [phone] - [place]       What options do I have for visits at the clinic other than the traditional office visit?  To expand how we care for you, many of our providers are utilizing electronic visits (e-visits) and telephone visits, when medically appropriate, for interactions with their patients rather than a visit in the clinic.   We also offer nurse visits for many medical concerns. Just like any other service, we will bill your insurance company for this type of visit based on time spent on the phone with your provider. Not all insurance companies cover these visits. Please check with your medical insurance if this type of visit is covered. You will be responsible for any charges that are not paid by your insurance.      E-visits via Nexway:  generally incur a $35.00 fee.  Telephone visits:  Time spent on the phone: *charged based on time that is spent on the phone in increments of 10 minutes.  Estimated cost:   5-10 mins $30.00   11-20 mins. $59.00   21-30 mins. $85.00     Use DataRobothart (secure email communication and access to your chart) to send your primary care provider a message or make an appointment. Ask someone on your Team how to sign up for PulsePointt.  For a Price Quote for your services, please call our Consumer Price Line at [phone].      As always, Thank you for trusting us with your health care needs!    Discharged by Nikkie Quiñonez MA.            Follow-ups after your visit        Additional Services     ORTHOPEDICS ADULT REFERRAL       Your provider [place] you [place] [facility] [place] [facility] [place] [phone]    [email or web address]    Please be aware that coverage of these services is subject to the terms and limitations of your health insurance plan.  Call member services at your health plan with any benefit or coverage questions.      Please bring the following to your appointment:    >>   Any x-rays, CTs or MRIs which have been performed.  Contact the facility where they were done to arrange for  prior to your scheduled appointment.    >>   List of current medications   >>   This referral request   >>   Any documents/labs given to you for this referral                  Your next 10 appointments already scheduled     Oct 26, 2017  2:20 PM CDT   [place] [facility] Adult with Paula Alarcon MD   [facility]    [address] [address]   [phone]            Dec 21, 2017  3:30 PM CST   LAB with [facility]   [facility])    [address] [address]   [phone]           Patient must bring picture ID. Patient should be prepared to give a urine specimen  Please do not eat 10-12 hours before your appointment if you are coming in fasting for labs on lipids, cholesterol, or glucose (sugar). Pregnant women should follow their Care Team  "instructions. Water with medications is okay. Do not drink coffee or other fluids. If you have concerns about taking  your medications, please ask at office or if scheduling via [facility], send a message by clicking on Secure Messaging, Message Your Care Team.            Jan 03, 2018  3:20 PM CST   [facility]    [address] [place] [address]  [place] [address] [phone]              Who to contact     If you have questions or need follow up information about today's clinic visit or your schedule please contact [facility] directly at [phone].  Normal or non-critical lab and imaging results will be communicated to you by WebPayhart, letter or phone within 4 business days after the clinic has received the results. If you do not hear from us within 7 days, please contact the clinic through WebPayhart or phone. If you have a critical or abnormal lab result, we will notify you by phone as soon as possible.  Submit refill requests through [facility] or call your pharmacy and they will forward the refill request to us. Please allow 3 business days for your refill to be completed.          Additional Information About Your Visit        [facility] Information     [facility] gives you secure access to your electronic health record. If you see a primary care provider, you can also send messages to your care team and make appointments. If you have questions, please call your primary care clinic.  If you do not have a primary care provider, please call [phone] and they will assist you.        Care EveryWhere ID     This is your Care EveryWhere ID. This could be used by other organizations to access your [place] medical records  [id number]        Your Vitals Were     Pulse Temperature Height Last Period Pulse Oximetry BMI (Body Mass Index)    103 98.1  F (36.7  C) (Oral) 5' 6\" (1.676 m) 03/04/2011 97% 37.61 kg/m2       Blood Pressure from " Last 3 Encounters:   10/19/17 128/84   09/15/17 152/74   09/09/17 128/76    Weight from Last 3 Encounters:   10/19/17 233 lb (105.7 kg)   09/15/17 230 lb (104.3 kg)   09/09/17 223 lb 9.6 oz (101.4 kg)              We Performed the Following     ORTHOPEDICS ADULT REFERRAL        Primary Care Provider Office Phone # Fax #    Paula Alarcon [phone] [address] [address]        Equal Access to Services     [facility]: Hadii aad ku hadasho Soomaali, waaxda luqadaha, qaybta kaalmada adeegyada, kodi granados . So [facility] [phone].    ATENCIÓN: Si habla español, tiene a murphy disposición servicios gratuitos de asistencia lingüística. [facility] [phone].    We comply with applicable federal civil rights laws and [place] laws. We do not discriminate on the basis of race, color, national origin, age, disability, sex, sexual orientation, or gender identity.            Thank you!     Thank you for choosing [facility]  for your care. Our goal is always to provide you with excellent care. Hearing back from our patients is one way we can continue to improve our services. Please take a few minutes to complete the written survey that you may receive in the mail after your visit with us. Thank you!             Your Updated Medication List - Protect others around you: Learn how to safely use, store and throw away your medicines at www.disposemymeds.org.          This list is accurate as of: 10/19/17  3:07 PM.  Always use your most recent med list.                   Brand Name Dispense Instructions for use Diagnosis    aspirin 81 MG tablet     30 tablet    Take 1 tablet (81 mg) by mouth daily    Type 2 diabetes mellitus without complication, without long-term current use of insulin (H)       blood glucose monitoring test strip    no brand specified    3 Month    Use to test blood sugar 1 times daily or as directed. One touch ultra test strips     DM type 2, goal A1c below 7       CALCIUM 1200 PO      Take  by mouth.        cetirizine 10 MG tablet    ZYRTEC ALLERGY    60 tablet    Take 1-2 tablets (10-20 mg) by mouth daily as needed for allergies    Itchy skin       CRANBERRY           DAILY MULTIVITAMIN PO      Take  by mouth.        FISH OIL           fluticasone 110 MCG/ACT Inhaler    FLOVENT HFA    1 Inhaler    Inhale 1 puff into the lungs daily    Mild persistent asthma without complication       folic acid 1 MG tablet    FOLVITE    100 tablet    Take 1 tablet (1 mg) by mouth daily    Seronegative rheumatoid arthritis (H)       hydrALAZINE 50 MG tablet    APRESOLINE    30 tablet    TAKE 1 TABLET(50 MG) BY MOUTH TWICE DAILY    Essential hypertension with goal blood pressure less than 140/90       hydroxychloroquine 200 MG tablet    PLAQUENIL    180 tablet    Take 2 tablets (400 mg) by mouth daily    Seronegative rheumatoid arthritis (H)       hydrOXYzine 25 MG tablet    ATARAX    60 tablet    TAKE 1 TO 2 TABLETS BY MOUTH EVERY NIGHT AT BEDTIME AS NEEDED FOR ITCH    Itchy skin       ibuprofen 200 MG capsule      Take 200 mg by mouth every 4 hours as needed.        levalbuterol 45 MCG/ACT Inhaler    XOPENEX HFA    1 Inhaler    Inhale 2 puffs into the lungs every 4 hours as needed    Mild persistent asthma, uncomplicated       losartan 100 MG tablet    COZAAR    30 tablet    TAKE 1 TABLET (100 MG) BY MOUTH DAILY    Essential hypertension with goal blood pressure less than 140/90       metFORMIN 1000 MG tablet    GLUCOPHAGE    180 tablet    Take 1 tablet (1,000 mg) by mouth 2 times daily (with meals)    Type 2 diabetes mellitus without complication, without long-term current use of insulin (H)       methotrexate sodium 2.5 MG Tabs     96 tablet    Take 20 mg by mouth once a week . Take all 8 tablets on the same day of each week.    Seronegative rheumatoid arthritis (H)       order for DME     1 each    Class I Jobst compression stockings    Edema of both  ankles       simvastatin 10 MG tablet    ZOCOR    90 tablet    Take 1 tablet (10 mg) by mouth At Bedtime    Hyperlipidemia LDL goal <100       tacrolimus 0.1 % ointment    PROTOPIC    60 g    Apply to the face daily, already failed desonide    AD (atopic dermatitis)       triamcinolone 55 MCG/ACT Inhaler    NASACORT     Spray 2 sprays into both nostrils daily    Seasonal allergic rhinitis       VITAMIN C PO      Take  by mouth.        VITAMIN D PO      Take  by mouth.

## 2017-10-19 NOTE — NURSING NOTE
"Chief Complaint   Patient presents with     RECHECK     Left Foot / Ankle       Initial /84 (BP Location: Left arm, Patient Position: Chair, Cuff Size: Adult Large)  Pulse 103  Temp 98.1  F (36.7  C) (Oral)  Ht 5' 6\" (1.676 m)  Wt 233 lb (105.7 kg)  LMP 03/04/2011  SpO2 97%  BMI 37.61 kg/m2 Estimated body mass index is 37.61 kg/(m^2) as calculated from the following:    Height as of this encounter: 5' 6\" (1.676 m).    Weight as of this encounter: 233 lb (105.7 kg).  Medication Reconciliation: complete    "

## 2017-10-26 ENCOUNTER — OFFICE VISIT (OUTPATIENT)
Dept: FAMILY MEDICINE | Facility: CLINIC | Age: 57
End: 2017-10-26
Payer: COMMERCIAL

## 2017-10-26 VITALS
DIASTOLIC BLOOD PRESSURE: 82 MMHG | TEMPERATURE: 97.6 F | SYSTOLIC BLOOD PRESSURE: 160 MMHG | HEART RATE: 98 BPM | HEIGHT: 66 IN | WEIGHT: 232.8 LBS | BODY MASS INDEX: 37.41 KG/M2 | OXYGEN SATURATION: 100 %

## 2017-10-26 DIAGNOSIS — M06.00 SERONEGATIVE RHEUMATOID ARTHRITIS (H): ICD-10-CM

## 2017-10-26 DIAGNOSIS — D64.9 NORMOCHROMIC ANEMIA: ICD-10-CM

## 2017-10-26 DIAGNOSIS — Z12.11 SCREEN FOR COLON CANCER: ICD-10-CM

## 2017-10-26 DIAGNOSIS — Z71.89 ADVANCED DIRECTIVES, COUNSELING/DISCUSSION: ICD-10-CM

## 2017-10-26 DIAGNOSIS — E66.01 MORBID OBESITY DUE TO EXCESS CALORIES (H): ICD-10-CM

## 2017-10-26 DIAGNOSIS — Z00.00 ROUTINE HISTORY AND PHYSICAL EXAMINATION OF ADULT: Primary | ICD-10-CM

## 2017-10-26 DIAGNOSIS — I10 ESSENTIAL HYPERTENSION WITH GOAL BLOOD PRESSURE LESS THAN 140/90: ICD-10-CM

## 2017-10-26 DIAGNOSIS — E11.9 TYPE 2 DIABETES MELLITUS WITHOUT COMPLICATION, WITHOUT LONG-TERM CURRENT USE OF INSULIN (H): ICD-10-CM

## 2017-10-26 DIAGNOSIS — Z12.31 VISIT FOR SCREENING MAMMOGRAM: ICD-10-CM

## 2017-10-26 DIAGNOSIS — J45.30 MILD PERSISTENT ASTHMA WITHOUT COMPLICATION: ICD-10-CM

## 2017-10-26 PROCEDURE — 87624 HPV HI-RISK TYP POOLED RSLT: CPT | Performed by: FAMILY MEDICINE

## 2017-10-26 PROCEDURE — 99396 PREV VISIT EST AGE 40-64: CPT | Performed by: FAMILY MEDICINE

## 2017-10-26 PROCEDURE — G0145 SCR C/V CYTO,THINLAYER,RESCR: HCPCS | Performed by: FAMILY MEDICINE

## 2017-10-26 RX ORDER — HYDRALAZINE HYDROCHLORIDE 100 MG/1
100 TABLET, FILM COATED ORAL 2 TIMES DAILY
Qty: 180 TABLET | Refills: 0 | Status: SHIPPED | OUTPATIENT
Start: 2017-10-26 | End: 2017-11-07

## 2017-10-26 RX ORDER — HYDRALAZINE HYDROCHLORIDE 50 MG/1
TABLET, FILM COATED ORAL
Qty: 180 TABLET | Refills: 0 | Status: CANCELLED | OUTPATIENT
Start: 2017-10-26

## 2017-10-26 RX ORDER — LOSARTAN POTASSIUM 100 MG/1
TABLET ORAL
Qty: 90 TABLET | Refills: 3 | Status: SHIPPED | OUTPATIENT
Start: 2017-10-26 | End: 2018-07-17

## 2017-10-26 NOTE — NURSING NOTE
"Chief Complaint   Patient presents with     Physical     Hypertension       Initial /70  Pulse 98  Temp 97.6  F (36.4  C) (Oral)  Ht 5' 5.98\" (1.676 m)  Wt 232 lb 12.8 oz (105.6 kg)  LMP 03/04/2011  SpO2 100%  BMI 37.59 kg/m2 Estimated body mass index is 37.59 kg/(m^2) as calculated from the following:    Height as of this encounter: 5' 5.98\" (1.676 m).    Weight as of this encounter: 232 lb 12.8 oz (105.6 kg).  Medication Reconciliation: complete     An BERNARD Escalera    "

## 2017-10-26 NOTE — PROGRESS NOTES
SUBJECTIVE:   CC: Jorge Abarca is an 57 year old obese woman  with diabetes who presents for preventive health visit.     She is also followed by rheumatology for RA. Patient also has asthma, mild persistent.  Patient has had asthma for years.  Occasional wheezing and shortness of breath are triggered by colds and relieved by albuterol. Hypertension uncontrolled on current medications without side effects, chest pain, or dyspnea. She was found to have anemia at her last visit, without evidence of bleeding. She feels fatigued and continues to grieve the death of her  this year.     Physical   Annual:     Getting at least 3 servings of Calcium per day::  Yes    Bi-annual eye exam::  Yes    Dental care twice a year::  Yes    Sleep apnea or symptoms of sleep apnea::  None    Diet::  Low salt and Carbohydrate counting    Frequency of exercise::  None    Taking medications regularly::  Yes    Medication side effects::  None    Additional concerns today::  No  Hypertension     Diet:  Low salt and Carbohydrate counting  Frequency of exercise:  None  Taking medications regularly:  Yes  Medication side effects:  None  Additional concerns today:  No          Hypertension Follow-up      Outpatient blood pressures are being checked at home.  Results are 128/70.    Low Salt Diet: low salt          Today's PHQ-2 Score:   PHQ-2 (  [employer]) 10/26/2017   Q1: Little interest or pleasure in doing things 0   Q2: Feeling down, depressed or hopeless 0   PHQ-2 Score 0   Q1: Little interest or pleasure in doing things Not at all   Q2: Feeling down, depressed or hopeless Not at all   PHQ-2 Score 0       Abuse: Current or Past(Physical, Sexual or Emotional)- No  Do you feel safe in your environment - Yes    Social History   Substance Use Topics     Smoking status: Never Smoker     Smokeless tobacco: Never Used     Alcohol use No     The patient does not drink >3 drinks per day nor >7 drinks per week.    Reviewed orders  with patient.  Reviewed health maintenance and updated orders accordingly - Yes  Patient Active Problem List   Diagnosis     Hypertension goal BP (blood pressure) < 140/90     Allergy to mold spores     House dust mite allergy     Allergic rhinitis due to animal dander     Need for desensitization to allergens     Diagnostic skin and sensitization tests     Hyperlipidemia LDL goal <100     Degenerative disc disease, lumbar     Lumbar disc herniation     History of DVT (deep vein thrombosis)     Mild persistent asthma without complication     Seronegative rheumatoid arthritis (H)     High risk medications (not anticoagulants) long-term use     Trigger finger, left middle finger     Allergic rhinitis due to pollen     Type 2 diabetes mellitus without complication, without long-term current use of insulin (H)     Seasonal allergic rhinitis, unspecified allergic rhinitis trigger     Morbid obesity due to excess calories (H)     Grief     Advanced directives, counseling/discussion     Past Surgical History:   Procedure Laterality Date     BACK SURGERY  2009     [facility]Y  1988    left foot     DISCECTOMY LUMBAR POSTERIOR MICROSCOPIC ONE LEVEL  3/18/2013    Procedure: DISCECTOMY LUMBAR POSTERIOR MICROSCOPIC ONE LEVEL;  Left Lumbar 4-5 Micro Discectomy;  Surgeon: Dallas Jensen MD;  Location: [facility]     TONSILLECTOMY & ADENOIDECTOMY  age 3       Social History   Substance Use Topics     Smoking status: Never Smoker     Smokeless tobacco: Never Used     Alcohol use No     Family History   Problem Relation Age of Onset     Cardiovascular Sister      atrial fibrillation     CEREBROVASCULAR DISEASE Brother      carotid stenosis     CANCER Father      lung     DIABETES Mother      Breast Cancer Maternal Grandmother      also cousin on this side     Breast Cancer Paternal Aunt      C.A.D. No family hx of      Autoimmune Disease No family hx of          Current Outpatient Prescriptions   Medication  Sig Dispense Refill     losartan (COZAAR) 100 MG tablet TAKE 1 TABLET (100 MG) BY MOUTH DAILY 90 tablet 3     metFORMIN (GLUCOPHAGE) 1000 MG tablet Take 1 tablet (1,000 mg) by mouth 2 times daily (with meals) 180 tablet 3     hydrALAZINE (APRESOLINE) 100 MG TABS tablet Take 1 tablet (100 mg) by mouth 2 times daily 180 tablet 0     hydrALAZINE (APRESOLINE) 50 MG tablet TAKE 1 TABLET(50 MG) BY MOUTH TWICE DAILY 30 tablet 0     order for DME Class I Jobst compression stockings 1 each 1     fluticasone (FLOVENT HFA) 110 MCG/ACT Inhaler Inhale 1 puff into the lungs daily 1 Inhaler 3     hydrOXYzine (ATARAX) 25 MG tablet TAKE 1 TO 2 TABLETS BY MOUTH EVERY NIGHT AT BEDTIME AS NEEDED FOR ITCH 60 tablet 11     cetirizine (ZYRTEC ALLERGY) 10 MG tablet Take 1-2 tablets (10-20 mg) by mouth daily as needed for allergies 60 tablet 11     simvastatin (ZOCOR) 10 MG tablet Take 1 tablet (10 mg) by mouth At Bedtime 90 tablet 3     hydroxychloroquine (PLAQUENIL) 200 MG tablet Take 2 tablets (400 mg) by mouth daily 180 tablet 3     folic acid (FOLVITE) 1 MG tablet Take 1 tablet (1 mg) by mouth daily 100 tablet 3     methotrexate sodium 2.5 MG TABS Take 20 mg by mouth once a week . Take all 8 tablets on the same day of each week. 96 tablet 1     aspirin 81 MG tablet Take 1 tablet (81 mg) by mouth daily 30 tablet      blood glucose monitoring (NO BRAND SPECIFIED) test strip Use to test blood sugar 1 times daily or as directed.  One touch ultra test strips 3 Month 3     tacrolimus (PROTOPIC) 0.1 % ointment Apply to the face daily, already failed desonide 60 g 0     triamcinolone (NASACORT) 55 MCG/ACT nasal inhaler Spray 2 sprays into both nostrils daily       levalbuterol (XOPENEX HFA) 45 MCG/ACT inhaler Inhale 2 puffs into the lungs every 4 hours as needed 1 Inhaler 3     CRANBERRY        FISH OIL        Cholecalciferol (VITAMIN D PO) Take  by mouth.       Ascorbic Acid (VITAMIN C PO) Take  by mouth.       Multiple Vitamin (DAILY  MULTIVITAMIN PO) Take  by mouth.       Calcium Carbonate-Vit D-Min (CALCIUM 1200 PO) Take  by mouth.       ibuprofen 200 MG capsule Take 200 mg by mouth every 4 hours as needed.       [DISCONTINUED] amLODIPine (NORVASC) 5 MG tablet Take 1 tablet (5 mg) by mouth daily 90 tablet 1     Allergies   Allergen Reactions     Aleve [Naproxen Sodium] Hives     Nexium [Esomeprazole Magnesium Trihydrate] Hives     HIVES     Shellfish Allergy Nausea     Sulfa Drugs      Stomach upset     Amlodipine Besylate Rash     Hctz Rash     Lisinopril Rash         Patient over age 50, mutual decision to screen reflected in health maintenance.      Pertinent mammograms are reviewed under the imaging tab.  History of abnormal Pap smear: NO - age 30-65 PAP every 5 years with negative HPV co-testing recommended    Reviewed and updated as needed this visit by clinical staffTobacco  Allergies  Meds  Problems  Med Hx  Surg Hx  Fam Hx  Soc Hx          Reviewed and updated as needed this visit by Provider  Allergies  Meds  Problems  Surg Hx  Fam Hx        Past Medical History:   Diagnosis Date     Allergic rhinitis due to animal dander      Allergy to mold spores     7/03 skin tests per [facility]: pos. for cat/dog/CR/DM/M/T/G/W     Diabetes (H)      Diagnostic skin and sensitization tests 7/03 skin tests per [facility]: pos. for cat/dog/CR/DM/M/T/G/W     DVT (deep venous thrombosis) (H)      Eczema     sees [facility]     GERD (gastroesophageal reflux disease)      High cholesterol     occ.     House dust mite allergy      HTN (hypertension)      Indigestion     uses OTC Zantac prn     Mild persistent asthma      Morbid obesity due to excess calories (H)      Need for desensitization to allergens 9/03 started at [facility]. for: cat/dog/DM/M/T/G/W    start IT at [facility]: about 1/12      PONV (postoperative nausea and vomiting)      RA (rheumatoid arthritis) (H)      Seasonal allergic rhinitis         Review of Systems  C: NEGATIVE for fever, chills,  "change in weight  I: NEGATIVE for worrisome rashes, moles or lesions  E: NEGATIVE for vision changes or irritation  ENT: NEGATIVE for ear, mouth and throat problems  R: NEGATIVE for significant cough or SOB  B: NEGATIVE for masses, tenderness or discharge  CV: NEGATIVE for chest pain, palpitations or peripheral edema  GI: NEGATIVE for nausea, abdominal pain, heartburn, or change in bowel habits  : NEGATIVE for unusual urinary or vaginal symptoms. No vaginal bleeding.  M: NEGATIVE for significant arthralgias or myalgia  N: NEGATIVE for weakness, dizziness or paresthesias  ENDOCRINE: diabetes   H: NEGATIVE for bleeding problems  PSYCHIATRIC: grief       OBJECTIVE:   /70  Pulse 98  Temp 97.6  F (36.4  C) (Oral)  Ht 5' 5.98\" (1.676 m)  Wt 232 lb 12.8 oz (105.6 kg)  LMP 03/04/2011  SpO2 100%  BMI 37.59 kg/m2  Physical Exam  GENERAL: alert and no distress  EYES: Eyes grossly normal to inspection, PERRL and conjunctivae and sclerae normal  HENT: ear canals and TM's normal, nose and mouth without ulcers or lesions  NECK: no adenopathy, no asymmetry, masses, or scars and thyroid normal to palpation  RESP: lungs clear to auscultation - no rales, rhonchi or wheezes  BREAST: normal without masses, tenderness or nipple discharge and no palpable axillary masses or adenopathy  CV: regular rate and rhythm, normal S1 S2, no S3 or S4, no murmur, click or rub, no peripheral edema    ABDOMEN: soft, nontender, no hepatosplenomegaly, no masses and bowel sounds normal   (female): normal female external genitalia, normal urethral meatus, vaginal mucosa pink, moist, well rugated, and normal cervix/adnexa/uterus without masses or discharge  MS: no gross musculoskeletal defects noted, no edema  SKIN: no suspicious lesions or rashes  NEURO: Normal strength and tone, mentation intact and speech normal  PSYCH: mentation appears normal, affect normal/bright    ASSESSMENT/PLAN:   (Z00.00) Routine history and physical examination " of adult  (primary encounter diagnosis)  Plan: Pap imaged thin layer screen with HPV -         recommended age 30 - 65 years (select HPV order        below), HPV High Risk Types DNA Cervical, Fecal        colorectal cancer screen (FIT), Lipid panel         reflex to direct LDL Fasting, *MA Screening         Digital Bilateral          (M06.00) Seronegative rheumatoid arthritis (H)  Plan: follow-up with rheumatology as planned     (E11.9) Type 2 diabetes mellitus without complication, without long-term current use of insulin (H)  (E66.01) Morbid obesity due to excess calories (H)  Comment: Well controlled with medications without side effects.   Plan: Lipid panel reflex to direct LDL Fasting          (J45.30) Mild persistent asthma without complication  Comment: Well controlled with medications without side effects.   Plan: ACT q 6 months      (I10) Essential hypertension with goal blood pressure less than 140/90  Comment: uncontrolled   Plan: losartan (COZAAR) 100 MG tablet, Albumin Random        Urine Quantitative with Creat Ratio,         hydrALAZINE (APRESOLINE) 100 MG TABS tablet        Increase dose of hydralazine; Follow up ancillary blood pressure recheck in one month or sooner for worsening of symptoms or side effects.      (D64.9) Normochromic anemia  Plan: Vitamin B12, Iron and iron binding capacity,         Ferritin, Folate             COUNSELING:  Reviewed preventive health counseling, as reflected in patient instructions  Special attention given to:        Regular exercise       Healthy diet/nutrition       Vision screening       Aspirin Prophylaxsis       Osteoporosis Prevention/Bone Health       Colon cancer screening       Consider Hep C screening for patients born between 1945 and 1965       The 10-year ASCVD risk score (Cris KULKARNI Jr, et al., 2013) is: 7.2%    Values used to calculate the score:      Age: 57 years      Sex: Female      Is Non- : No      Diabetic: Yes       "Tobacco smoker: No      Systolic Blood Pressure: 152 mmHg      Is BP treated: Yes      HDL Cholesterol: 70 mg/dL      Total Cholesterol: 153 mg/dL       Advance Care Planning           reports that she has never smoked. She has never used smokeless tobacco.    Estimated body mass index is 37.59 kg/(m^2) as calculated from the following:    Height as of this encounter: 5' 5.98\" (1.676 m).    Weight as of this encounter: 232 lb 12.8 oz (105.6 kg).   Weight management plan: Discussed healthy diet and exercise guidelines and patient will follow up in 6 months in clinic to re-evaluate.    Counseling Resources:  ATP IV Guidelines  Pooled Cohorts Equation Calculator  Breast Cancer Risk Calculator  FRAX Risk Assessment  ICSI Preventive Guidelines  Dietary Guidelines for Americans, 2010  USDA's MyPlate  ASA Prophylaxis  Lung CA Screening    Paula Alarcon MD  [facility] [place]  "

## 2017-10-26 NOTE — MR AVS SNAPSHOT
After Visit Summary   10/26/2017    Jorge Abarca    MRN: [id number]           Patient Information     Date Of Birth          1960        Visit Information        Provider Department      10/26/2017 2:20 PM Paula Alarcon MD [facility]        Today's Diagnoses     Routine history and physical examination of adult    -  1    Seronegative rheumatoid arthritis (H)        Type 2 diabetes mellitus without complication, without long-term current use of insulin (H)        Morbid obesity due to excess calories (H)        Mild persistent asthma without complication        Essential hypertension with goal blood pressure less than 140/90        Normochromic anemia        Screen for colon cancer        Advanced directives, counseling/discussion        Visit for screening mammogram          Care Instructions    Did you know you can lower your blood pressure with your daily habits?    *Losing 20 pounds of weight lowers blood pressure 5 to 20 points.  *Eating a low-fat diet rich in fruits, vegetables and low-fat dairy lowers blood pressure 8 to 14 points.  *Eating a low-salt diet lowers blood pressure 2 to 8 points.  *Exercising regularly lowers blood pressure 4 to 9 points.  *Reducing alcohol use lowers blood pressure 2 to 4 points.      [facility]    If you have any questions regarding to your visit please contact your care team:       Team Red:   Clinic Hours Telephone Number   Dr. Paula Frederick  (pediatrics)  Cheryl Gautam NP 7am-7pm  Monday - Thursday   7am-5pm  Fridays  [phone] (fax)    Vishnu ARREOLA  [phone]   Urgent Care - [place] and [address] Monday-Friday  [place] - 11am-8pm  Saturday-Sunday  Both sites - 9am-5pm  [phone] - [place]   [phone] - [address]       What options do I have for visits at the clinic other than the traditional office visit?  To expand how we care for you, many of  our providers are utilizing electronic visits (e-visits) and telephone visits, when medically appropriate, for interactions with their patients rather than a visit in the clinic.   We also offer nurse visits for many medical concerns. Just like any other service, we will bill your insurance company for this type of visit based on time spent on the phone with your provider. Not all insurance companies cover these visits. Please check with your medical insurance if this type of visit is covered. You will be responsible for any charges that are not paid by your insurance.      E-visits via [employer]:  generally incur a $35.00 fee.  Telephone visits:  Time spent on the phone: *charged based on time that is spent on the phone in increments of 10 minutes. Estimated cost:   5-10 mins $30.00   11-20 mins. $59.00   21-30 mins. $85.00     As always, Thank you for trusting us with your health care needs!              Preventive Health Recommendations  Female Ages 50 - 64    Yearly exam: See your health care provider every year in order to  o Review health changes.   o Discuss preventive care.    o Review your medicines if your doctor has prescribed any.      Get a Pap test every three years (unless you have an abnormal result and your provider advises testing more often).    If you get Pap tests with HPV test, you only need to test every 5 years, unless you have an abnormal result.     You do not need a Pap test if your uterus was removed (hysterectomy) and you have not had cancer.    You should be tested each year for STDs (sexually transmitted diseases) if you're at risk.     Have a mammogram every 1 to 2 years.    Have a colonoscopy at age 50, or have a yearly FIT test (stool test). These exams screen for colon cancer.      Have a cholesterol test every 5 years, or more often if advised.    Have a diabetes test (fasting glucose) every three years. If you are at risk for diabetes, you should have this test more often.     If  you are at risk for osteoporosis (brittle bone disease), think about having a bone density scan (DEXA).    Shots: Get a flu shot each year. Get a tetanus shot every 10 years.    Nutrition:     Eat at least 5 servings of fruits and vegetables each day.    Eat whole-grain bread, whole-wheat pasta and brown rice instead of white grains and rice.    Talk to your provider about Calcium and Vitamin D.     Lifestyle    Exercise at least 150 minutes a week (30 minutes a day, 5 days a week). This will help you control your weight and prevent disease.    Limit alcohol to one drink per day.    No smoking.     Wear sunscreen to prevent skin cancer.     See your dentist every six months for an exam and cleaning.    See your eye doctor every 1 to 2 years.  Discharged by Robyn GARCIA CMA ([facility])            Follow-ups after your visit        Follow-up notes from your care team     Return in about 1 month (around 11/26/2017) for free pharmacy blood pressure check (walk-in).      Your next 10 appointments already scheduled     Nov 02, 2017  2:00 PM CDT   [facility] Asim Aguila DPM   [facility]    [address] [place] [id number]   [phone]            Dec 21, 2017  3:30 PM CST   LAB with [facility]   [facility]    [address] [place] [id number]   [phone]           Patient must bring picture ID. Patient should be prepared to give a urine specimen  Please do not eat 10-12 hours before your appointment if you are coming in fasting for labs on lipids, cholesterol, or glucose (sugar). Pregnant women should follow their Care Team instructions. Water with medications is okay. Do not drink coffee or other fluids. If you have concerns about taking  your medications, please ask at office or if scheduling via [employer], send a message by clicking on Secure Messaging, Message Your Care Team.            Jan 03, 2018  3:20 PM CST    Return Visit with Chris Capellan MD   [facility] [place] ([facility])    [address]  [place] [address] [phone]              Future tests that were ordered for you today     Open Future Orders        Priority Expected Expires Ordered    TSH WITH FREE T4 REFLEX Routine  10/26/2018 10/26/2017    Fecal colorectal cancer screen (FIT) Routine 11/16/2017 1/18/2018 10/26/2017    Lipid panel reflex to direct LDL Fasting Routine  10/26/2018 10/26/2017    Albumin Random Urine Quantitative with Creat Ratio Routine  10/26/2018 10/26/2017    Hemoglobin A1c Routine  10/26/2018 10/26/2017    *MA Screening Digital Bilateral Routine  10/26/2018 10/26/2017    Vitamin B12 Routine  10/26/2018 10/26/2017    Iron and iron binding capacity Routine  10/26/2018 10/26/2017    Ferritin Routine  10/26/2018 10/26/2017    Folate Routine  10/26/2018 10/26/2017            Who to contact     If you have questions or need follow up information about today's clinic visit or your schedule please contact [facility] [place] directly at [phone].  Normal or non-critical lab and imaging results will be communicated to you by MyChart, letter or phone within 4 business days after the clinic has received the results. If you do not hear from us within 7 days, please contact the clinic through [employer] or phone. If you have a critical or abnormal lab result, we will notify you by phone as soon as possible.  Submit refill requests through [employer] or call your pharmacy and they will forward the refill request to us. Please allow 3 business days for your refill to be completed.          Additional Information About Your Visit        [employer] Information     [employer] gives you secure access to your electronic health record. If you see a primary care provider, you can also send messages to your care team and make appointments. If you have questions, please call your primary care clinic.  If you do not have a primary  "care provider, please call [phone] and they will assist you.        Care EveryWhere ID     This is your Care EveryWhere ID. This could be used by other organizations to access your [place] medical records  [id number]        Your Vitals Were     Pulse Temperature Height Last Period Pulse Oximetry BMI (Body Mass Index)    98 97.6  F (36.4  C) (Oral) 5' 5.98\" (1.676 m) 03/04/2011 100% 37.59 kg/m2       Blood Pressure from Last 3 Encounters:   10/26/17 152/70   10/19/17 128/84   09/15/17 152/74    Weight from Last 3 Encounters:   10/26/17 232 lb 12.8 oz (105.6 kg)   10/19/17 233 lb (105.7 kg)   09/15/17 230 lb (104.3 kg)              We Performed the Following     HPV High Risk Types DNA Cervical     Pap imaged thin layer screen with HPV - recommended age 30 - 65 years (select HPV order below)          Today's Medication Changes          These changes are accurate as of: 10/26/17  3:14 PM.  If you have any questions, ask your nurse or doctor.               These medicines have changed or have updated prescriptions.        Dose/Directions    * hydrALAZINE 50 MG tablet   Commonly known as:  APRESOLINE   This may have changed:  Another medication with the same name was added. Make sure you understand how and when to take each.   Used for:  Essential hypertension with goal blood pressure less than 140/90   Changed by:  Paula Alarcon MD        TAKE 1 TABLET(50 MG) BY MOUTH TWICE DAILY   Quantity:  30 tablet   Refills:  0       * hydrALAZINE 100 MG Tabs tablet   Commonly known as:  APRESOLINE   This may have changed:  You were already taking a medication with the same name, and this prescription was added. Make sure you understand how and when to take each.   Used for:  Essential hypertension with goal blood pressure less than 140/90   Changed by:  Paula Alarcon MD        Dose:  100 mg   Take 1 tablet (100 mg) by mouth 2 times daily   Quantity:  180 tablet   Refills:  0       * Notice:  This list has 2 " medication(s) that are the same as other medications prescribed for you. Read the directions carefully, and ask your doctor or other care provider to review them with you.         Where to get your medicines      These medications were sent to [id number] - [place] - [address] SEC [address] [facility] [address]  [address], [place] [address]    Hours:  Test fax successful 12/11/02   Phone:  [phone]     hydrALAZINE 100 MG Tabs tablet    losartan 100 MG tablet    metFORMIN 1000 MG tablet                Primary Care Provider Office Phone # Fax #    Paula Alarcon [phone] [address] [address]        Equal Access to Services     KEN AVALOS : Hadii aad ku hadasho Soomaali, waaxda luqadaha, qaybta kaalmada adeegyada, waxay chadwickin nuhan amina granados . So [facility] [phone].    ATENCIÓN: Si habla español, tiene a murphy disposición servicios gratuitos de asistencia lingüística. [facility] [phone].    We comply with applicable federal civil rights laws and [place] laws. We do not discriminate on the basis of race, color, national origin, age, disability, sex, sexual orientation, or gender identity.            Thank you!     Thank you for choosing [facility]  for your care. Our goal is always to provide you with excellent care. Hearing back from our patients is one way we can continue to improve our services. Please take a few minutes to complete the written survey that you may receive in the mail after your visit with us. Thank you!             Your Updated Medication List - Protect others around you: Learn how to safely use, store and throw away your medicines at www.disposemymeds.org.          This list is accurate as of: 10/26/17  3:14 PM.  Always use your most recent med list.                   Brand Name Dispense Instructions for use Diagnosis    aspirin 81 MG tablet     30 tablet    Take 1 tablet (81 mg)  by mouth daily    Type 2 diabetes mellitus without complication, without long-term current use of insulin (H)       blood glucose monitoring test strip    no brand specified    3 Month    Use to test blood sugar 1 times daily or as directed. One touch ultra test strips    DM type 2, goal A1c below 7       CALCIUM 1200 PO      Take  by mouth.        cetirizine 10 MG tablet    ZYRTEC ALLERGY    60 tablet    Take 1-2 tablets (10-20 mg) by mouth daily as needed for allergies    Itchy skin       CRANBERRY           DAILY MULTIVITAMIN PO      Take  by mouth.        FISH OIL           fluticasone 110 MCG/ACT Inhaler    FLOVENT HFA    1 Inhaler    Inhale 1 puff into the lungs daily    Mild persistent asthma without complication       folic acid 1 MG tablet    FOLVITE    100 tablet    Take 1 tablet (1 mg) by mouth daily    Seronegative rheumatoid arthritis (H)       * hydrALAZINE 50 MG tablet    APRESOLINE    30 tablet    TAKE 1 TABLET(50 MG) BY MOUTH TWICE DAILY    Essential hypertension with goal blood pressure less than 140/90       * hydrALAZINE 100 MG Tabs tablet    APRESOLINE    180 tablet    Take 1 tablet (100 mg) by mouth 2 times daily    Essential hypertension with goal blood pressure less than 140/90       hydroxychloroquine 200 MG tablet    PLAQUENIL    180 tablet    Take 2 tablets (400 mg) by mouth daily    Seronegative rheumatoid arthritis (H)       hydrOXYzine 25 MG tablet    ATARAX    60 tablet    TAKE 1 TO 2 TABLETS BY MOUTH EVERY NIGHT AT BEDTIME AS NEEDED FOR ITCH    Itchy skin       ibuprofen 200 MG capsule      Take 200 mg by mouth every 4 hours as needed.        levalbuterol 45 MCG/ACT Inhaler    XOPENEX HFA    1 Inhaler    Inhale 2 puffs into the lungs every 4 hours as needed    Mild persistent asthma, uncomplicated       losartan 100 MG tablet    COZAAR    90 tablet    TAKE 1 TABLET (100 MG) BY MOUTH DAILY    Essential hypertension with goal blood pressure less than 140/90       metFORMIN 1000 MG  tablet    GLUCOPHAGE    180 tablet    Take 1 tablet (1,000 mg) by mouth 2 times daily (with meals)    Type 2 diabetes mellitus without complication, without long-term current use of insulin (H)       methotrexate sodium 2.5 MG Tabs     96 tablet    Take 20 mg by mouth once a week . Take all 8 tablets on the same day of each week.    Seronegative rheumatoid arthritis (H)       order for DME     1 each    Class I Jobst compression stockings    Edema of both ankles       simvastatin 10 MG tablet    ZOCOR    90 tablet    Take 1 tablet (10 mg) by mouth At Bedtime    Hyperlipidemia LDL goal <100       tacrolimus 0.1 % ointment    PROTOPIC    60 g    Apply to the face daily, already failed desonide    AD (atopic dermatitis)       triamcinolone 55 MCG/ACT Inhaler    NASACORT     Spray 2 sprays into both nostrils daily    Seasonal allergic rhinitis       VITAMIN C PO      Take  by mouth.        VITAMIN D PO      Take  by mouth.        * Notice:  This list has 2 medication(s) that are the same as other medications prescribed for you. Read the directions carefully, and ask your doctor or other care provider to review them with you.

## 2017-10-31 LAB
COPATH REPORT: NORMAL
PAP: NORMAL

## 2017-11-01 LAB
FINAL DIAGNOSIS: NORMAL
HPV HR 12 DNA CVX QL NAA+PROBE: NEGATIVE
HPV16 DNA SPEC QL NAA+PROBE: NEGATIVE
HPV18 DNA SPEC QL NAA+PROBE: NEGATIVE
SPECIMEN DESCRIPTION: NORMAL

## 2017-11-02 ENCOUNTER — OFFICE VISIT (OUTPATIENT)
Dept: PODIATRY | Facility: CLINIC | Age: 57
End: 2017-11-02
Payer: OTHER MISCELLANEOUS

## 2017-11-02 VITALS — BODY MASS INDEX: 37.46 KG/M2 | HEART RATE: 98 BPM | OXYGEN SATURATION: 100 % | WEIGHT: 232 LBS

## 2017-11-02 DIAGNOSIS — S93.601A SPRAIN OF RIGHT FOOT, INITIAL ENCOUNTER: ICD-10-CM

## 2017-11-02 DIAGNOSIS — S92.355A CLOSED NONDISPLACED FRACTURE OF FIFTH METATARSAL BONE OF LEFT FOOT, INITIAL ENCOUNTER: Primary | ICD-10-CM

## 2017-11-02 DIAGNOSIS — S93.602A SPRAIN OF LEFT FOOT, INITIAL ENCOUNTER: ICD-10-CM

## 2017-11-02 PROCEDURE — 99204 OFFICE O/P NEW MOD 45 MIN: CPT | Performed by: PODIATRIST

## 2017-11-02 NOTE — NURSING NOTE
"Chief Complaint   Patient presents with     Follow Up For     Foot Pain     right foot     Ankle Pain     left      Work Comp     9/2017       Initial Pulse 98  Wt 105.2 kg (232 lb)  LMP 03/04/2011  SpO2 100%  BMI 37.46 kg/m2 Estimated body mass index is 37.46 kg/(m^2) as calculated from the following:    Height as of 10/26/17: 1.676 m (5' 5.98\").    Weight as of this encounter: 105.2 kg (232 lb).  Medication Reconciliation: complete  "

## 2017-11-02 NOTE — LETTER
11/2/2017         RE: Jorge Abarca  [address] [place] [address]  [place] [address]        Dear Colleague,    Thank you for referring your patient, Jorge Abarca, to the [facility]. Please see a copy of my visit note below.    See dictation      Again, thank you for allowing me to participate in the care of your patient.        Sincerely,        Asim Aguila DPM

## 2017-11-02 NOTE — LETTER
[address]  [place] [address]  Phone: [phone]    November 2, 2017        Jorge Abarca  [address] [facility]  [place] [address]          To whom it may concern:    RE: Jorge Abarca    Patient was seen and treated today at our clinic and missed work.  Patient may return to work  with the following:  Seated work only for 2 weeks  When the patient returns to work, the following restrictions apply until 11/2/-11/16/17:      Please contact me for questions or concerns.      Sincerely,        Dr. Asim SANTOSPKARRIE FAC FAS/lld

## 2017-11-02 NOTE — PATIENT INSTRUCTIONS
We wish you continued good healing. If you have any questions or concerns, please do not hesitate to contact us at [phone]      Please remember to call and schedule a follow up appointment if one was recommended at your earliest convenience.   PODIATRY CLINIC HOURS  TELEPHONE NUMBER    Dr. Asim Aguila D.P.[facility]    Clinics:  [facility]        Elsa Devries MA  Medical Assistant  Tuesday 1PM-6PM  [facility]  Wednesday 7AM-2PM  [place]  Thursday 10AM-6PM  [place] Friday 7AM-345PM  [place]  Specialty schedulers:   [phone] to make an appointment with any Specialty Provider.        Urgent Care locations:    [facility] Monday-Friday 5 pm - 9 pm. Saturday-Sunday 9 am -5pm    Monday-Friday 11 am - 9 pm Saturday 9 am - 5 pm     Monday-Sunday 12 noon-8PM [phone]     If you need a medication refill, please contact us you may need lab work and/or a follow up visit prior to your refill (i.e. Antifungal medications).    [employer] (secure e-mail communication and access to your chart) to send a message or to make an appointment.    If MRI needed please call Jake Hudson at [phone]        Weight management plan: Patient was referred to their PCP to discuss a diet and exercise plan.

## 2017-11-02 NOTE — MR AVS SNAPSHOT
After Visit Summary   11/2/2017    Jorge Abarca    MRN: [id number]           Patient Information     Date Of Birth          1960        Visit Information        Provider Department      11/2/2017 2:00 PM Asim Aguila, MELISSA [facility]        Today's Diagnoses     Closed nondisplaced fracture of fifth metatarsal bone of left foot, initial encounter    -  1    Sprain of right foot, initial encounter        Sprain of left foot, initial encounter          Care Instructions    We wish you continued good healing. If you have any questions or concerns, please do not hesitate to contact us at [phone]      Please remember to call and schedule a follow up appointment if one was recommended at your earliest convenience.   PODIATRY CLINIC HOURS  TELEPHONE NUMBER    Dr. Asim SANTOSPKARRIE [facility]    [facility]  [facility]        Elsa Devries MA  Medical Assistant  Tuesday 1PM-6PM  [place]  Wednesday 7AM-2PM  [place]  Thursday 10AM-6PM  [place]  Friday 7AM-345PM  [place]  Specialty schedulers:   [phone] to make an appointment with any Specialty Provider.        Urgent Care locations:    [facility] Monday-Friday 5 pm - 9 pm. Saturday-Sunday 9 am -5pm    Monday-Friday 11 am - 9 pm Saturday 9 am - 5 pm     Monday-Sunday 12 noon-8PM [phone]     If you need a medication refill, please contact us you may need lab work and/or a follow up visit prior to your refill (i.e. Antifungal medications).    [email or web address] (secure e-mail communication and access to your chart) to send a message or to make an appointment.    If MRI needed please call [place] at [phone]        Weight management plan: Patient was referred to their PCP to discuss a diet and exercise plan.            Follow-ups after your visit        Your next 10  appointments already scheduled     Nov 16, 2017  2:00 PM CST   MyChart Podiatry Return with Asim Aguila DPM   [facility] ([facility]    [address] [place] [address]  [place] [address] [phone]            Dec 21, 2017  3:30 PM CST   LAB with [facility]   [facility] ([facility])    [address]  [place] [address]   [phone]           Please do not eat 10-12 hours before your appointment if you are coming in fasting for labs on lipids, cholesterol, or glucose (sugar). This does not apply to pregnant women. Water, hot tea and black coffee (with nothing added) are okay. Do not drink other fluids, diet soda or chew gum.            Jan 03, 2018  3:20 PM CST   Return Visit with Chris Capellan MD   [facility] ([facility])    [address]  [place] [address] [phone]              Who to contact     If you have questions or need follow up information about today's clinic visit or your schedule please contact [facility] directly at [phone].  Normal or non-critical lab and imaging results will be communicated to you by Stupilhart, letter or phone within 4 business days after the clinic has received the results. If you do not hear from us within 7 days, please contact the clinic through Stupilhart or phone. If you have a critical or abnormal lab result, we will notify you by phone as soon as possible.  Submit refill requests through FiveCubits or call your pharmacy and they will forward the refill request to us. Please allow 3 business days for your refill to be completed.          Additional Information About Your Visit        FiveCubits Information     FiveCubits gives you secure access to your electronic health record. If you see a primary care provider, you can also send messages to your care team and make appointments. If you have questions, please call your primary care clinic.  If you do not have  a primary care provider, please call [phone] and they will assist you.        Care EveryWhere ID     This is your Care EveryWhere ID. This could be used by other organizations to access your [facility] medical records  [id number]        Your Vitals Were     Pulse Last Period Pulse Oximetry BMI (Body Mass Index)          98 03/04/2011 100% 37.46 kg/m2         Blood Pressure from Last 3 Encounters:   10/26/17 160/82   10/19/17 128/84   09/15/17 152/74    Weight from Last 3 Encounters:   11/02/17 105.2 kg (232 lb)   10/26/17 105.6 kg (232 lb 12.8 oz)   10/19/17 105.7 kg (233 lb)              We Performed the Following     CLOSED TX METATARSAL FX W/O MANIP, EACH        Primary Care Provider Office Phone # Fax #    Paula Alarcon [phone] [address] [place] [address]        Equal Access to Services     [facility]: Hadii aad ku hadasho Soomaali, waaxda luqadaha, qaybta kaalmada adeegyada, waxay idiin haymartirn amina granados . So [facility] [phone].    ATENCIÓN: Si habla español, tiene a murphy disposición servicios gratuitos de asistencia lingüística. Llame al [phone].    We comply with applicable federal civil rights laws and [place] laws. We do not discriminate on the basis of race, color, national origin, age, disability, sex, sexual orientation, or gender identity.            Thank you!     Thank you for choosing [facility]  for your care. Our goal is always to provide you with excellent care. Hearing back from our patients is one way we can continue to improve our services. Please take a few minutes to complete the written survey that you may receive in the mail after your visit with us. Thank you!             Your Updated Medication List - Protect others around you: Learn how to safely use, store and throw away your medicines at www.disposemymeds.org.          This list is accurate as of: 11/2/17 11:59 PM.  Always use your most recent med list.                    Brand Name Dispense Instructions for use Diagnosis    aspirin 81 MG tablet     30 tablet    Take 1 tablet (81 mg) by mouth daily    Type 2 diabetes mellitus without complication, without long-term current use of insulin (H)       blood glucose monitoring test strip    no brand specified    3 Month    Use to test blood sugar 1 times daily or as directed. One touch ultra test strips    DM type 2, goal A1c below 7       CALCIUM 1200 PO      Take  by mouth.        cetirizine 10 MG tablet    ZYRTEC ALLERGY    60 tablet    Take 1-2 tablets (10-20 mg) by mouth daily as needed for allergies    Itchy skin       CRANBERRY           DAILY MULTIVITAMIN PO      Take  by mouth.        FISH OIL           fluticasone 110 MCG/ACT Inhaler    FLOVENT HFA    1 Inhaler    Inhale 1 puff into the lungs daily    Mild persistent asthma without complication       folic acid 1 MG tablet    FOLVITE    100 tablet    Take 1 tablet (1 mg) by mouth daily    Seronegative rheumatoid arthritis (H)       * hydrALAZINE 50 MG tablet    APRESOLINE    30 tablet    TAKE 1 TABLET(50 MG) BY MOUTH TWICE DAILY    Essential hypertension with goal blood pressure less than 140/90       * hydrALAZINE 100 MG Tabs tablet    APRESOLINE    180 tablet    Take 1 tablet (100 mg) by mouth 2 times daily    Essential hypertension with goal blood pressure less than 140/90       hydroxychloroquine 200 MG tablet    PLAQUENIL    180 tablet    Take 2 tablets (400 mg) by mouth daily    Seronegative rheumatoid arthritis (H)       hydrOXYzine 25 MG tablet    ATARAX    60 tablet    TAKE 1 TO 2 TABLETS BY MOUTH EVERY NIGHT AT BEDTIME AS NEEDED FOR ITCH    Itchy skin       ibuprofen 200 MG capsule      Take 200 mg by mouth every 4 hours as needed.        levalbuterol 45 MCG/ACT Inhaler    XOPENEX HFA    1 Inhaler    Inhale 2 puffs into the lungs every 4 hours as needed    Mild persistent asthma, uncomplicated       losartan 100 MG tablet    COZAAR    90 tablet    TAKE 1  TABLET (100 MG) BY MOUTH DAILY    Essential hypertension with goal blood pressure less than 140/90       metFORMIN 1000 MG tablet    GLUCOPHAGE    180 tablet    Take 1 tablet (1,000 mg) by mouth 2 times daily (with meals)    Type 2 diabetes mellitus without complication, without long-term current use of insulin (H)       methotrexate sodium 2.5 MG Tabs     96 tablet    Take 20 mg by mouth once a week . Take all 8 tablets on the same day of each week.    Seronegative rheumatoid arthritis (H)       order for DME     1 each    Class I Jobst compression stockings    Edema of both ankles       simvastatin 10 MG tablet    ZOCOR    90 tablet    Take 1 tablet (10 mg) by mouth At Bedtime    Hyperlipidemia LDL goal <100       tacrolimus 0.1 % ointment    PROTOPIC    60 g    Apply to the face daily, already failed desonide    AD (atopic dermatitis)       triamcinolone 55 MCG/ACT Inhaler    NASACORT     Spray 2 sprays into both nostrils daily    Seasonal allergic rhinitis       VITAMIN C PO      Take  by mouth.        VITAMIN D PO      Take  by mouth.        * Notice:  This list has 2 medication(s) that are the same as other medications prescribed for you. Read the directions carefully, and ask your doctor or other care provider to review them with you.

## 2017-11-04 NOTE — PROGRESS NOTES
DATE OF VISIT:  11/02/2017.        Jorge Abarca was referred to me by Cheryl Gautam.  She complains of bilateral foot pain.  The left seems to be worse.  It is aggravated by activity and relieved by rest.  This started when she fell down two stairs at work on 09/07/2017.  She was seen in the clinic at that time and diagnosed with an ankle sprain and had work restrictions and ankle braces on RICE protocol.  She continued this with some improvement, especially on the right, however, the left still bothered her.  She points area of her styloid process.  She has tried to get back into her work shoes which are safety shoes, nonskid, however, she has pain on the outside portion of her left foot.  It is aggravated activity and relieved by rest.  She is here for reevaluation of this.  She gets some pain in her right foot as well but this is less.  She denies any past history of problems with her feet.  She has a history of diabetes and morbid obesity.      SOCIAL HISTORY:  She has never smoked.        REVIEW OF SYSTEMS:  A 10 point review of systems was performed and is positive for that noted in the HPI as seen above.  All other areas are negative.        Allergies   Allergen Reactions     Aleve [Naproxen Sodium] Hives     Hydralazine      Nexium [Esomeprazole Magnesium Trihydrate] Hives     HIVES     Shellfish Allergy Nausea     Sulfa Drugs      Stomach upset     Amlodipine Besylate Rash     Hctz Rash     Lisinopril Rash       Current Outpatient Prescriptions   Medication Sig Dispense Refill     losartan (COZAAR) 100 MG tablet TAKE 1 TABLET (100 MG) BY MOUTH DAILY 90 tablet 3     metFORMIN (GLUCOPHAGE) 1000 MG tablet Take 1 tablet (1,000 mg) by mouth 2 times daily (with meals) 180 tablet 3     order for DME Class I Jobst compression stockings 1 each 1     fluticasone (FLOVENT HFA) 110 MCG/ACT Inhaler Inhale 1 puff into the lungs daily 1 Inhaler 3     hydrOXYzine (ATARAX) 25 MG tablet TAKE 1 TO 2 TABLETS BY MOUTH EVERY  NIGHT AT BEDTIME AS NEEDED FOR ITCH 60 tablet 11     cetirizine (ZYRTEC ALLERGY) 10 MG tablet Take 1-2 tablets (10-20 mg) by mouth daily as needed for allergies 60 tablet 11     simvastatin (ZOCOR) 10 MG tablet Take 1 tablet (10 mg) by mouth At Bedtime 90 tablet 3     hydroxychloroquine (PLAQUENIL) 200 MG tablet Take 2 tablets (400 mg) by mouth daily 180 tablet 3     folic acid (FOLVITE) 1 MG tablet Take 1 tablet (1 mg) by mouth daily 100 tablet 3     methotrexate sodium 2.5 MG TABS Take 20 mg by mouth once a week . Take all 8 tablets on the same day of each week. 96 tablet 1     aspirin 81 MG tablet Take 1 tablet (81 mg) by mouth daily 30 tablet      blood glucose monitoring (NO BRAND SPECIFIED) test strip Use to test blood sugar 1 times daily or as directed.  One touch ultra test strips 3 Month 3     tacrolimus (PROTOPIC) 0.1 % ointment Apply to the face daily, already failed desonide 60 g 0     triamcinolone (NASACORT) 55 MCG/ACT nasal inhaler Spray 2 sprays into both nostrils daily       levalbuterol (XOPENEX HFA) 45 MCG/ACT inhaler Inhale 2 puffs into the lungs every 4 hours as needed 1 Inhaler 3     CRANBERRY        FISH OIL        Cholecalciferol (VITAMIN D PO) Take  by mouth.       Ascorbic Acid (VITAMIN C PO) Take  by mouth.       Multiple Vitamin (DAILY MULTIVITAMIN PO) Take  by mouth.       Calcium Carbonate-Vit D-Min (CALCIUM 1200 PO) Take  by mouth.       ibuprofen 200 MG capsule Take 200 mg by mouth every 4 hours as needed.       [DISCONTINUED] amLODIPine (NORVASC) 5 MG tablet Take 1 tablet (5 mg) by mouth daily 90 tablet 1       Patient Active Problem List   Diagnosis     Hypertension goal BP (blood pressure) < 140/90     Allergy to mold spores     House dust mite allergy     Allergic rhinitis due to animal dander     Need for desensitization to allergens     Diagnostic skin and sensitization tests     Hyperlipidemia LDL goal <100     Degenerative disc disease, lumbar     Lumbar disc herniation      History of DVT (deep vein thrombosis)     Mild persistent asthma without complication     Seronegative rheumatoid arthritis (H)     High risk medications (not anticoagulants) long-term use     Trigger finger, left middle finger     Allergic rhinitis due to pollen     Type 2 diabetes mellitus without complication, without long-term current use of insulin (H)     Seasonal allergic rhinitis, unspecified allergic rhinitis trigger     Morbid obesity due to excess calories (H)     Grief     Advanced directives, counseling/discussion       Past Medical History:   Diagnosis Date     Allergic rhinitis due to animal dander      Allergy to mold spores     7/03 skin tests per [facility]: pos. for cat/dog/CR/DM/M/T/G/W     Diabetes (H)      Diagnostic skin and sensitization tests 7/03 skin tests per [facility]: pos. for cat/dog/CR/DM/M/T/G/W     DVT (deep venous thrombosis) (H)      Eczema     sees [facility]     GERD (gastroesophageal reflux disease)      High cholesterol     occ.     House dust mite allergy      HTN (hypertension)      Indigestion     uses OTC Zantac prn     Mild persistent asthma      Morbid obesity due to excess calories (H)      Need for desensitization to allergens 9/03 started at [facility]. for: cat/dog/DM/M/T/G/W    start IT at [facility]: about 1/12      PONV (postoperative nausea and vomiting)      RA (rheumatoid arthritis) (H)      Seasonal allergic rhinitis        Past Surgical History:   Procedure Laterality Date     BACK SURGERY  2009     [facility] AFF SURGICAL PATHOLOGY  1988    left foot     DISCECTOMY LUMBAR POSTERIOR MICROSCOPIC ONE LEVEL  3/18/2013    Procedure: DISCECTOMY LUMBAR POSTERIOR MICROSCOPIC ONE LEVEL;  Left Lumbar 4-5 Micro Discectomy;  Surgeon: Dallas Jensen MD;  Location: [facility] OR     TONSILLECTOMY & ADENOIDECTOMY  age 3       Family History   Problem Relation Age of Onset     Cardiovascular Sister      atrial fibrillation     CEREBROVASCULAR DISEASE Brother      carotid stenosis     CANCER  Father      lung     DIABETES Mother      Breast Cancer Maternal Grandmother      also cousin on this side     Breast Cancer Paternal Aunt      AMITA.A.D. No family hx of      Autoimmune Disease No family hx of        Social History   Substance Use Topics     Smoking status: Never Smoker     Smokeless tobacco: Never Used     Alcohol use No          PHYSICAL EXAMINATION:     VITAL SIGNS:  She has a pulse of 98, her pO2 is 100%.  She weighs 232 pounds and has a BMI of 37.46.     CONSTITUTIONAL:   She is very pleasant to talk with today and in no apparent distress, appears well-nourished cooperative with history and physical exam.     PSYCHIATRIC:  The patient answered questions appropriately, normal affect, seems to have reasonable expectations in terms of treatment.     HEENT:  Eyes visual scanning, tracking, without deficit.  Ears, response to auditory stimuli is normal, no hearing aid devices, auricles proper alignment.     LYMPHATIC:  Popliteal lymph nodes not enlarged.     LUNGS:  Nonlabored breathing. nonlabored speech, no cough, no audible wheezing even quiet breathing.     VASCULAR:  She has palpable pedal pulses both the DP and PT.  Capillary refill less than 3 seconds in all digits.  Toes are warm to touch.  Slight ankle edema noted.  Slight ankle varicosities.  Positive hair growth bilaterally.     NEUROLOGIC:  Alert and oriented x3, coordinated gait.  Light touch is intact on L4-L5 S1 distributions, no obvious deficits.  No obvious neurological based weakness or spasticity or contracture of the lower extremities.     DERMATOLOGIC:  Skin is normal Texture and turgor.  There is some edema and ecchymosis on both feet.     MUSCULOSKELETAL:  On the left foot there is pain over the fourth and fifth tarsometatarsal joints and most notable on the left styloid process.  On the right there is some right lateral ankle edema and pain on palpation but much less than the contralateral side.  Muscle strength 4/5 in all  compartments.  No signs of tendon rupture.  X-rays show that on the left styloid process, there is a small nondisplaced fracture.  Otherwise, left foot exam was unremarkable.  The right foot x-rays are unremarkable.        ASSESSMENT:   1.  Right ankle sprain.   2.  Left ankle sprain.   3.  Left metatarsal avulsion fracture.      PLAN:  I personally reviewed the x-rays.  I discussed with the patient that she is still getting pain in this area because of an avulsion fracture.  It sounds as though this is getting slightly better.  We wrote her a note for work so that she will be off her foot for the next 2 weeks to give this bone more time to heal.  If this is feeling better then she can go back to work standing in a shoe on the  and hopefully this fracture will be healed by then.  We will have her RTC p.r.n.  Fracture care.        Thank you, Cheryl for allowing me to participate in the care of this patient.         PANKAJ PALMA DPM             D: 2017 15:17   T: 2017 00:05   MT:       Name:     NANCY DELCID   MRN:      [id number]        Account:      [id number]   :      1960           Visit Date:   2017      Document: [id number]

## 2017-11-07 ENCOUNTER — MYC MEDICAL ADVICE (OUTPATIENT)
Dept: FAMILY MEDICINE | Facility: CLINIC | Age: 57
End: 2017-11-07

## 2017-11-07 NOTE — TELEPHONE ENCOUNTER
Please call patient: take benadryl 25 - 50 mg q 6 hrs as needed and be seen ASAP for worsening or persistence of symptoms. Stop hydralazine due to suspected allergy. See me in clinic to discuss alternative medication such as metoprolol.   Paula Alarcon MD

## 2017-11-07 NOTE — TELEPHONE ENCOUNTER
Patient notified of providers message as written.  Patient verbalized understanding.  Patient would like to schedule the visit later.  She will stop the Hydralazine.   Sita Wilkerson RN

## 2017-11-07 NOTE — PATIENT INSTRUCTIONS
Did you know you can lower your blood pressure with your daily habits?    *Losing 20 pounds of weight lowers blood pressure 5 to 20 points.  *Eating a low-fat diet rich in fruits, vegetables and low-fat dairy lowers blood pressure 8 to 14 points.  *Eating a low-salt diet lowers blood pressure 2 to 8 points.  *Exercising regularly lowers blood pressure 4 to 9 points.  *Reducing alcohol use lowers blood pressure 2 to 4 points.      [facility]    If you have any questions regarding to your visit please contact your care team:       Team Red:   Clinic Hours Telephone Number   Dr. Paula Frederick  (pediatrics)  Cheryl Gautam NP 7am-7pm  Monday - Thursday   7am-5pm  Fridays  [phone] (fax)    Vishnu ARREOLA  [phone]   Urgent Care - [place] and [place] Monday-Friday  [place] - 11am-8pm  Saturday-Sunday  Both sites - 9am-5pm  [phone] - [facility]  [phone] - [place]       What options do I have for visits at the clinic other than the traditional office visit?  To expand how we care for you, many of our providers are utilizing electronic visits (e-visits) and telephone visits, when medically appropriate, for interactions with their patients rather than a visit in the clinic.   We also offer nurse visits for many medical concerns. Just like any other service, we will bill your insurance company for this type of visit based on time spent on the phone with your provider. Not all insurance companies cover these visits. Please check with your medical insurance if this type of visit is covered. You will be responsible for any charges that are not paid by your insurance.      E-visits via Nomios:  generally incur a $35.00 fee.  Telephone visits:  Time spent on the phone: *charged based on time that is spent on the phone in increments of 10 minutes. Estimated cost:   5-10 mins $30.00   11-20 mins. $59.00   21-30 mins. $85.00     As  always, Thank you for trusting us with your health care needs!            Discharged by ADOLPH Atkinson

## 2017-11-14 ENCOUNTER — OFFICE VISIT (OUTPATIENT)
Dept: FAMILY MEDICINE | Facility: CLINIC | Age: 57
End: 2017-11-14
Payer: COMMERCIAL

## 2017-11-14 VITALS
HEIGHT: 66 IN | SYSTOLIC BLOOD PRESSURE: 158 MMHG | RESPIRATION RATE: 16 BRPM | BODY MASS INDEX: 37.28 KG/M2 | WEIGHT: 232 LBS | TEMPERATURE: 97.7 F | DIASTOLIC BLOOD PRESSURE: 90 MMHG | OXYGEN SATURATION: 100 % | HEART RATE: 84 BPM

## 2017-11-14 DIAGNOSIS — E66.01 MORBID OBESITY DUE TO EXCESS CALORIES (H): ICD-10-CM

## 2017-11-14 DIAGNOSIS — D64.9 NORMOCYTIC ANEMIA: ICD-10-CM

## 2017-11-14 DIAGNOSIS — E11.9 TYPE 2 DIABETES MELLITUS WITHOUT COMPLICATION, WITHOUT LONG-TERM CURRENT USE OF INSULIN (H): ICD-10-CM

## 2017-11-14 DIAGNOSIS — I10 HYPERTENSION GOAL BP (BLOOD PRESSURE) < 140/90: Primary | ICD-10-CM

## 2017-11-14 DIAGNOSIS — E78.5 HYPERLIPIDEMIA LDL GOAL <100: ICD-10-CM

## 2017-11-14 PROCEDURE — 99214 OFFICE O/P EST MOD 30 MIN: CPT | Performed by: FAMILY MEDICINE

## 2017-11-14 RX ORDER — CLONIDINE HYDROCHLORIDE 0.1 MG/1
0.1 TABLET ORAL 2 TIMES DAILY
Qty: 60 TABLET | Refills: 0 | Status: CANCELLED | OUTPATIENT
Start: 2017-11-14

## 2017-11-14 RX ORDER — METOPROLOL SUCCINATE 25 MG/1
25 TABLET, EXTENDED RELEASE ORAL DAILY
Qty: 30 TABLET | Refills: 0 | Status: SHIPPED | OUTPATIENT
Start: 2017-11-14 | End: 2017-12-13

## 2017-11-14 ASSESSMENT — PAIN SCALES - GENERAL: PAINLEVEL: NO PAIN (0)

## 2017-11-14 NOTE — NURSING NOTE
"Chief Complaint   Patient presents with     Hypertension     Lipids       Initial /90  Pulse 84  Temp 97.7  F (36.5  C) (Oral)  Resp 16  Ht 5' 6\" (1.676 m)  Wt 232 lb (105.2 kg)  LMP 03/04/2011  SpO2 100%  Breastfeeding? No  BMI 37.45 kg/m2 Estimated body mass index is 37.45 kg/(m^2) as calculated from the following:    Height as of this encounter: 5' 6\" (1.676 m).    Weight as of this encounter: 232 lb (105.2 kg).  Medication Reconciliation: complete   Melisa Paris CMA (AAMA)      "

## 2017-11-14 NOTE — MR AVS SNAPSHOT
After Visit Summary   11/14/2017    Jorge Abarca    MRN: [id number]           Patient Information     Date Of Birth          1960        Visit Information        Provider Department      11/14/2017 2:20 PM Paula Alarcon MD [facility]        Today's Diagnoses     Hypertension goal BP (blood pressure) < 140/90    -  1    Morbid obesity due to excess calories (H)        Type 2 diabetes mellitus without complication, without long-term current use of insulin (H)        Hyperlipidemia LDL goal <100        Normocytic anemia          Care Instructions    Did you know you can lower your blood pressure with your daily habits?    *Losing 20 pounds of weight lowers blood pressure 5 to 20 points.  *Eating a low-fat diet rich in fruits, vegetables and low-fat dairy lowers blood pressure 8 to 14 points.  *Eating a low-salt diet lowers blood pressure 2 to 8 points.  *Exercising regularly lowers blood pressure 4 to 9 points.  *Reducing alcohol use lowers blood pressure 2 to 4 points.      [facility]    If you have any questions regarding to your visit please contact your care team:       Team Red:   Clinic Hours Telephone Number   Dr. Paula Frederick  (pediatrics)  Cheryl Gautam NP 7am-7pm  Monday - Thursday   7am-5pm  Fridays  [phone] (fax)    Vishnu ARREOLA  [phone]   Urgent Care - [place] and [place] Monday-Friday  [place] - 11am-8pm  Saturday-Sunday  Both sites - 9am-5pm  [phone] - [facility]  [phone] [facility]       What options do I have for visits at the clinic other than the traditional office visit?  To expand how we care for you, many of our providers are utilizing electronic visits (e-visits) and telephone visits, when medically appropriate, for interactions with their patients rather than a visit in the clinic.   We also offer nurse visits for many medical concerns. Just like any other  service, we will bill your insurance company for this type of visit based on time spent on the phone with your provider. Not all insurance companies cover these visits. Please check with your medical insurance if this type of visit is covered. You will be responsible for any charges that are not paid by your insurance.      E-visits via [employer]:  generally incur a $35.00 fee.  Telephone visits:  Time spent on the phone: *charged based on time that is spent on the phone in increments of 10 minutes. Estimated cost:   5-10 mins $30.00   11-20 mins. $59.00   21-30 mins. $85.00     As always, Thank you for trusting us with your health care needs!            Discharged by ADOLPH Atkinson            Follow-ups after your visit        Follow-up notes from your care team     Return in about 1 month (around 12/14/2017) for free nurse only blood pressure check, fasting lab only recheck.      Your next 10 appointments already scheduled     Nov 16, 2017  2:00 PM CST   MyChart Podiatry Return with Asim Aguila DPM   [facility] ([facility])    [address] [address]   [phone]            Jan 03, 2018  3:20 PM CST   Return Visit with Chris Capellan MD   [facility] ([address] [address]   [phone]              Future tests that were ordered for you today     Open Future Orders        Priority Expected Expires Ordered    Lipid panel reflex to direct LDL Fasting Routine  11/14/2018 11/14/2017    Albumin Random Urine Quantitative with Creat Ratio Routine  11/14/2018 11/14/2017    TSH WITH FREE T4 REFLEX Routine  11/14/2018 11/14/2017    Hemoglobin A1c Routine  11/14/2018 11/14/2017    Vitamin B12 Routine  11/14/2018 11/14/2017    Iron and iron binding capacity Routine  11/14/2018 11/14/2017    Ferritin Routine  11/14/2018 11/14/2017    Folate Routine  11/14/2018 11/14/2017            Who to contact     If you  "have questions or need follow up information about today's clinic visit or your schedule please contact [facility] [place] directly at [phone].  Normal or non-critical lab and imaging results will be communicated to you by Thar Geothermalhart, letter or phone within 4 business days after the clinic has received the results. If you do not hear from us within 7 days, please contact the clinic through [employer] or phone. If you have a critical or abnormal lab result, we will notify you by phone as soon as possible.  Submit refill requests through [employer] or call your pharmacy and they will forward the refill request to us. Please allow 3 business days for your refill to be completed.          Additional Information About Your Visit        [employer]ar [employer] Information     [employer] gives you secure access to your electronic health record. If you see a primary care provider, you can also send messages to your care team and make appointments. If you have questions, please call your primary care clinic.  If you do not have a primary care provider, please call [phone] and they will assist you.        Care EveryWhere ID     This is your Care EveryWhere ID. This could be used by other organizations to access your [place] medical records  [id number]        Your Vitals Were     Pulse Temperature Respirations Height Last Period Pulse Oximetry    84 97.7  F (36.5  C) (Oral) 16 5' 6\" (1.676 m) 03/04/2011 100%    Breastfeeding? BMI (Body Mass Index)                No 37.45 kg/m2           Blood Pressure from Last 3 Encounters:   11/14/17 158/90   10/26/17 160/82   10/19/17 128/84    Weight from Last 3 Encounters:   11/14/17 232 lb (105.2 kg)   11/02/17 232 lb (105.2 kg)   10/26/17 232 lb 12.8 oz (105.6 kg)                 Today's Medication Changes          These changes are accurate as of: 11/14/17  3:07 PM.  If you have any questions, ask your nurse or doctor.               Start taking these medicines.        Dose/Directions    " metoprolol 25 MG 24 hr tablet   Commonly known as:  TOPROL-XL   Used for:  Hypertension goal BP (blood pressure) < 140/90   Started by:  Paula Alarcon MD        Dose:  25 mg   Take 1 tablet (25 mg) by mouth daily   Quantity:  30 tablet   Refills:  0            Where to get your medicines      These medications were sent to [id number] - [place], [place] - [address]  [address], [place] [address]    Hours:  Test fax successful 12/11/02   Phone:  [phone]     metoprolol 25 MG 24 hr tablet                Primary Care Provider Office Phone # Fax #    Paula Alarcon [phone]       [address]  [place] [address]        Equal Access to Services     [facility]: Hadii isra rodas hadasho Soomaali, waaxda luqadaha, qaybta kaalmada adeegyada, kodi suarez haycarrol granados . So [facility] [phone].    ATENCIÓN: Si habla español, tiene a murphy disposición servicios gratuitos de asistencia lingüística. [facility] [phone].    We comply with applicable federal civil rights laws and [place] laws. We do not discriminate on the basis of race, color, national origin, age, disability, sex, sexual orientation, or gender identity.            Thank you!     Thank you for choosing [facility]  for your care. Our goal is always to provide you with excellent care. Hearing back from our patients is one way we can continue to improve our services. Please take a few minutes to complete the written survey that you may receive in the mail after your visit with us. Thank you!             Your Updated Medication List - Protect others around you: Learn how to safely use, store and throw away your medicines at www.disposemymeds.org.          This list is accurate as of: 11/14/17  3:07 PM.  Always use your most recent med list.                   Brand Name Dispense Instructions for use Diagnosis    aspirin 81 MG tablet     30  tablet    Take 1 tablet (81 mg) by mouth daily    Type 2 diabetes mellitus without complication, without long-term current use of insulin (H)       blood glucose monitoring test strip    no brand specified    3 Month    Use to test blood sugar 1 times daily or as directed. One touch ultra test strips    DM type 2, goal A1c below 7       CALCIUM 1200 PO      Take  by mouth.        cetirizine 10 MG tablet    ZYRTEC ALLERGY    60 tablet    Take 1-2 tablets (10-20 mg) by mouth daily as needed for allergies    Itchy skin       CRANBERRY           DAILY MULTIVITAMIN PO      Take  by mouth.        FISH OIL           fluticasone 110 MCG/ACT Inhaler    FLOVENT HFA    1 Inhaler    Inhale 1 puff into the lungs daily    Mild persistent asthma without complication       folic acid 1 MG tablet    FOLVITE    100 tablet    Take 1 tablet (1 mg) by mouth daily    Seronegative rheumatoid arthritis (H)       hydroxychloroquine 200 MG tablet    PLAQUENIL    180 tablet    Take 2 tablets (400 mg) by mouth daily    Seronegative rheumatoid arthritis (H)       hydrOXYzine 25 MG tablet    ATARAX    60 tablet    TAKE 1 TO 2 TABLETS BY MOUTH EVERY NIGHT AT BEDTIME AS NEEDED FOR ITCH    Itchy skin       ibuprofen 200 MG capsule      Take 200 mg by mouth every 4 hours as needed.        levalbuterol 45 MCG/ACT Inhaler    XOPENEX HFA    1 Inhaler    Inhale 2 puffs into the lungs every 4 hours as needed    Mild persistent asthma, uncomplicated       losartan 100 MG tablet    COZAAR    90 tablet    TAKE 1 TABLET (100 MG) BY MOUTH DAILY    Essential hypertension with goal blood pressure less than 140/90       metFORMIN 1000 MG tablet    GLUCOPHAGE    180 tablet    Take 1 tablet (1,000 mg) by mouth 2 times daily (with meals)    Type 2 diabetes mellitus without complication, without long-term current use of insulin (H)       methotrexate sodium 2.5 MG Tabs     96 tablet    Take 20 mg by mouth once a week . Take all 8 tablets on the same day of each  week.    Seronegative rheumatoid arthritis (H)       metoprolol 25 MG 24 hr tablet    TOPROL-XL    30 tablet    Take 1 tablet (25 mg) by mouth daily    Hypertension goal BP (blood pressure) < 140/90       order for DME     1 each    Class I Jobst compression stockings    Edema of both ankles       simvastatin 10 MG tablet    ZOCOR    90 tablet    Take 1 tablet (10 mg) by mouth At Bedtime    Hyperlipidemia LDL goal <100       tacrolimus 0.1 % ointment    PROTOPIC    60 g    Apply to the face daily, already failed desonide    AD (atopic dermatitis)       triamcinolone 55 MCG/ACT Inhaler    NASACORT     Spray 2 sprays into both nostrils daily    Seasonal allergic rhinitis       VITAMIN C PO      Take  by mouth.        VITAMIN D PO      Take  by mouth.

## 2017-11-16 ENCOUNTER — OFFICE VISIT (OUTPATIENT)
Dept: PODIATRY | Facility: CLINIC | Age: 57
End: 2017-11-16
Payer: OTHER MISCELLANEOUS

## 2017-11-16 VITALS — BODY MASS INDEX: 37.45 KG/M2 | HEART RATE: 88 BPM | WEIGHT: 232 LBS | OXYGEN SATURATION: 100 %

## 2017-11-16 DIAGNOSIS — S92.355A CLOSED NONDISPLACED FRACTURE OF FIFTH METATARSAL BONE OF LEFT FOOT, INITIAL ENCOUNTER: Primary | ICD-10-CM

## 2017-11-16 DIAGNOSIS — S93.602A SPRAIN OF LEFT FOOT, INITIAL ENCOUNTER: ICD-10-CM

## 2017-11-16 DIAGNOSIS — S93.601A SPRAIN OF RIGHT FOOT, INITIAL ENCOUNTER: ICD-10-CM

## 2017-11-16 PROCEDURE — 99213 OFFICE O/P EST LOW 20 MIN: CPT | Performed by: PODIATRIST

## 2017-11-16 NOTE — PATIENT INSTRUCTIONS
We wish you continued good healing. If you have any questions or concerns, please do not hesitate to contact us at [phone]      Please remember to call and schedule a follow up appointment if one was recommended at your earliest convenience.   PODIATRY CLINIC HOURS  TELEPHONE NUMBER    Dr. Asim Aguila D.P.M [facility]    Clinics:  [facility]        Elsa Devries MA  Medical Assistant  Tuesday 1PM-6PM  [facility]  Wednesday 7AM-2PM  [place]  Thursday 10AM-6PM  [place] Friday 7AM-345PM  [place]  Specialty schedulers:   [phone] to make an appointment with any Specialty Provider.        Urgent Care locations:    [facility] Monday-Friday 5 pm - 9 pm. Saturday-Sunday 9 am -5pm    Monday-Friday 11 am - 9 pm Saturday 9 am - 5 pm     Monday-Sunday 12 noon-8PM [phone]     If you need a medication refill, please contact us you may need lab work and/or a follow up visit prior to your refill (i.e. Antifungal medications).    [email or web address] (secure e-mail communication and access to your chart) to send a message or to make an appointment.    If MRI needed please call Jake [facility] at [phone]        Weight management plan: Patient was referred to their PCP to discuss a diet and exercise plan.

## 2017-11-16 NOTE — MR AVS SNAPSHOT
After Visit Summary   11/16/2017    Jorge Abarca    MRN: [id number]           Patient Information     Date Of Birth          1960        Visit Information        Provider Department      11/16/2017 2:00 PM Asim Aguila, [facility]        Today's Diagnoses     Closed nondisplaced fracture of fifth metatarsal bone of left foot, initial encounter    -  1    Sprain of right foot, initial encounter        Sprain of left foot, initial encounter          Care Instructions    We wish you continued good healing. If you have any questions or concerns, please do not hesitate to contact us at [phone]      Please remember to call and schedule a follow up appointment if one was recommended at your earliest convenience.   PODIATRY CLINIC HOURS  TELEPHONE NUMBER    Dr. Asim SANTOSPKARRIE [facility]    [facility]  [facility]        Elsa Devries MA  Medical Assistant  Tuesday 1PM-6PM  [place]  Wednesday 7AM-2PM  [place]  Thursday 10AM-6PM  [place]  Friday 7AM-345PM  [place]  Specialty schedulers:   [phone] to make an appointment with any Specialty Provider.        Urgent Care locations:    [facility] Monday-Friday 5 pm - 9 pm. Saturday-Sunday 9 am -5pm    Monday-Friday 11 am - 9 pm Saturday 9 am - 5 pm     Monday-Sunday 12 noon-8PM [phone]     If you need a medication refill, please contact us you may need lab work and/or a follow up visit prior to your refill (i.e. Antifungal medications).    [employer] (secure e-mail communication and access to your chart) to send a message or to make an appointment.    If MRI needed please call Jake Hudson at [phone]        Weight management plan: Patient was referred to their PCP to discuss a diet and exercise plan.              Follow-ups after your visit        Your next 10  appointments already scheduled     Jan 03, 2018  3:20 PM CST   Return Visit with Chris Capellan MD   [facility] ([facility])    [address]  [place] [address] [phone]              Who to contact     If you have questions or need follow up information about today's clinic visit or your schedule please contact [facility] directly at [phone].  Normal or non-critical lab and imaging results will be communicated to you by HotGrindshart, letter or phone within 4 business days after the clinic has received the results. If you do not hear from us within 7 days, please contact the clinic through Gamer [employer] or phone. If you have a critical or abnormal lab result, we will notify you by phone as soon as possible.  Submit refill requests through [employer] or call your pharmacy and they will forward the refill request to us. Please allow 3 business days for your refill to be completed.          Additional Information About Your Visit        [employer] Information     [employer] gives you secure access to your electronic health record. If you see a primary care provider, you can also send messages to your care team and make appointments. If you have questions, please call your primary care clinic.  If you do not have a primary care provider, please call [phone] and they will assist you.        Care EveryWhere ID     This is your Care EveryWhere ID. This could be used by other organizations to access your [place] medical records  [id number]        Your Vitals Were     Pulse Last Period Pulse Oximetry BMI (Body Mass Index)          88 03/04/2011 100% 37.45 kg/m2         Blood Pressure from Last 3 Encounters:   11/14/17 158/90   10/26/17 160/82   10/19/17 128/84    Weight from Last 3 Encounters:   11/16/17 232 lb (105.2 kg)   11/14/17 232 lb (105.2 kg)   11/02/17 232 lb (105.2 kg)              Today, you had the following     No orders found for display       Primary Care  Provider Office Phone # Fax #    Paula Alarcon [phone] [address]  [facility] [address]        Equal Access to Services     PARASKEN ROBBIE : Hadkayce isra rodas angela Sosrikanth, waaxda luqadaha, qaybta kaalmada opal, kodi dean laMosescarrol denis. So [facility] [phone].    ATENCIÓN: Si habla español, tiene a murphy disposición servicios gratuitos de asistencia lingüística. Llame al [phone].    We comply with applicable federal civil rights laws and [place] laws. We do not discriminate on the basis of race, color, national origin, age, disability, sex, sexual orientation, or gender identity.            Thank you!     Thank you for choosing [facility]  for your care. Our goal is always to provide you with excellent care. Hearing back from our patients is one way we can continue to improve our services. Please take a few minutes to complete the written survey that you may receive in the mail after your visit with us. Thank you!             Your Updated Medication List - Protect others around you: Learn how to safely use, store and throw away your medicines at www.disposemymeds.org.          This list is accurate as of: 11/16/17  3:29 PM.  Always use your most recent med list.                   Brand Name Dispense Instructions for use Diagnosis    aspirin 81 MG tablet     30 tablet    Take 1 tablet (81 mg) by mouth daily    Type 2 diabetes mellitus without complication, without long-term current use of insulin (H)       blood glucose monitoring test strip    no brand specified    3 Month    Use to test blood sugar 1 times daily or as directed. One touch ultra test strips    DM type 2, goal A1c below 7       CALCIUM 1200 PO      Take  by mouth.        cetirizine 10 MG tablet    ZYRTEC ALLERGY    60 tablet    Take 1-2 tablets (10-20 mg) by mouth daily as needed for allergies    Itchy skin       CRANBERRY           DAILY MULTIVITAMIN PO      Take  by mouth.         FISH OIL           fluticasone 110 MCG/ACT Inhaler    FLOVENT HFA    1 Inhaler    Inhale 1 puff into the lungs daily    Mild persistent asthma without complication       folic acid 1 MG tablet    FOLVITE    100 tablet    Take 1 tablet (1 mg) by mouth daily    Seronegative rheumatoid arthritis (H)       hydroxychloroquine 200 MG tablet    PLAQUENIL    180 tablet    Take 2 tablets (400 mg) by mouth daily    Seronegative rheumatoid arthritis (H)       hydrOXYzine 25 MG tablet    ATARAX    60 tablet    TAKE 1 TO 2 TABLETS BY MOUTH EVERY NIGHT AT BEDTIME AS NEEDED FOR ITCH    Itchy skin       ibuprofen 200 MG capsule      Take 200 mg by mouth every 4 hours as needed.        levalbuterol 45 MCG/ACT Inhaler    XOPENEX HFA    1 Inhaler    Inhale 2 puffs into the lungs every 4 hours as needed    Mild persistent asthma, uncomplicated       losartan 100 MG tablet    COZAAR    90 tablet    TAKE 1 TABLET (100 MG) BY MOUTH DAILY    Essential hypertension with goal blood pressure less than 140/90       metFORMIN 1000 MG tablet    GLUCOPHAGE    180 tablet    Take 1 tablet (1,000 mg) by mouth 2 times daily (with meals)    Type 2 diabetes mellitus without complication, without long-term current use of insulin (H)       methotrexate sodium 2.5 MG Tabs     96 tablet    Take 20 mg by mouth once a week . Take all 8 tablets on the same day of each week.    Seronegative rheumatoid arthritis (H)       metoprolol 25 MG 24 hr tablet    TOPROL-XL    30 tablet    Take 1 tablet (25 mg) by mouth daily    Hypertension goal BP (blood pressure) < 140/90       order for DME     1 each    Class I Jobst compression stockings    Edema of both ankles       simvastatin 10 MG tablet    ZOCOR    90 tablet    Take 1 tablet (10 mg) by mouth At Bedtime    Hyperlipidemia LDL goal <100       tacrolimus 0.1 % ointment    PROTOPIC    60 g    Apply to the face daily, already failed desonide    AD (atopic dermatitis)       triamcinolone 55 MCG/ACT Inhaler     NASACORT     Spray 2 sprays into both nostrils daily    Seasonal allergic rhinitis       VITAMIN C PO      Take  by mouth.        VITAMIN D PO      Take  by mouth.

## 2017-11-16 NOTE — PROGRESS NOTES
DATE OF VISIT:      11/02/2017   Jorge Abarca was referred to me by Cheryl Gautam.  She complains of bilateral foot pain.  The left seems to be worse.  It is aggravated by activity and relieved by rest.  This started when she fell down two stairs at work on 09/07/2017.  She was seen in the clinic at that time and diagnosed with an ankle sprain and had work restrictions and ankle braces on RICE protocol.  She continued this with some improvement, especially on the right, however, the left still bothered her.  She points area of her styloid process.  She has tried to get back into her work shoes which are safety shoes, nonskid, however, she has pain on the outside portion of her left foot.  It is aggravated activity and relieved by rest.  She is here for reevaluation of this.  She gets some pain in her right foot as well but this is less.  She denies any past history of problems with her feet.  She has a history of diabetes and morbid obesity.     11/16/17  Patient states that both feet now feeling better.  Much improved as of a week ago.  Wearing stiff shoes at all times.  Only rare pain now lateral left foot.  She would like to go back to work on her feet part time.         SOCIAL HISTORY:  She has never smoked.        REVIEW OF SYSTEMS:  Denies edema, weakness, numbness       Allergies   Allergen Reactions     Aleve [Naproxen Sodium] Hives     Hydralazine      Nexium [Esomeprazole Magnesium Trihydrate] Hives     HIVES     Shellfish Allergy Nausea     Sulfa Drugs      Stomach upset     Amlodipine Besylate Rash     Hctz Rash     Lisinopril Rash       Current Outpatient Prescriptions   Medication Sig Dispense Refill     metoprolol (TOPROL-XL) 25 MG 24 hr tablet Take 1 tablet (25 mg) by mouth daily 30 tablet 0     losartan (COZAAR) 100 MG tablet TAKE 1 TABLET (100 MG) BY MOUTH DAILY 90 tablet 3     metFORMIN (GLUCOPHAGE) 1000 MG tablet Take 1 tablet (1,000 mg) by mouth 2 times daily (with meals) 180 tablet 3     order  for DME Class I [employer] compression stockings 1 each 1     fluticasone (FLOVENT HFA) 110 MCG/ACT Inhaler Inhale 1 puff into the lungs daily 1 Inhaler 3     hydrOXYzine (ATARAX) 25 MG tablet TAKE 1 TO 2 TABLETS BY MOUTH EVERY NIGHT AT BEDTIME AS NEEDED FOR ITCH 60 tablet 11     cetirizine (ZYRTEC ALLERGY) 10 MG tablet Take 1-2 tablets (10-20 mg) by mouth daily as needed for allergies 60 tablet 11     simvastatin (ZOCOR) 10 MG tablet Take 1 tablet (10 mg) by mouth At Bedtime 90 tablet 3     hydroxychloroquine (PLAQUENIL) 200 MG tablet Take 2 tablets (400 mg) by mouth daily 180 tablet 3     folic acid (FOLVITE) 1 MG tablet Take 1 tablet (1 mg) by mouth daily 100 tablet 3     methotrexate sodium 2.5 MG TABS Take 20 mg by mouth once a week . Take all 8 tablets on the same day of each week. 96 tablet 1     aspirin 81 MG tablet Take 1 tablet (81 mg) by mouth daily 30 tablet      blood glucose monitoring (NO BRAND SPECIFIED) test strip Use to test blood sugar 1 times daily or as directed.  One touch ultra test strips 3 Month 3     tacrolimus (PROTOPIC) 0.1 % ointment Apply to the face daily, already failed desonide 60 g 0     triamcinolone (NASACORT) 55 MCG/ACT nasal inhaler Spray 2 sprays into both nostrils daily       levalbuterol (XOPENEX HFA) 45 MCG/ACT inhaler Inhale 2 puffs into the lungs every 4 hours as needed 1 Inhaler 3     CRANBERRY        FISH OIL        Cholecalciferol (VITAMIN D PO) Take  by mouth.       Ascorbic Acid (VITAMIN C PO) Take  by mouth.       Multiple Vitamin (DAILY MULTIVITAMIN PO) Take  by mouth.       Calcium Carbonate-Vit D-Min (CALCIUM 1200 PO) Take  by mouth.       ibuprofen 200 MG capsule Take 200 mg by mouth every 4 hours as needed.       [DISCONTINUED] amLODIPine (NORVASC) 5 MG tablet Take 1 tablet (5 mg) by mouth daily 90 tablet 1       Patient Active Problem List   Diagnosis     Hypertension goal BP (blood pressure) < 140/90     Allergy to mold spores     House dust mite allergy      Allergic rhinitis due to animal dander     Need for desensitization to allergens     Diagnostic skin and sensitization tests     Hyperlipidemia LDL goal <100     Degenerative disc disease, lumbar     Lumbar disc herniation     History of DVT (deep vein thrombosis)     Mild persistent asthma without complication     Seronegative rheumatoid arthritis (H)     High risk medications (not anticoagulants) long-term use     Trigger finger, left middle finger     Allergic rhinitis due to pollen     Type 2 diabetes mellitus without complication, without long-term current use of insulin (H)     Seasonal allergic rhinitis, unspecified allergic rhinitis trigger     Morbid obesity due to excess calories (H)     Grief     Advanced directives, counseling/discussion       Past Medical History:   Diagnosis Date     Allergic rhinitis due to animal dander      Allergy to mold spores     7/03 skin tests per [facility]: pos. for cat/dog/CR/DM/M/T/G/W     Diabetes (H)      Diagnostic skin and sensitization tests 7/03 skin tests per [facility]: pos. for cat/dog/CR/DM/M/T/G/W     DVT (deep venous thrombosis) (H)      Eczema     sees [facility]     GERD (gastroesophageal reflux disease)      High cholesterol     occ.     House dust mite allergy      HTN (hypertension)      Indigestion     uses OTC Zantac prn     Mild persistent asthma      Morbid obesity due to excess calories (H)      Need for desensitization to allergens 9/03 started at [facility]. for: cat/dog/DM/M/T/G/W    start IT at [facility]: about 1/12      PONV (postoperative nausea and vomiting)      RA (rheumatoid arthritis) (H)      Seasonal allergic rhinitis        Past Surgical History:   Procedure Laterality Date     BACK SURGERY  2009     [facility] SURGICAL PATHOLOGY  1988    left foot     DISCECTOMY LUMBAR POSTERIOR MICROSCOPIC ONE LEVEL  3/18/2013    Procedure: DISCECTOMY LUMBAR POSTERIOR MICROSCOPIC ONE LEVEL;  Left Lumbar 4-5 Micro Discectomy;  Surgeon: Dallas Jensen MD;   Location: [facility]     TONSILLECTOMY & ADENOIDECTOMY  age 3       Family History   Problem Relation Age of Onset     Cardiovascular Sister      atrial fibrillation     CEREBROVASCULAR DISEASE Brother      carotid stenosis     CANCER Father      lung     DIABETES Mother      Breast Cancer Maternal Grandmother      also cousin on this side     Breast Cancer Paternal Aunt      C.A.D. No family hx of      Autoimmune Disease No family hx of        Social History   Substance Use Topics     Smoking status: Never Smoker     Smokeless tobacco: Never Used     Alcohol use No          PHYSICAL EXAMINATION:     VITAL SIGNS:  She has a pulse of 98, her pO2 is 100%.  She weighs 232 pounds and has a BMI of 37.46.     CONSTITUTIONAL:   She is very pleasant to talk with today and in no apparent distress, appears well-nourished cooperative with history and physical exam.     PSYCHIATRIC:  The patient answered questions appropriately, normal affect, seems to have reasonable expectations in terms of treatment.     HEENT:  Eyes visual scanning, tracking, without deficit.  Ears, response to auditory stimuli is normal, no hearing aid devices, auricles proper alignment.     LYMPHATIC:  Popliteal lymph nodes not enlarged.     LUNGS:  Nonlabored breathing. nonlabored speech, no cough, no audible wheezing even quiet breathing.     VASCULAR:  She has palpable pedal pulses both the DP and PT.  Capillary refill less than 3 seconds in all digits.  Toes are warm to touch.  Slight ankle edema noted.  Slight ankle varicosities.  Positive hair growth bilaterally.     NEUROLOGIC:  Alert and oriented x3, coordinated gait.  Light touch is intact on L4-L5 S1 distributions, no obvious deficits.  No obvious neurological based weakness or spasticity or contracture of the lower extremities.     DERMATOLOGIC:  Skin is normal Texture and turgor.  There is some edema and ecchymosis on both feet.     MUSCULOSKELETAL:  On the left foot there is now no pain over the  fourth and fifth tarsometatarsal joints.  No pain on the left styloid process.  No pain stressing left lateral tendons.  Right ankle unremarkable.  Muscle strength 4/5 in all compartments.   Past X-rays show that on the left styloid process, there is a small nondisplaced fracture.  Otherwise, left foot exam was unremarkable.  The right foot x-rays are unremarkable.        ASSESSMENT:   1.  Right ankle sprain.   2.  Left ankle sprain.   3.  Left metatarsal avulsion fracture.      PLAN:  All areas much better now.  Back to work Monday on her feet for only 4 hours per day for next two weeks.  After that no restrictions.  Return to clinic prn.               PANKAJ PALMA DPM

## 2017-11-16 NOTE — LETTER
[address]  [place] [address]  Phone: [phone]    November 16, 2017        Jorge Abarca  [address] [facility]  [place] [address]          To whom it may concern:    RE: Jorge Abarca    Patient was seen and treated today at our clinic.    Patient may return to work 11/20/17 with the following:  No more than standing for 4 hours a day while wearing safety shoes  for 2 weeks.    12/11/17 Return to work with no Restrictions.      Please contact me for questions or concerns.      Sincerely,        Dr. Asim Aguila D.P.M FAC FAS

## 2017-11-16 NOTE — LETTER
11/16/2017         RE: Jorge Abarca  [address] [place] [address]        Dear Colleague,    Thank you for referring your patient, Jorge Abarca, to the [facility]. Please see a copy of my visit note below.    DATE OF VISIT:      11/02/2017   Jorge Abarca was referred to me by Cheryl Gautam.  She complains of bilateral foot pain.  The left seems to be worse.  It is aggravated by activity and relieved by rest.  This started when she fell down two stairs at work on 09/07/2017.  She was seen in the clinic at that time and diagnosed with an ankle sprain and had work restrictions and ankle braces on RICE protocol.  She continued this with some improvement, especially on the right, however, the left still bothered her.  She points area of her styloid process.  She has tried to get back into her work shoes which are safety shoes, nonskid, however, she has pain on the outside portion of her left foot.  It is aggravated activity and relieved by rest.  She is here for reevaluation of this.  She gets some pain in her right foot as well but this is less.  She denies any past history of problems with her feet.  She has a history of diabetes and morbid obesity.     11/16/17  Patient states that both feet now feeling better.  Much improved as of a week ago.  Wearing stiff shoes at all times.  Only rare pain now lateral left foot.  She would like to go back to work on her feet part time.         SOCIAL HISTORY:  She has never smoked.        REVIEW OF SYSTEMS:  Denies edema, weakness, numbness       Allergies   Allergen Reactions     Aleve [Naproxen Sodium] Hives     Hydralazine      Nexium [Esomeprazole Magnesium Trihydrate] Hives     HIVES     Shellfish Allergy Nausea     Sulfa Drugs      Stomach upset     Amlodipine Besylate Rash     Hctz Rash     Lisinopril Rash       Current Outpatient Prescriptions   Medication Sig Dispense Refill     metoprolol (TOPROL-XL) 25 MG 24 hr tablet Take 1 tablet  (25 mg) by mouth daily 30 tablet 0     losartan (COZAAR) 100 MG tablet TAKE 1 TABLET (100 MG) BY MOUTH DAILY 90 tablet 3     metFORMIN (GLUCOPHAGE) 1000 MG tablet Take 1 tablet (1,000 mg) by mouth 2 times daily (with meals) 180 tablet 3     order for DME Class I [employer] compression stockings 1 each 1     fluticasone (FLOVENT HFA) 110 MCG/ACT Inhaler Inhale 1 puff into the lungs daily 1 Inhaler 3     hydrOXYzine (ATARAX) 25 MG tablet TAKE 1 TO 2 TABLETS BY MOUTH EVERY NIGHT AT BEDTIME AS NEEDED FOR ITCH 60 tablet 11     cetirizine (ZYRTEC ALLERGY) 10 MG tablet Take 1-2 tablets (10-20 mg) by mouth daily as needed for allergies 60 tablet 11     simvastatin (ZOCOR) 10 MG tablet Take 1 tablet (10 mg) by mouth At Bedtime 90 tablet 3     hydroxychloroquine (PLAQUENIL) 200 MG tablet Take 2 tablets (400 mg) by mouth daily 180 tablet 3     folic acid (FOLVITE) 1 MG tablet Take 1 tablet (1 mg) by mouth daily 100 tablet 3     methotrexate sodium 2.5 MG TABS Take 20 mg by mouth once a week . Take all 8 tablets on the same day of each week. 96 tablet 1     aspirin 81 MG tablet Take 1 tablet (81 mg) by mouth daily 30 tablet      blood glucose monitoring (NO BRAND SPECIFIED) test strip Use to test blood sugar 1 times daily or as directed.  One touch ultra test strips 3 Month 3     tacrolimus (PROTOPIC) 0.1 % ointment Apply to the face daily, already failed desonide 60 g 0     triamcinolone (NASACORT) 55 MCG/ACT nasal inhaler Spray 2 sprays into both nostrils daily       levalbuterol (XOPENEX HFA) 45 MCG/ACT inhaler Inhale 2 puffs into the lungs every 4 hours as needed 1 Inhaler 3     CRANBERRY        FISH OIL        Cholecalciferol (VITAMIN D PO) Take  by mouth.       Ascorbic Acid (VITAMIN C PO) Take  by mouth.       Multiple Vitamin (DAILY MULTIVITAMIN PO) Take  by mouth.       Calcium Carbonate-Vit D-Min (CALCIUM 1200 PO) Take  by mouth.       ibuprofen 200 MG capsule Take 200 mg by mouth every 4 hours as needed.        [DISCONTINUED] amLODIPine (NORVASC) 5 MG tablet Take 1 tablet (5 mg) by mouth daily 90 tablet 1       Patient Active Problem List   Diagnosis     Hypertension goal BP (blood pressure) < 140/90     Allergy to mold spores     House dust mite allergy     Allergic rhinitis due to animal dander     Need for desensitization to allergens     Diagnostic skin and sensitization tests     Hyperlipidemia LDL goal <100     Degenerative disc disease, lumbar     Lumbar disc herniation     History of DVT (deep vein thrombosis)     Mild persistent asthma without complication     Seronegative rheumatoid arthritis (H)     High risk medications (not anticoagulants) long-term use     Trigger finger, left middle finger     Allergic rhinitis due to pollen     Type 2 diabetes mellitus without complication, without long-term current use of insulin (H)     Seasonal allergic rhinitis, unspecified allergic rhinitis trigger     Morbid obesity due to excess calories (H)     Grief     Advanced directives, counseling/discussion       Past Medical History:   Diagnosis Date     Allergic rhinitis due to animal dander      Allergy to mold spores     7/03 skin tests per [facility]: pos. for cat/dog/CR/DM/M/T/G/W     Diabetes (H)      Diagnostic skin and sensitization tests 7/03 skin tests per [facility]: pos. for cat/dog/CR/DM/M/T/G/W     DVT (deep venous thrombosis) (H)      Eczema     sees [facility]     GERD (gastroesophageal reflux disease)      High cholesterol     occ.     House dust mite allergy      HTN (hypertension)      Indigestion     uses OTC Zantac prn     Mild persistent asthma      Morbid obesity due to excess calories (H)      Need for desensitization to allergens 9/03 started at [facility]. for: cat/dog/DM/M/T/G/W    start IT at [facility]: about 1/12      PONV (postoperative nausea and vomiting)      RA (rheumatoid arthritis) (H)      Seasonal allergic rhinitis        Past Surgical History:   Procedure Laterality Date     BACK SURGERY  2009     [facility]FF  SURGICAL PATHOLOGY  1988    left foot     DISCECTOMY LUMBAR POSTERIOR MICROSCOPIC ONE LEVEL  3/18/2013    Procedure: DISCECTOMY LUMBAR POSTERIOR MICROSCOPIC ONE LEVEL;  Left Lumbar 4-5 Micro Discectomy;  Surgeon: Dallas Jensen MD;  Location: [facility]     TONSILLECTOMY & ADENOIDECTOMY  age 3       Family History   Problem Relation Age of Onset     Cardiovascular Sister      atrial fibrillation     CEREBROVASCULAR DISEASE Brother      carotid stenosis     CANCER Father      lung     DIABETES Mother      Breast Cancer Maternal Grandmother      also cousin on this side     Breast Cancer Paternal Aunt      C.A.D. No family hx of      Autoimmune Disease No family hx of        Social History   Substance Use Topics     Smoking status: Never Smoker     Smokeless tobacco: Never Used     Alcohol use No          PHYSICAL EXAMINATION:     VITAL SIGNS:  She has a pulse of 98, her pO2 is 100%.  She weighs 232 pounds and has a BMI of 37.46.     CONSTITUTIONAL:   She is very pleasant to talk with today and in no apparent distress, appears well-nourished cooperative with history and physical exam.     PSYCHIATRIC:  The patient answered questions appropriately, normal affect, seems to have reasonable expectations in terms of treatment.     HEENT:  Eyes visual scanning, tracking, without deficit.  Ears, response to auditory stimuli is normal, no hearing aid devices, auricles proper alignment.     LYMPHATIC:  Popliteal lymph nodes not enlarged.     LUNGS:  Nonlabored breathing. nonlabored speech, no cough, no audible wheezing even quiet breathing.     VASCULAR:  She has palpable pedal pulses both the DP and PT.  Capillary refill less than 3 seconds in all digits.  Toes are warm to touch.  Slight ankle edema noted.  Slight ankle varicosities.  Positive hair growth bilaterally.     NEUROLOGIC:  Alert and oriented x3, coordinated gait.  Light touch is intact on L4-L5 S1 distributions, no obvious deficits.  No obvious  neurological based weakness or spasticity or contracture of the lower extremities.     DERMATOLOGIC:  Skin is normal Texture and turgor.  There is some edema and ecchymosis on both feet.     MUSCULOSKELETAL:  On the left foot there is now no pain over the fourth and fifth tarsometatarsal joints.  No pain on the left styloid process.  No pain stressing left lateral tendons.  Right ankle unremarkable.  Muscle strength 4/5 in all compartments.   Past X-rays show that on the left styloid process, there is a small nondisplaced fracture.  Otherwise, left foot exam was unremarkable.  The right foot x-rays are unremarkable.        ASSESSMENT:   1.  Right ankle sprain.   2.  Left ankle sprain.   3.  Left metatarsal avulsion fracture.      PLAN:  All areas much better now.  Back to work Monday on her feet for only 4 hours per day for next two weeks.  After that no restrictions.  Return to clinic prn.               PANAKJ AGUILA DPM                   Again, thank you for allowing me to participate in the care of your patient.        Sincerely,        Pankaj Aguila DPM

## 2017-11-17 ENCOUNTER — MYC MEDICAL ADVICE (OUTPATIENT)
Dept: PODIATRY | Facility: CLINIC | Age: 57
End: 2017-11-17

## 2017-12-12 ENCOUNTER — ALLIED HEALTH/NURSE VISIT (OUTPATIENT)
Dept: NURSING | Facility: CLINIC | Age: 57
End: 2017-12-12
Payer: COMMERCIAL

## 2017-12-12 VITALS — DIASTOLIC BLOOD PRESSURE: 86 MMHG | HEART RATE: 89 BPM | SYSTOLIC BLOOD PRESSURE: 132 MMHG

## 2017-12-12 DIAGNOSIS — I10 HYPERTENSION GOAL BP (BLOOD PRESSURE) < 140/90: Primary | ICD-10-CM

## 2017-12-12 PROCEDURE — 99207 ZZC NO CHARGE NURSE ONLY: CPT

## 2017-12-12 NOTE — MR AVS SNAPSHOT
After Visit Summary   12/12/2017    Jorge Abarca    MRN: [id number]           Patient Information     Date Of Birth          1960        Visit Information        Provider Department      12/12/2017 3:00 PM FZ ANCILLARY [facility] [place]        Today's Diagnoses     Hypertension goal BP (blood pressure) < 140/90    -  1       Follow-ups after your visit        Your next 10 appointments already scheduled     Dec 19, 2017  9:00 AM CST   Lab visit with FK LAB   [facility] ([facility])    [address] [address] [phone]           Please do not eat 10-12 hours before your appointment if you are coming in fasting for labs on lipids, cholesterol, or glucose (sugar). Does not apply to pregnant women.  Water with medications is okay. Do not drink coffee or other fluids.  If you have concerns about taking your medications, please send a message by clicking on Secure Messaging, Message Your Care Team.            Jan 03, 2018  3:20 PM CST   Return Visit with Chris Capellan MD   [facility] [place] ([facility])    [address]  [place] [id number] [phone]              Who to contact     If you have questions or need follow up information about today's clinic visit or your schedule please contact [facility] [place] directly at [phone].  Normal or non-critical lab and imaging results will be communicated to you by MyChart, letter or phone within 4 business days after the clinic has received the results. If you do not hear from us within 7 days, please contact the clinic through MyChart or phone. If you have a critical or abnormal lab result, we will notify you by phone as soon as possible.  Submit refill requests through Datamars or call your pharmacy and they will forward the refill request to us. Please allow 3 business days for your refill to be completed.          Additional Information About Your  Visit        MyChart Information     My1loginhart gives you secure access to your electronic health record. If you see a primary care provider, you can also send messages to your care team and make appointments. If you have questions, please call your primary care clinic.  If you do not have a primary care provider, please call [phone] and they will assist you.        Care EveryWhere ID     This is your Care EveryWhere ID. This could be used by other organizations to access your [place] medical records  [id number]        Your Vitals Were     Pulse Last Period                89 03/04/2011           Blood Pressure from Last 3 Encounters:   12/12/17 132/86   11/14/17 158/90   10/26/17 160/82    Weight from Last 3 Encounters:   11/16/17 232 lb (105.2 kg)   11/14/17 232 lb (105.2 kg)   11/02/17 232 lb (105.2 kg)              Today, you had the following     No orders found for display       Primary Care Provider Office Phone # Fax #    Paula Alarcon [phone] [address] [address]        Equal Access to Services     [facility]: Hadii aad ku hadasho Soomaali, waaxda luqadaha, qaybta kaalmada opal, kodi granados . So [facility] [phone].    ATENCIÓN: Si habla español, tiene a murphy disposición servicios gratNew Sunrise Regional Treatment Centeros de asistencia lingüística. [facility] [phone].    We comply with applicable federal civil rights laws and [place] laws. We do not discriminate on the basis of race, color, national origin, age, disability, sex, sexual orientation, or gender identity.            Thank you!     Thank you for choosing [facility]  for your care. Our goal is always to provide you with excellent care. Hearing back from our patients is one way we can continue to improve our services. Please take a few minutes to complete the written survey that you may receive in the mail after your visit with us. Thank you!             Your Updated  Medication List - Protect others around you: Learn how to safely use, store and throw away your medicines at www.disposemymeds.org.          This list is accurate as of: 12/12/17  3:15 PM.  Always use your most recent med list.                   Brand Name Dispense Instructions for use Diagnosis    aspirin 81 MG tablet     30 tablet    Take 1 tablet (81 mg) by mouth daily    Type 2 diabetes mellitus without complication, without long-term current use of insulin (H)       blood glucose monitoring test strip    no brand specified    3 Month    Use to test blood sugar 1 times daily or as directed. One touch ultra test strips    DM type 2, goal A1c below 7       CALCIUM 1200 PO      Take  by mouth.        cetirizine 10 MG tablet    ZYRTEC ALLERGY    60 tablet    Take 1-2 tablets (10-20 mg) by mouth daily as needed for allergies    Itchy skin       CRANBERRY           DAILY MULTIVITAMIN PO      Take  by mouth.        FISH OIL           fluticasone 110 MCG/ACT Inhaler    FLOVENT HFA    1 Inhaler    Inhale 1 puff into the lungs daily    Mild persistent asthma without complication       folic acid 1 MG tablet    FOLVITE    100 tablet    Take 1 tablet (1 mg) by mouth daily    Seronegative rheumatoid arthritis (H)       hydroxychloroquine 200 MG tablet    PLAQUENIL    180 tablet    Take 2 tablets (400 mg) by mouth daily    Seronegative rheumatoid arthritis (H)       hydrOXYzine 25 MG tablet    ATARAX    60 tablet    TAKE 1 TO 2 TABLETS BY MOUTH EVERY NIGHT AT BEDTIME AS NEEDED FOR ITCH    Itchy skin       ibuprofen 200 MG capsule      Take 200 mg by mouth every 4 hours as needed.        levalbuterol 45 MCG/ACT Inhaler    XOPENEX HFA    1 Inhaler    Inhale 2 puffs into the lungs every 4 hours as needed    Mild persistent asthma, uncomplicated       losartan 100 MG tablet    COZAAR    90 tablet    TAKE 1 TABLET (100 MG) BY MOUTH DAILY    Essential hypertension with goal blood pressure less than 140/90       metFORMIN 1000 MG  tablet    GLUCOPHAGE    180 tablet    Take 1 tablet (1,000 mg) by mouth 2 times daily (with meals)    Type 2 diabetes mellitus without complication, without long-term current use of insulin (H)       methotrexate sodium 2.5 MG Tabs     96 tablet    Take 20 mg by mouth once a week . Take all 8 tablets on the same day of each week.    Seronegative rheumatoid arthritis (H)       metoprolol 25 MG 24 hr tablet    TOPROL-XL    30 tablet    Take 1 tablet (25 mg) by mouth daily    Hypertension goal BP (blood pressure) < 140/90       order for DME     1 each    Class I Jobst compression stockings    Edema of both ankles       simvastatin 10 MG tablet    ZOCOR    90 tablet    Take 1 tablet (10 mg) by mouth At Bedtime    Hyperlipidemia LDL goal <100       tacrolimus 0.1 % ointment    PROTOPIC    60 g    Apply to the face daily, already failed desonide    AD (atopic dermatitis)       triamcinolone 55 MCG/ACT Inhaler    NASACORT     Spray 2 sprays into both nostrils daily    Seasonal allergic rhinitis       VITAMIN C PO      Take  by mouth.        VITAMIN D PO      Take  by mouth.

## 2017-12-12 NOTE — PROGRESS NOTES
Jorge Abarca is a 57 year old female who comes in today for a Blood Pressure check because of medication change.    *Document pulse and BP  *Use new set of vitals button for multiple readings.  *Use extended vitals for orthostatic    Vitals as recorded, a large cuff was used.    Patient is taking medication as prescribed  Patient is tolerating medications well.  Patient is not monitoring Blood Pressure at home.     Current complaints: none    Disposition: results routed to MD/FLOR

## 2017-12-19 DIAGNOSIS — D64.9 NORMOCYTIC ANEMIA: ICD-10-CM

## 2017-12-19 DIAGNOSIS — I10 HYPERTENSION GOAL BP (BLOOD PRESSURE) < 140/90: ICD-10-CM

## 2017-12-19 DIAGNOSIS — E66.01 MORBID OBESITY DUE TO EXCESS CALORIES (H): ICD-10-CM

## 2017-12-19 DIAGNOSIS — M06.00 SERONEGATIVE RHEUMATOID ARTHRITIS (H): ICD-10-CM

## 2017-12-19 DIAGNOSIS — Z79.899 HIGH RISK MEDICATIONS (NOT ANTICOAGULANTS) LONG-TERM USE: ICD-10-CM

## 2017-12-19 DIAGNOSIS — E78.5 HYPERLIPIDEMIA LDL GOAL <100: ICD-10-CM

## 2017-12-19 DIAGNOSIS — E11.9 TYPE 2 DIABETES MELLITUS WITHOUT COMPLICATION, WITHOUT LONG-TERM CURRENT USE OF INSULIN (H): ICD-10-CM

## 2017-12-19 LAB
ALBUMIN SERPL-MCNC: 3.8 G/DL (ref 3.4–5)
ALP SERPL-CCNC: 74 U/L (ref 40–150)
ALT SERPL W P-5'-P-CCNC: 34 U/L (ref 0–50)
AST SERPL W P-5'-P-CCNC: 32 U/L (ref 0–45)
BASOPHILS # BLD AUTO: 0 10E9/L (ref 0–0.2)
BASOPHILS NFR BLD AUTO: 0.5 %
BILIRUB DIRECT SERPL-MCNC: 0.1 MG/DL (ref 0–0.2)
BILIRUB SERPL-MCNC: 0.5 MG/DL (ref 0.2–1.3)
CHOLEST SERPL-MCNC: 127 MG/DL
CREAT SERPL-MCNC: 0.78 MG/DL (ref 0.52–1.04)
CREAT UR-MCNC: 168 MG/DL
CRP SERPL-MCNC: 3.1 MG/L (ref 0–8)
DIFFERENTIAL METHOD BLD: ABNORMAL
EOSINOPHIL # BLD AUTO: 0.6 10E9/L (ref 0–0.7)
EOSINOPHIL NFR BLD AUTO: 12.9 %
ERYTHROCYTE [DISTWIDTH] IN BLOOD BY AUTOMATED COUNT: 15.7 % (ref 10–15)
ERYTHROCYTE [SEDIMENTATION RATE] IN BLOOD BY WESTERGREN METHOD: 11 MM/H (ref 0–30)
FERRITIN SERPL-MCNC: 31 NG/ML (ref 8–252)
FOLATE SERPL-MCNC: 22.6 NG/ML
GFR SERPL CREATININE-BSD FRML MDRD: 76 ML/MIN/1.7M2
HBA1C MFR BLD: 6.4 % (ref 4.3–6)
HCT VFR BLD AUTO: 35.6 % (ref 35–47)
HDLC SERPL-MCNC: 73 MG/DL
HGB BLD-MCNC: 11.8 G/DL (ref 11.7–15.7)
IRON SATN MFR SERPL: 21 % (ref 15–46)
IRON SERPL-MCNC: 68 UG/DL (ref 35–180)
LDLC SERPL CALC-MCNC: 39 MG/DL
LYMPHOCYTES # BLD AUTO: 0.9 10E9/L (ref 0.8–5.3)
LYMPHOCYTES NFR BLD AUTO: 20.5 %
MCH RBC QN AUTO: 29.9 PG (ref 26.5–33)
MCHC RBC AUTO-ENTMCNC: 33.1 G/DL (ref 31.5–36.5)
MCV RBC AUTO: 90 FL (ref 78–100)
MICROALBUMIN UR-MCNC: 12 MG/L
MICROALBUMIN/CREAT UR: 7.14 MG/G CR (ref 0–25)
MONOCYTES # BLD AUTO: 0.3 10E9/L (ref 0–1.3)
MONOCYTES NFR BLD AUTO: 6.9 %
NEUTROPHILS # BLD AUTO: 2.6 10E9/L (ref 1.6–8.3)
NEUTROPHILS NFR BLD AUTO: 59.2 %
NONHDLC SERPL-MCNC: 54 MG/DL
PLATELET # BLD AUTO: 310 10E9/L (ref 150–450)
PROT SERPL-MCNC: 7.3 G/DL (ref 6.8–8.8)
RBC # BLD AUTO: 3.95 10E12/L (ref 3.8–5.2)
TIBC SERPL-MCNC: 328 UG/DL (ref 240–430)
TRIGL SERPL-MCNC: 73 MG/DL
TSH SERPL DL<=0.005 MIU/L-ACNC: 2.49 MU/L (ref 0.4–4)
VIT B12 SERPL-MCNC: 610 PG/ML (ref 193–986)
WBC # BLD AUTO: 4.4 10E9/L (ref 4–11)

## 2017-12-19 PROCEDURE — 85652 RBC SED RATE AUTOMATED: CPT | Performed by: INTERNAL MEDICINE

## 2017-12-19 PROCEDURE — 82746 ASSAY OF FOLIC ACID SERUM: CPT | Performed by: FAMILY MEDICINE

## 2017-12-19 PROCEDURE — 84443 ASSAY THYROID STIM HORMONE: CPT | Performed by: FAMILY MEDICINE

## 2017-12-19 PROCEDURE — 86140 C-REACTIVE PROTEIN: CPT | Performed by: INTERNAL MEDICINE

## 2017-12-19 PROCEDURE — 80076 HEPATIC FUNCTION PANEL: CPT | Performed by: INTERNAL MEDICINE

## 2017-12-19 PROCEDURE — 82728 ASSAY OF FERRITIN: CPT | Performed by: FAMILY MEDICINE

## 2017-12-19 PROCEDURE — 83036 HEMOGLOBIN GLYCOSYLATED A1C: CPT | Performed by: FAMILY MEDICINE

## 2017-12-19 PROCEDURE — 85025 COMPLETE CBC W/AUTO DIFF WBC: CPT | Performed by: INTERNAL MEDICINE

## 2017-12-19 PROCEDURE — 82565 ASSAY OF CREATININE: CPT | Performed by: INTERNAL MEDICINE

## 2017-12-19 PROCEDURE — 83540 ASSAY OF IRON: CPT | Performed by: FAMILY MEDICINE

## 2017-12-19 PROCEDURE — 82043 UR ALBUMIN QUANTITATIVE: CPT | Performed by: FAMILY MEDICINE

## 2017-12-19 PROCEDURE — 83550 IRON BINDING TEST: CPT | Performed by: FAMILY MEDICINE

## 2017-12-19 PROCEDURE — 82607 VITAMIN B-12: CPT | Performed by: FAMILY MEDICINE

## 2017-12-19 PROCEDURE — 80061 LIPID PANEL: CPT | Performed by: FAMILY MEDICINE

## 2017-12-19 PROCEDURE — 36415 COLL VENOUS BLD VENIPUNCTURE: CPT | Performed by: INTERNAL MEDICINE

## 2018-01-03 ENCOUNTER — OFFICE VISIT (OUTPATIENT)
Dept: RHEUMATOLOGY | Facility: CLINIC | Age: 58
End: 2018-01-03
Payer: COMMERCIAL

## 2018-01-03 VITALS
WEIGHT: 233.2 LBS | OXYGEN SATURATION: 98 % | BODY MASS INDEX: 37.64 KG/M2 | DIASTOLIC BLOOD PRESSURE: 82 MMHG | TEMPERATURE: 97.7 F | SYSTOLIC BLOOD PRESSURE: 144 MMHG | HEART RATE: 90 BPM

## 2018-01-03 DIAGNOSIS — M06.00 SERONEGATIVE RHEUMATOID ARTHRITIS (H): Primary | ICD-10-CM

## 2018-01-03 DIAGNOSIS — M65.322 TRIGGER INDEX FINGER OF LEFT HAND: ICD-10-CM

## 2018-01-03 PROCEDURE — 99213 OFFICE O/P EST LOW 20 MIN: CPT | Mod: 25 | Performed by: INTERNAL MEDICINE

## 2018-01-03 PROCEDURE — 20550 NJX 1 TENDON SHEATH/LIGAMENT: CPT | Mod: LT | Performed by: INTERNAL MEDICINE

## 2018-01-03 RX ORDER — METHYLPREDNISOLONE ACETATE 40 MG/ML
10 INJECTION, SUSPENSION INTRA-ARTICULAR; INTRALESIONAL; INTRAMUSCULAR; SOFT TISSUE ONCE
Qty: 0.25 ML | Refills: 0 | OUTPATIENT
Start: 2018-01-03 | End: 2018-01-03

## 2018-01-03 RX ORDER — METHOTREXATE 2.5 MG/1
20 TABLET ORAL WEEKLY
Qty: 96 TABLET | Refills: 1 | Status: SHIPPED | OUTPATIENT
Start: 2018-01-03 | End: 2018-04-04

## 2018-01-03 RX ORDER — HYDROXYCHLOROQUINE SULFATE 200 MG/1
200 TABLET, FILM COATED ORAL 2 TIMES DAILY
Qty: 180 TABLET | Refills: 3 | Status: SHIPPED | OUTPATIENT
Start: 2018-01-03 | End: 2018-07-26

## 2018-01-03 ASSESSMENT — PAIN SCALES - GENERAL: PAINLEVEL: NO PAIN (0)

## 2018-01-03 NOTE — MR AVS SNAPSHOT
After Visit Summary   1/3/2018    Jorge Abarca    MRN: [id number]           Patient Information     Date Of Birth          1960        Visit Information        Provider Department      1/3/2018 3:20 PM Chris Capellan MD [facility] [place]        Today's Diagnoses     Seronegative rheumatoid arthritis (H)    -  1    Trigger index finger of left hand          Care Instructions    Patricia's direct line   [phone]          Follow-ups after your visit        Additional Services     [facility] PT, HAND, AND CHIROPRACTIC REFERRAL       **This order will print in the [facility] Scheduling Office**    Hand Therapy is available through:    *[facility]  *[facility]  *[facility]    Call one number to schedule at any of the above locations: [phone].    Your provider has referred you to: Hand Therapy    Indication/Reason for Referral: left 2nd and 3rd digit trigger fingers  Onset of Illness: months  Therapy Orders: Evaluate and Treat  Special Programs: None  Special Request: None    [place]      Additional Comments for the Therapist or Chiropractor: trigger fingers; injected the left 2nd digit on 1/3/2018.     Please be aware that coverage of these services is subject to the terms and limitations of your health insurance plan.  Call member services at your health plan with any benefit or coverage questions.      Please bring the following to your appointment:    *Your personal calendar for scheduling future appointments  *Comfortable clothing                  Your next 10 appointments already scheduled     Apr 02, 2018  3:00 PM CDT   LAB with FZ LAB   [facility] [place] ([facility])    [address]  [place] [address] [phone]           Please do not eat 10-12 hours before your appointment if you are coming in fasting for labs on lipids, cholesterol, or glucose (sugar). This does not apply to pregnant  women. Water, hot tea and black coffee (with nothing added) are okay. Do not drink other fluids, diet soda or chew gum.            Apr 04, 2018  3:40 PM CDT   Return Visit with Chris Capellan MD   [facility] [place] ([facility] [place])    [address] [place] [address]  [place] [address]   [phone]              Future tests that were ordered for you today     Open Future Orders        Priority Expected Expires Ordered    CBC with platelets differential Routine 3/30/2018 4/18/2018 1/3/2018    Creatinine Routine 3/30/2018 4/18/2018 1/3/2018    Erythrocyte sedimentation rate auto Routine 3/30/2018 4/18/2018 1/3/2018    CRP inflammation Routine 3/30/2018 4/18/2018 1/3/2018    Hepatic panel Routine 3/30/2018 4/18/2018 1/3/2018            Who to contact     If you have questions or need follow up information about today's clinic visit or your schedule please contact [facility] [place] directly at [phone].  Normal or non-critical lab and imaging results will be communicated to you by Lesson Prephart, letter or phone within 4 business days after the clinic has received the results. If you do not hear from us within 7 days, please contact the clinic through InstaJobt or phone. If you have a critical or abnormal lab result, we will notify you by phone as soon as possible.  Submit refill requests through El[employer] Digital or call your pharmacy and they will forward the refill request to us. Please allow 3 business days for your refill to be completed.          Additional Information About Your Visit        Lesson PrepharSpreecast Information     [employer] gives you secure access to your electronic health record. If you see a primary care provider, you can also send messages to your care team and make appointments. If you have questions, please call your primary care clinic.  If you do not have a primary care provider, please call [phone] and they will assist you.        Care EveryWhere ID     This is your Care EveryWhere ID. This could  be used by other organizations to access your Center medical records  [id number]        Your Vitals Were     Pulse Temperature Last Period Pulse Oximetry BMI (Body Mass Index)       90 97.7  F (36.5  C) (Oral) 03/04/2011 98% 37.64 kg/m2        Blood Pressure from Last 3 Encounters:   01/03/18 170/88   12/12/17 132/86   11/14/17 158/90    Weight from Last 3 Encounters:   01/03/18 105.8 kg (233 lb 3.2 oz)   11/16/17 105.2 kg (232 lb)   11/14/17 105.2 kg (232 lb)              We Performed the Following     [facility] PT, HAND, AND CHIROPRACTIC REFERRAL          Today's Medication Changes          These changes are accurate as of: 1/3/18  3:51 PM.  If you have any questions, ask your nurse or doctor.               These medicines have changed or have updated prescriptions.        Dose/Directions    hydroxychloroquine 200 MG tablet   Commonly known as:  PLAQUENIL   This may have changed:    - how much to take  - when to take this   Used for:  Seronegative rheumatoid arthritis (H)   Changed by:  Chris Capellan MD        Dose:  200 mg   Take 1 tablet (200 mg) by mouth 2 times daily   Quantity:  180 tablet   Refills:  3            Where to get your medicines      These medications were sent to [facility] [address] [place] [address] [place] - [address]  [address], [place] [address]    Hours:  Test fax successful 12/11/02   Phone:  [phone]     hydroxychloroquine 200 MG tablet    methotrexate sodium 2.5 MG Tabs                Primary Care Provider Office Phone # Fax #    Paula Alarcon [phone]       [address] [place] [address]        Equal Access to Services     [facility]: Jc Steel, black ba, eileen kaalmacastillo joseph, kodi denis. So [facility] [phone].    ATENCIÓN: Si habla español, tiene a murphy disposición servicios gratuitos de asistencia lingüística. Llame al  [phone].    We comply with applicable federal civil rights laws and [place] laws. We do not discriminate on the basis of race, color, national origin, age, disability, sex, sexual orientation, or gender identity.            Thank you!     Thank you for choosing [facility]  for your care. Our goal is always to provide you with excellent care. Hearing back from our patients is one way we can continue to improve our services. Please take a few minutes to complete the written survey that you may receive in the mail after your visit with us. Thank you!             Your Updated Medication List - Protect others around you: Learn how to safely use, store and throw away your medicines at www.disposemymeds.org.          This list is accurate as of: 1/3/18  3:51 PM.  Always use your most recent med list.                   Brand Name Dispense Instructions for use Diagnosis    aspirin 81 MG tablet     30 tablet    Take 1 tablet (81 mg) by mouth daily    Type 2 diabetes mellitus without complication, without long-term current use of insulin (H)       blood glucose monitoring test strip    no brand specified    3 Month    Use to test blood sugar 1 times daily or as directed. One touch ultra test strips    DM type 2, goal A1c below 7       CALCIUM 1200 PO      Take  by mouth.        cetirizine 10 MG tablet    ZYRTEC ALLERGY    60 tablet    Take 1-2 tablets (10-20 mg) by mouth daily as needed for allergies    Itchy skin       CRANBERRY           DAILY MULTIVITAMIN PO      Take  by mouth.        FISH OIL           fluticasone 110 MCG/ACT Inhaler    FLOVENT HFA    1 Inhaler    Inhale 1 puff into the lungs daily    Mild persistent asthma without complication       folic acid 1 MG tablet    FOLVITE    100 tablet    Take 1 tablet (1 mg) by mouth daily    Seronegative rheumatoid arthritis (H)       hydroxychloroquine 200 MG tablet    PLAQUENIL    180 tablet    Take 1 tablet (200 mg) by mouth 2 times daily     Seronegative rheumatoid arthritis (H)       hydrOXYzine 25 MG tablet    ATARAX    60 tablet    TAKE 1 TO 2 TABLETS BY MOUTH EVERY NIGHT AT BEDTIME AS NEEDED FOR ITCH    Itchy skin       ibuprofen 200 MG capsule      Take 200 mg by mouth every 4 hours as needed.        levalbuterol 45 MCG/ACT Inhaler    XOPENEX HFA    1 Inhaler    Inhale 2 puffs into the lungs every 4 hours as needed    Mild persistent asthma, uncomplicated       losartan 100 MG tablet    COZAAR    90 tablet    TAKE 1 TABLET (100 MG) BY MOUTH DAILY    Essential hypertension with goal blood pressure less than 140/90       metFORMIN 1000 MG tablet    GLUCOPHAGE    180 tablet    Take 1 tablet (1,000 mg) by mouth 2 times daily (with meals)    Type 2 diabetes mellitus without complication, without long-term current use of insulin (H)       methotrexate sodium 2.5 MG Tabs     96 tablet    Take 20 mg by mouth once a week . Take all 8 tablets on the same day of each week.    Seronegative rheumatoid arthritis (H)       metoprolol 25 MG 24 hr tablet    TOPROL-XL    30 tablet    TAKE 1 TABLET(25 MG) BY MOUTH DAILY    Hypertension goal BP (blood pressure) < 140/90       order for DME     1 each    Class I Jobst compression stockings    Edema of both ankles       simvastatin 10 MG tablet    ZOCOR    90 tablet    Take 1 tablet (10 mg) by mouth At Bedtime    Hyperlipidemia LDL goal <100       tacrolimus 0.1 % ointment    PROTOPIC    60 g    Apply to the face daily, already failed desonide    AD (atopic dermatitis)       triamcinolone 55 MCG/ACT Inhaler    NASACORT     Spray 2 sprays into both nostrils daily    Seasonal allergic rhinitis       VITAMIN C PO      Take  by mouth.        VITAMIN D PO      Take  by mouth.

## 2018-01-03 NOTE — NURSING NOTE
"Chief Complaint   Patient presents with     RECHECK     6 month follow up; index finger on the left hand has been \"popping\" and inability to bend.       Initial /88 (BP Location: Right arm, Patient Position: Sitting, Cuff Size: Adult Large)  Pulse 90  Temp 97.7  F (36.5  C) (Oral)  Wt 105.8 kg (233 lb 3.2 oz)  LMP 03/04/2011  SpO2 98%  BMI 37.64 kg/m2 Estimated body mass index is 37.64 kg/(m^2) as calculated from the following:    Height as of 11/14/17: 1.676 m (5' 6\").    Weight as of this encounter: 105.8 kg (233 lb 3.2 oz).  BP completed using cuff size: large         RAPID3 (0-30) Cumulative Score  1          RAPID3 Weighted Score (divide #4 by 3 and that is the weighted score)  0.333         "

## 2018-01-03 NOTE — NURSING NOTE
The following medication was given:     MEDICATION: Depo Medrol 40mg  SITE: Left 2nd digit  DOSE: 1 ml  LOT #: [id number]  :  Help[employer]  EXPIRATION DATE:  09/01/2018  NDC#: 5766-7158-84    1% Lidocaine  : Hospira  Lot #: [id number]  EXPIRATION DATE: 05/01/2019  NDC: 1557-6331-20

## 2018-01-16 ENCOUNTER — TRANSFERRED RECORDS (OUTPATIENT)
Dept: HEALTH INFORMATION MANAGEMENT | Facility: CLINIC | Age: 58
End: 2018-01-16

## 2018-01-17 ENCOUNTER — THERAPY VISIT (OUTPATIENT)
Dept: OCCUPATIONAL THERAPY | Facility: CLINIC | Age: 58
End: 2018-01-17
Payer: COMMERCIAL

## 2018-01-17 DIAGNOSIS — M79.644 PAIN OF FINGER OF RIGHT HAND: Primary | ICD-10-CM

## 2018-01-17 DIAGNOSIS — M65.30 TRIGGER FINGER OF RIGHT HAND: ICD-10-CM

## 2018-01-17 PROCEDURE — 97763 ORTHC/PROSTC MGMT SBSQ ENC: CPT | Mod: GO | Performed by: OCCUPATIONAL THERAPIST

## 2018-01-17 PROCEDURE — 97110 THERAPEUTIC EXERCISES: CPT | Mod: GO | Performed by: OCCUPATIONAL THERAPIST

## 2018-01-17 PROCEDURE — 97165 OT EVAL LOW COMPLEX 30 MIN: CPT | Mod: GO | Performed by: OCCUPATIONAL THERAPIST

## 2018-01-17 NOTE — MR AVS SNAPSHOT
After Visit Summary   1/17/2018    Jorge Abarca    MRN: [id number]           Patient Information     Date Of Birth          1960        Visit Information        Provider Department      1/17/2018 3:30 PM Sof[facility] Vora [facility]        Today's Diagnoses     Pain of finger of right hand    -  1    Trigger finger of right hand           Follow-ups after your visit        Your next 10 appointments already scheduled     Apr 02, 2018  3:00 PM CDT   LAB with  [facility]   [facility]    [address] [address]   [phone]           Please do not eat 10-12 hours before your appointment if you are coming in fasting for labs on lipids, cholesterol, or glucose (sugar). This does not apply to pregnant women. Water, hot tea and black coffee (with nothing added) are okay. Do not drink other fluids, diet soda or chew gum.            Apr 04, 2018  3:40 PM CDT   Return Visit with Chris Capellan MD   [facility]    [address]  [place] [address] [phone]              Who to contact     If you have questions or need follow up information about today's clinic visit or your schedule please contact [facility] directly at [phone].  Normal or non-critical lab and imaging results will be communicated to you by MyChart, letter or phone within 4 business days after the clinic has received the results. If you do not hear from us within 7 days, please contact the clinic through MyChart or phone. If you have a critical or abnormal lab result, we will notify you by phone as soon as possible.  Submit refill requests through [employer] or call your pharmacy and they will forward the refill request to us. Please allow 3 business days for your refill to be completed.          Additional Information About Your Visit         Yellow Chip Information     Y[employer] gives you secure access to your electronic health record. If you see a primary care provider, you can also send messages to your care team and make appointments. If you have questions, please call your primary care clinic.  If you do not have a primary care provider, please call [phone] and they will assist you.        Care EveryWhere ID     This is your Care EveryWhere ID. This could be used by other organizations to access your [place] medical records  [id number]        Your Vitals Were     Last Period                   03/04/2011            Blood Pressure from Last 3 Encounters:   01/03/18 144/82   12/12/17 132/86   11/14/17 158/90    Weight from Last 3 Encounters:   01/03/18 105.8 kg (233 lb 3.2 oz)   11/16/17 105.2 kg (232 lb)   11/14/17 105.2 kg (232 lb)              We Performed the Following     C OT ORTHOTICS/PROSTH MGMT &/TRAING SBSQ ENCTR, EA 15 MIN     HC OT EVAL, LOW COMPLEXITY     LUANN INITIAL EVAL REPORT     THERAPEUTIC EXERCISES        Primary Care Provider Office Phone # Fax #    Paula Alarcon [phone]        [place] [id number]        Equal Access to Services     [facility]: Hadii aad ku hadasho Soomaali, waaxda luqadaha, qaybta kaalmada adeegyada, waxay idiin hayaan amina dean lathomas denis. So [facility] [phone].    ATENCIÓN: Si habla español, tiene a murphy disposición servicios gratBusca Corpos de asistencia lingüística. Llame al [phone].    We comply with applicable federal civil rights laws and [place] laws. We do not discriminate on the basis of race, color, national origin, age, disability, sex, sexual orientation, or gender identity.            Thank you!     Thank you for choosing [facility] [place] [facility]  for your care. Our goal is always to provide you with excellent care. Hearing back from our patients is one way we can continue to improve our services. Please take a few minutes  to complete the written survey that you may receive in the mail after your visit with us. Thank you!             Your Updated Medication List - Protect others around you: Learn how to safely use, store and throw away your medicines at www.disposemymeds.org.          This list is accurate as of: 1/17/18  4:17 PM.  Always use your most recent med list.                   Brand Name Dispense Instructions for use Diagnosis    aspirin 81 MG tablet     30 tablet    Take 1 tablet (81 mg) by mouth daily    Type 2 diabetes mellitus without complication, without long-term current use of insulin (H)       beclomethasone 80 MCG/ACT Inhaler    QVAR    1 Inhaler    Inhale 2 puffs into the lungs daily    Mild persistent asthma without complication       blood glucose monitoring test strip    no brand specified    3 Month    Use to test blood sugar 1 times daily or as directed. One touch ultra test strips    DM type 2, goal A1c below 7       CALCIUM 1200 PO      Take  by mouth.        cetirizine 10 MG tablet    ZYRTEC ALLERGY    60 tablet    Take 1-2 tablets (10-20 mg) by mouth daily as needed for allergies    Itchy skin       CRANBERRY           DAILY MULTIVITAMIN PO      Take  by mouth.        FISH OIL           folic acid 1 MG tablet    FOLVITE    100 tablet    Take 1 tablet (1 mg) by mouth daily    Seronegative rheumatoid arthritis (H)       hydroxychloroquine 200 MG tablet    PLAQUENIL    180 tablet    Take 1 tablet (200 mg) by mouth 2 times daily    Seronegative rheumatoid arthritis (H)       hydrOXYzine 25 MG tablet    ATARAX    60 tablet    TAKE 1 TO 2 TABLETS BY MOUTH EVERY NIGHT AT BEDTIME AS NEEDED FOR ITCH    Itchy skin       ibuprofen 200 MG capsule      Take 200 mg by mouth every 4 hours as needed.        levalbuterol 45 MCG/ACT Inhaler    XOPENEX HFA    1 Inhaler    Inhale 2 puffs into the lungs every 4 hours as needed    Mild persistent asthma, uncomplicated       losartan 100 MG tablet    COZAAR    90 tablet    TAKE  1 TABLET (100 MG) BY MOUTH DAILY    Essential hypertension with goal blood pressure less than 140/90       metFORMIN 1000 MG tablet    GLUCOPHAGE    180 tablet    Take 1 tablet (1,000 mg) by mouth 2 times daily (with meals)    Type 2 diabetes mellitus without complication, without long-term current use of insulin (H)       methotrexate sodium 2.5 MG Tabs     96 tablet    Take 20 mg by mouth once a week . Take all 8 tablets on the same day of each week.    Seronegative rheumatoid arthritis (H)       metoprolol succinate 25 MG 24 hr tablet    TOPROL-XL    30 tablet    TAKE 1 TABLET(25 MG) BY MOUTH DAILY    Hypertension goal BP (blood pressure) < 140/90       order for DME     1 each    Class I Jobst compression stockings    Edema of both ankles       simvastatin 10 MG tablet    ZOCOR    90 tablet    Take 1 tablet (10 mg) by mouth At Bedtime    Hyperlipidemia LDL goal <100       tacrolimus 0.1 % ointment    PROTOPIC    60 g    Apply to the face daily, already failed desonide    AD (atopic dermatitis)       triamcinolone 55 MCG/ACT Inhaler    NASACORT     Spray 2 sprays into both nostrils daily    Seasonal allergic rhinitis       VITAMIN C PO      Take  by mouth.        VITAMIN D PO      Take  by mouth.

## 2018-01-17 NOTE — PROGRESS NOTES
Hand Therapy Initial Evaluation    Current Date:  1/17/2018    Subjective:  Jorge Abarca is a 57 year old right hand dominant female.    Diagnosis:   RA with left index trigger finger  DOI:  1/3/18 (therapy referral)    Patient reports symptoms of pain and stiffness/loss of motion of the left index finger which occurred due to gradual onset over the past 2 months. Since onset symptoms are gradually getting better.  Special tests:  none.  Previous treatment: Index cortisone injection 1/3/18, also 2-3 previous injections to left long finger trigger in past year.  General health as reported by patient is fair to good.  Pertinent medical history includes:rheumatoid arthritis, diabetes, overweight, high blood pressure, asthma, menopausal  Medical allergies:see list in EMR.  Surgical history: orthopedic: spine, foot, other: tonsils.  Medication history: pain, high blood pressure, acid reflux, cholesterol, asthma, allergy and diabetes.    Occupational Profile Information:  Current occupation is   Currently working in normal job without restrictions  Job Tasks: prolonged standing, lifting/carrying, computer work  Prior functional level:  independent-shared household chores  Barriers include:none  Mobility: No difficulty  Transportation: drives  Leisure activities/hobbies: Crocheting     Functional Outcome Measure:  Upper Extremity Functional Index  SCORE:   Column Totals: 78/80  (A lower score indicates greater disability.)    Objective:  ROM:  Index Finger  1/17/18   AROM(PROM) Right Left   MCP 0/90 0/90   PIP 0/120 0/105   DIP 0/55 0/55   SIMPSON 265 250     Strength:   (Measured in pounds)    1/17/18   Trials Right Left   1  2  3 63  52  53 37-  37-  34-   Average: 56 36     3 Pt Pinch  1/17/18   Trials Right Left   1  2  3 16  16  17 13  13  14   Average: 16 13     Stage of Stenosing Tenosynovitis (SST):  Stage 1:  Normal  Stage 2:  Uneven motion of tendon  Stage 3:  Triggering, clicking, catching  Stage 4:   Locking in extension or flexion; unlocked by active motion  Stage 5:  Locking in extension or flexion; unlocked by passive motion  Stage 6:  Finger locked in extension or flexion   1/17/18   Triggering of index finger Stage 2-3     Palpation:   0/17/18   A1 pulley index finger -     Sensation:  WNL throughout all nerve distributions; per patient report    Pain Report:  VAS(0-10) 1/17/18   At Rest: 0/10   With Use: 2-3/10   Location:  index finger  Pain Quality:  Tender  Frequency: intermittent    Pain is worst:  daytime  Exacerbated by:  Pressure  Relieved by:  Rest  Progression:  Gradually improving  Assessment:  Patient presents with symptoms consistent with diagnosis of index finger trigger, with conservative intervention.     Patient's limitations or Problem List includes:  Pain, Decreased ROM/motion, Increased edema, Weakness and Decreased  of the left index finger which interferes with the patient's ability to perform Work Tasks, Recreational Activities and Household Chores as compared to previous level of function.    Rehab Potential:  Excellent - Return to full activity, no limitations    Patient will benefit from skilled Occupational Therapy to increase ROM, flexibility, overall strength and  strength and decrease pain and edema to return to previous activity level and resume normal daily tasks and to reach their rehab potential.    Barriers to Learning:  No barrier    Communication Issues:  Patient appears to be able to clearly communicate and understand verbal and written communication and follow directions correctly.    Assessment of Occupational Performance:  1-3 Performance Deficits  Identified Performance Deficits: home establishment and management and work      Clinical Decision Making (Complexity): Low complexity    Treatment Explanation:  The following has been discussed with the patient:  RX ordered/plan of care  Anticipated outcomes  Possible risks and side effects    Plan:  Frequency:  1  X week, once daily  Duration:  for 6 weeks  Treatment Plan:    Modalities:  US and Paraffin  Therapeutic Exercise:  AROM, PROM, Tendon Gliding, Blocking, Isotonics and Isometrics  Manual Techniques:  Friction massage and decongestive  Orthotic Fabrication:  Finger based orthoses  Self Care:  Self Care Tasks and Diagnostic Education     Discharge Plan:  Achieve all LTG.  Independent in home treatment program.  Reach maximal therapeutic benefit.    Home Exercise Program:  Warmth for stiffness  Ice to A1 pulley/palm for inflammation  Decongestive and TFM to palm and A1 pulley  AROM fingers E/F, avoid triggering  PROM finger flexion  Index and long finger gutter orthoses sleeping    Next Visit:  Consider Oval 8 or ring orthosis day as needed to prevent triggering  Hold chart open for two months, if no contact is received from patient, discharge will occur at that time with this note serving as the discharge summary.

## 2018-01-22 ENCOUNTER — THERAPY VISIT (OUTPATIENT)
Dept: OCCUPATIONAL THERAPY | Facility: CLINIC | Age: 58
End: 2018-01-22
Payer: COMMERCIAL

## 2018-01-22 DIAGNOSIS — M79.644 PAIN OF FINGER OF RIGHT HAND: ICD-10-CM

## 2018-01-22 PROCEDURE — 97760 ORTHOTIC MGMT&TRAING 1ST ENC: CPT | Mod: GO | Performed by: OCCUPATIONAL THERAPIST

## 2018-01-22 PROCEDURE — 97110 THERAPEUTIC EXERCISES: CPT | Mod: GO | Performed by: OCCUPATIONAL THERAPIST

## 2018-01-22 NOTE — MR AVS SNAPSHOT
After Visit Summary   1/22/2018    Jorge Abarca    MRN: [id number]           Patient Information     Date Of Birth          1960        Visit Information        Provider Department      1/22/2018 4:00 PM Sof[facility] Vora [facility] [place] [facility]        Today's Diagnoses     Pain of finger of right hand           Follow-ups after your visit        Your next 10 appointments already scheduled     Apr 02, 2018  3:00 PM CDT   LAB with  LAB   [facility] ([facility])    [address] [address] [phone]           Please do not eat 10-12 hours before your appointment if you are coming in fasting for labs on lipids, cholesterol, or glucose (sugar). This does not apply to pregnant women. Water, hot tea and black coffee (with nothing added) are okay. Do not drink other fluids, diet soda or chew gum.            Apr 04, 2018  3:40 PM CDT   Return Visit with Chris Capellan MD   [facility] ([facility])    [address]  [place] [address] [phone]              Who to contact     If you have questions or need follow up information about today's clinic visit or your schedule please contact [facility] directly at [phone].  Normal or non-critical lab and imaging results will be communicated to you by [employer]eeks[employer], letter or phone within 4 business days after the clinic has received the results. If you do not hear from us within 7 days, please contact the clinic through [employer] or phone. If you have a critical or abnormal lab result, we will notify you by phone as soon as possible.  Submit refill requests through memory lane syndications or call your pharmacy and they will forward the refill request to us. Please allow 3 business days for your refill to be completed.          Additional Information About Your Visit        [employer] [facility] [facility] Information     memory lane syndications gives  you secure access to your electronic health record. If you see a primary care provider, you can also send messages to your care team and make appointments. If you have questions, please call your primary care clinic.  If you do not have a primary care provider, please call [phone] and they will assist you.        Care EveryWhere ID     This is your Care EveryWhere ID. This could be used by other organizations to access your [place] medical records  [id number]        Your Vitals Were     Last Period                   03/04/2011            Blood Pressure from Last 3 Encounters:   01/03/18 144/82   12/12/17 132/86   11/14/17 158/90    Weight from Last 3 Encounters:   01/03/18 105.8 kg (233 lb 3.2 oz)   11/16/17 105.2 kg (232 lb)   11/14/17 105.2 kg (232 lb)              We Performed the Following     ORTHOTIC MGMT AND TRAINING, EACH 15 MIN     THERAPEUTIC EXERCISES        Primary Care Provider Office Phone # Fax #    Paula Alarcon [phone]       [address] [address]        Equal Access to Services     [facility]: Hadii aad ku hadasho Soomaali, waaxda luqadaha, qaybta kaalmada adejustynayacastillo, kodi granados . So [facility] [phone].    ATENCIÓN: Si habla español, tiene a murphy disposición servicios gratzemos de asistencia lingüística. Yuri al [phone].    We comply with applicable federal civil rights laws and [place] laws. We do not discriminate on the basis of race, color, national origin, age, disability, sex, sexual orientation, or gender identity.            Thank you!     Thank you for choosing [facility]  for your care. Our goal is always to provide you with excellent care. Hearing back from our patients is one way we can continue to improve our services. Please take a few minutes to complete the written survey that you may receive in the mail after your visit with us. Thank you!              Your Updated Medication List - Protect others around you: Learn how to safely use, store and throw away your medicines at www.disposemymeds.org.          This list is accurate as of: 1/22/18  4:22 PM.  Always use your most recent med list.                   Brand Name Dispense Instructions for use Diagnosis    aspirin 81 MG tablet     30 tablet    Take 1 tablet (81 mg) by mouth daily    Type 2 diabetes mellitus without complication, without long-term current use of insulin (H)       beclomethasone 80 MCG/ACT Inhaler    QVAR    1 Inhaler    Inhale 2 puffs into the lungs daily    Mild persistent asthma without complication       blood glucose monitoring test strip    no brand specified    3 Month    Use to test blood sugar 1 times daily or as directed. One touch ultra test strips    DM type 2, goal A1c below 7       CALCIUM 1200 PO      Take  by mouth.        cetirizine 10 MG tablet    ZYRTEC ALLERGY    60 tablet    Take 1-2 tablets (10-20 mg) by mouth daily as needed for allergies    Itchy skin       CRANBERRY           DAILY MULTIVITAMIN PO      Take  by mouth.        FISH OIL           folic acid 1 MG tablet    FOLVITE    100 tablet    Take 1 tablet (1 mg) by mouth daily    Seronegative rheumatoid arthritis (H)       hydroxychloroquine 200 MG tablet    PLAQUENIL    180 tablet    Take 1 tablet (200 mg) by mouth 2 times daily    Seronegative rheumatoid arthritis (H)       hydrOXYzine 25 MG tablet    ATARAX    60 tablet    TAKE 1 TO 2 TABLETS BY MOUTH EVERY NIGHT AT BEDTIME AS NEEDED FOR ITCH    Itchy skin       ibuprofen 200 MG capsule      Take 200 mg by mouth every 4 hours as needed.        levalbuterol 45 MCG/ACT Inhaler    XOPENEX HFA    1 Inhaler    Inhale 2 puffs into the lungs every 4 hours as needed    Mild persistent asthma, uncomplicated       losartan 100 MG tablet    COZAAR    90 tablet    TAKE 1 TABLET (100 MG) BY MOUTH DAILY    Essential hypertension with goal blood pressure less than 140/90        metFORMIN 1000 MG tablet    GLUCOPHAGE    180 tablet    Take 1 tablet (1,000 mg) by mouth 2 times daily (with meals)    Type 2 diabetes mellitus without complication, without long-term current use of insulin (H)       methotrexate sodium 2.5 MG Tabs     96 tablet    Take 20 mg by mouth once a week . Take all 8 tablets on the same day of each week.    Seronegative rheumatoid arthritis (H)       metoprolol succinate 25 MG 24 hr tablet    TOPROL-XL    30 tablet    TAKE 1 TABLET(25 MG) BY MOUTH DAILY    Hypertension goal BP (blood pressure) < 140/90       order for DME     1 each    Class I Jobst compression stockings    Edema of both ankles       simvastatin 10 MG tablet    ZOCOR    90 tablet    Take 1 tablet (10 mg) by mouth At Bedtime    Hyperlipidemia LDL goal <100       tacrolimus 0.1 % ointment    PROTOPIC    60 g    Apply to the face daily, already failed desonide    AD (atopic dermatitis)       triamcinolone 55 MCG/ACT Inhaler    NASACORT     Spray 2 sprays into both nostrils daily    Seasonal allergic rhinitis       VITAMIN C PO      Take  by mouth.        VITAMIN D PO      Take  by mouth.

## 2018-01-22 NOTE — PROGRESS NOTES
SOAP note objective information for 1/22/2018:    Stage of Stenosing Tenosynovitis (SST):  Stage 1:  Normal  Stage 2:  Uneven motion of tendon  Stage 3:  Triggering, clicking, catching  Stage 4:  Locking in extension or flexion; unlocked by active motion  Stage 5:  Locking in extension or flexion; unlocked by passive motion  Stage 6:  Finger locked in extension or flexion   1/17/18 1/22/18   Triggering of index finger Stage 2-3 Stage 2-3   Triggering of long finger  Stage 1-2     Palpation:   0/17/18 1/22/18   A1 pulley index finger - +   A1 pulley long finger  -     Sensation:  WNL throughout all nerve distributions; per patient report    Pain Report:  VAS(0-10) 1/17/18 1/22/18   At Rest: 0/10    With Use: 2-3/10 3/10   Location:  index finger    Home Exercise Program:  Warmth for stiffness  Ice to A1 pulley/palm for inflammation  Decongestive and TFM to palm and A1 pulley  AROM fingers E/F, avoid triggering  PROM finger flexion, avoid triggering  Oval 8 or ring orthoses index and long fingers as needed day to prevent triggering  Index and long finger gutter orthoses sleeping    Next Visit:  Assess response to Oval 8 or ring orthosis to prevent triggering  Hold chart open for one month, if no contact is received from patient, discharge will occur at that time with this note serving as the discharge summary.    Please refer to the daily flowsheet for treatment today, total treatment time and time spent performing 1:1 timed codes.

## 2018-01-29 ENCOUNTER — MYC MEDICAL ADVICE (OUTPATIENT)
Dept: ALLERGY | Facility: OTHER | Age: 58
End: 2018-01-29

## 2018-01-29 DIAGNOSIS — R06.02 SOB (SHORTNESS OF BREATH): Primary | ICD-10-CM

## 2018-01-30 ENCOUNTER — TELEPHONE (OUTPATIENT)
Dept: ALLERGY | Facility: OTHER | Age: 58
End: 2018-01-30

## 2018-01-30 DIAGNOSIS — R06.02 SOB (SHORTNESS OF BREATH): Primary | ICD-10-CM

## 2018-01-30 NOTE — TELEPHONE ENCOUNTER
Reason for Call:  Medication or medication refill:    Do you use a [facility]?  Name of the pharmacy and phone number for the current request:  [facility] in [place] [phone]    Name of the medication requested:     Other request: alternative to arnuity. It is too expensive. Please call pharmacy. Ins will cover asthmanex, flovent, pulmicort or qvar.    Can we leave a detailed message on this number? YES    Phone number patient can be reached at: Home number on file [phone] (home)    Best Time: any    Call taken on 1/30/2018 at 12:58 PM by Brittanie Torres

## 2018-01-30 NOTE — TELEPHONE ENCOUNTER
Prescription for Qvar 80mcg sent to pharmacy by Dr. Fox today. Will close this encounter.    Barbara Salgado CMA

## 2018-02-01 ENCOUNTER — MYC MEDICAL ADVICE (OUTPATIENT)
Dept: ALLERGY | Facility: OTHER | Age: 58
End: 2018-02-01

## 2018-02-01 DIAGNOSIS — R06.02 SOB (SHORTNESS OF BREATH): Primary | ICD-10-CM

## 2018-02-01 NOTE — TELEPHONE ENCOUNTER
Can we please figure out what is actually covered for this patient so I do not have to keep sending scripts that are not covered. I received a note on 1/30 that insurance would cover asmanex, flovent, pulmicort and qvar. She did not think qvar was effective. Flovent from previous notes was actually stopped because it was not covered. I will try asmanex but if this is not covered then we need to call insurance and figure this out. Thanks. Please call and inform patient

## 2018-02-07 ENCOUNTER — TRANSFERRED RECORDS (OUTPATIENT)
Dept: HEALTH INFORMATION MANAGEMENT | Facility: CLINIC | Age: 58
End: 2018-02-07

## 2018-03-25 PROBLEM — M79.644 PAIN OF FINGER OF RIGHT HAND: Status: RESOLVED | Noted: 2018-01-17 | Resolved: 2018-03-25

## 2018-03-25 PROBLEM — M65.30 TRIGGER FINGER OF RIGHT HAND: Status: RESOLVED | Noted: 2018-01-17 | Resolved: 2018-03-25

## 2018-04-02 DIAGNOSIS — M06.00 SERONEGATIVE RHEUMATOID ARTHRITIS (H): ICD-10-CM

## 2018-04-02 LAB
ALBUMIN SERPL-MCNC: 3.9 G/DL (ref 3.4–5)
ALP SERPL-CCNC: 82 U/L (ref 40–150)
ALT SERPL W P-5'-P-CCNC: 36 U/L (ref 0–50)
AST SERPL W P-5'-P-CCNC: 36 U/L (ref 0–45)
BASOPHILS # BLD AUTO: 0 10E9/L (ref 0–0.2)
BASOPHILS NFR BLD AUTO: 0.3 %
BILIRUB DIRECT SERPL-MCNC: <0.1 MG/DL (ref 0–0.2)
BILIRUB SERPL-MCNC: 0.3 MG/DL (ref 0.2–1.3)
CREAT SERPL-MCNC: 0.96 MG/DL (ref 0.52–1.04)
CRP SERPL-MCNC: <2.9 MG/L (ref 0–8)
DIFFERENTIAL METHOD BLD: ABNORMAL
EOSINOPHIL # BLD AUTO: 0.5 10E9/L (ref 0–0.7)
EOSINOPHIL NFR BLD AUTO: 9.1 %
ERYTHROCYTE [DISTWIDTH] IN BLOOD BY AUTOMATED COUNT: 17 % (ref 10–15)
ERYTHROCYTE [SEDIMENTATION RATE] IN BLOOD BY WESTERGREN METHOD: 12 MM/H (ref 0–30)
GFR SERPL CREATININE-BSD FRML MDRD: 59 ML/MIN/1.7M2
HCT VFR BLD AUTO: 37.2 % (ref 35–47)
HGB BLD-MCNC: 12.2 G/DL (ref 11.7–15.7)
LYMPHOCYTES # BLD AUTO: 1.2 10E9/L (ref 0.8–5.3)
LYMPHOCYTES NFR BLD AUTO: 19.7 %
MCH RBC QN AUTO: 30 PG (ref 26.5–33)
MCHC RBC AUTO-ENTMCNC: 32.8 G/DL (ref 31.5–36.5)
MCV RBC AUTO: 92 FL (ref 78–100)
MONOCYTES # BLD AUTO: 0.5 10E9/L (ref 0–1.3)
MONOCYTES NFR BLD AUTO: 8.2 %
NEUTROPHILS # BLD AUTO: 3.7 10E9/L (ref 1.6–8.3)
NEUTROPHILS NFR BLD AUTO: 62.7 %
PLATELET # BLD AUTO: 296 10E9/L (ref 150–450)
PROT SERPL-MCNC: 7.4 G/DL (ref 6.8–8.8)
RBC # BLD AUTO: 4.06 10E12/L (ref 3.8–5.2)
WBC # BLD AUTO: 6 10E9/L (ref 4–11)

## 2018-04-02 PROCEDURE — 86140 C-REACTIVE PROTEIN: CPT | Performed by: INTERNAL MEDICINE

## 2018-04-02 PROCEDURE — 85652 RBC SED RATE AUTOMATED: CPT | Performed by: INTERNAL MEDICINE

## 2018-04-02 PROCEDURE — 36415 COLL VENOUS BLD VENIPUNCTURE: CPT | Performed by: INTERNAL MEDICINE

## 2018-04-02 PROCEDURE — 82565 ASSAY OF CREATININE: CPT | Performed by: INTERNAL MEDICINE

## 2018-04-02 PROCEDURE — 80076 HEPATIC FUNCTION PANEL: CPT | Performed by: INTERNAL MEDICINE

## 2018-04-02 PROCEDURE — 85025 COMPLETE CBC W/AUTO DIFF WBC: CPT | Performed by: INTERNAL MEDICINE

## 2018-04-04 ENCOUNTER — OFFICE VISIT (OUTPATIENT)
Dept: RHEUMATOLOGY | Facility: CLINIC | Age: 58
End: 2018-04-04
Payer: COMMERCIAL

## 2018-04-04 VITALS
HEIGHT: 66 IN | RESPIRATION RATE: 16 BRPM | HEART RATE: 88 BPM | WEIGHT: 237.2 LBS | BODY MASS INDEX: 38.12 KG/M2 | DIASTOLIC BLOOD PRESSURE: 82 MMHG | OXYGEN SATURATION: 97 % | SYSTOLIC BLOOD PRESSURE: 132 MMHG

## 2018-04-04 DIAGNOSIS — M06.00 SERONEGATIVE RHEUMATOID ARTHRITIS (H): ICD-10-CM

## 2018-04-04 PROCEDURE — 99213 OFFICE O/P EST LOW 20 MIN: CPT | Performed by: INTERNAL MEDICINE

## 2018-04-04 RX ORDER — METHOTREXATE 2.5 MG/1
20 TABLET ORAL WEEKLY
Qty: 96 TABLET | Refills: 1 | Status: SHIPPED | OUTPATIENT
Start: 2018-04-04 | End: 2018-07-26

## 2018-04-04 RX ORDER — FOLIC ACID 1 MG/1
1 TABLET ORAL DAILY
Qty: 100 TABLET | Refills: 3 | Status: SHIPPED | OUTPATIENT
Start: 2018-04-04 | End: 2018-09-04

## 2018-04-04 NOTE — PROGRESS NOTES
Rheumatology Clinic Visit      Jorge Abarca MRN# [id number]   YOB: 1960 Age: 57 year old      Date of visit: 4/04/18   PCP: Dr. Coy Varela  Ophthalmology: [facility] in [place]     Chief Complaint   Patient presents with:  Arthritis: Patient states she is doing good except her left middle finger has been acting up      Assessment and Plan   1. Seronegative nonerosive rheumatoid arthritis (RF negative, CCP negative): Symptoms began May 2015 and progressively worsened; initially with symmetric synovitis of the PIPs and MCPs and morning stiffness > 1 hour; steroid responsive. Currently on MTX 20mg PO weekly, folic acid 1mg daily, and HCQ 400mg daily.  Doing well today.  - Continue methotrexate 20mg once weekly  - Continue hydroxychloroquine 400mg daily (ophthalmology exam last on 1/16/2018 by Dr. Randy Ng at [facility] in [place])  - Continue folic acid 1mg daily  - Labs in 3 months: CBC, Creatinine, Hepatic Panel, ESR, CRP              Rapid 3, cumulative scores                      04/04/2018: 1    (MTX 20mg wkly and HCQ 400mg daily)                      01/03/2018: 1    (MTX 20mg wkly and HCQ 400mg daily)                      06/28/2017: 1    (MTX 20mg wkly and HCQ 400mg daily)                      12/21/2016: 1    (MTX 20mg wkly and HCQ 400mg daily)                      09/07/2016: 1.5 (MTX 20mg wkly and HCQ 400mg daily)    2. Left 3rd digit trigger finger: Steroid injection on 11/17/2015 with resolution for several months, then again in September 2016 with resolution of it again.  Mild reported symptoms but not active right now.     3. Left second digit trigger finger: resolved with steroid injection and hand therapy exercises.     4. History of DVT: reportedly associated with birth control; no recurrence reported    5. Bone Health: 11/2015 Vitamin D level normal.  Normal DEXA in 2011.     Ms. Abarca verbalized agreement with and understanding of the rational for the  diagnosis and treatment plan.  All questions were answered to best of my ability and the patient's satisfaction. Ms. Abarca was advised to contact the clinic with any questions that may arise after the clinic visit.      Thank you for involving me in the care of the patient    Return to clinic: 3 months    HPI   Jorge Abarca is a 57 year old female with a medical history significant for GERD, diabetes, hypertension, hyperlipidemia, lumbar degenerative disc disease s/p L4-L5 microdiskectomy in 2013, left first toe fracture s/p nonsurgical treatment, allergic rhinitis (receiving allergy shots), asthma, and history of DVT who presents for f/u of seronegative rheumatoid arthritis.     Today, Ms. Abarca reports that she is doing great.  Occasional triggering of the left third finger but it is not bothering her now.  She is not doing the hand therapy exercises that she was taught.  Morning stiffness for no more than 10-20 minutes.  She is tolerating her medications well.      Denies fevers, chills, nausea, vomiting, diarrhea. Chronic on and off constipation. No abdominal pain. No chest pain/pressure, palpitations, or shortness of breath. No oral or nasal sores. No neck pain. No LE swelling. No dry mouth. Dry eyes doing well with infrequent use of artificial tears.  No eye pain.    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   SKIN: See history of present illness  HEENT:  No epistaxis. No oral or nasal ulcers.  CV: No chest pain, pressure, palpitations, or dyspnea on exertion.  PULM: No SOB, wheeze, cough.  GI:  No nausea, vomiting, diarrhea. See history of present illness. No blood in stool. No abdominal pain.    : No blood in urine.  MSK: See HPI.  NEURO: No numbness, tingling, or weakness.  EXT: No LE swelling    Active Problem List     Patient Active Problem List   Diagnosis     Hypertension goal BP (blood pressure) < 140/90      Allergy to mold spores     House dust mite allergy     Allergic rhinitis due to animal dander     Need for desensitization to allergens     Diagnostic skin and sensitization tests     Hyperlipidemia LDL goal <100     Degenerative disc disease, lumbar     Lumbar disc herniation     History of DVT (deep vein thrombosis)     Mild persistent asthma without complication     Seronegative rheumatoid arthritis (H)     High risk medications (not anticoagulants) long-term use     Trigger finger, left middle finger     Allergic rhinitis due to pollen     Type 2 diabetes mellitus without complication, without long-term current use of insulin (H)     Seasonal allergic rhinitis, unspecified allergic rhinitis trigger     Morbid obesity due to excess calories (H)     Grief     Advanced directives, counseling/discussion     Past Medical History     Past Medical History:   Diagnosis Date     Allergic rhinitis due to animal dander      Allergy to mold spores     7/03 skin tests per [facility]: pos. for cat/dog/CR/DM/M/T/G/W     Diabetes (H)      Diagnostic skin and sensitization tests 7/03 skin tests per [facility]: pos. for cat/dog/CR/DM/M/T/G/W     DVT (deep venous thrombosis) (H)      Eczema     sees [facility]     GERD (gastroesophageal reflux disease)      High cholesterol     occ.     House dust mite allergy      HTN (hypertension)      Indigestion     uses OTC Zantac prn     Mild persistent asthma      Morbid obesity due to excess calories (H)      Need for desensitization to allergens 9/03 started at [facility]. for: cat/dog/DM/M/T/G/W    start IT at [facility]: about 1/12      PONV (postoperative nausea and vomiting)      RA (rheumatoid arthritis) (H)      Seasonal allergic rhinitis      Past Surgical History     Past Surgical History:   Procedure Laterality Date     BACK SURGERY  2009     [facility] AFF SURGICAL PATHOLOGY  1988    left foot     DISCECTOMY LUMBAR POSTERIOR MICROSCOPIC ONE LEVEL  3/18/2013    Procedure: DISCECTOMY LUMBAR POSTERIOR  MICROSCOPIC ONE LEVEL;  Left Lumbar 4-5 Micro Discectomy;  Surgeon: Dallas Jensen MD;  Location: [facility]     TONSILLECTOMY & ADENOIDECTOMY  age 3     Allergy     Allergies   Allergen Reactions     Aleve [Naproxen Sodium] Hives     Hydralazine      Nexium [Esomeprazole Magnesium Trihydrate] Hives     HIVES     Shellfish Allergy Nausea     Sulfa Drugs      Stomach upset     Amlodipine Besylate Rash     Hctz Rash     Lisinopril Rash     Current Medication List     Current Outpatient Prescriptions   Medication Sig     mometasone (ASMANEX 60 METERED DOSES) 220 MCG/INH Inhaler Inhale 1 puff into the lungs 2 times daily     methotrexate sodium 2.5 MG TABS Take 20 mg by mouth once a week . Take all 8 tablets on the same day of each week.     hydroxychloroquine (PLAQUENIL) 200 MG tablet Take 1 tablet (200 mg) by mouth 2 times daily     metoprolol (TOPROL-XL) 25 MG 24 hr tablet TAKE 1 TABLET(25 MG) BY MOUTH DAILY     losartan (COZAAR) 100 MG tablet TAKE 1 TABLET (100 MG) BY MOUTH DAILY     metFORMIN (GLUCOPHAGE) 1000 MG tablet Take 1 tablet (1,000 mg) by mouth 2 times daily (with meals)     hydrOXYzine (ATARAX) 25 MG tablet TAKE 1 TO 2 TABLETS BY MOUTH EVERY NIGHT AT BEDTIME AS NEEDED FOR ITCH     cetirizine (ZYRTEC ALLERGY) 10 MG tablet Take 1-2 tablets (10-20 mg) by mouth daily as needed for allergies     simvastatin (ZOCOR) 10 MG tablet Take 1 tablet (10 mg) by mouth At Bedtime     folic acid (FOLVITE) 1 MG tablet Take 1 tablet (1 mg) by mouth daily     aspirin 81 MG tablet Take 1 tablet (81 mg) by mouth daily     tacrolimus (PROTOPIC) 0.1 % ointment Apply to the face daily, already failed desonide     triamcinolone (NASACORT) 55 MCG/ACT nasal inhaler Spray 2 sprays into both nostrils daily     levalbuterol (XOPENEX HFA) 45 MCG/ACT inhaler Inhale 2 puffs into the lungs every 4 hours as needed     CRANBERRY      FISH OIL      Cholecalciferol (VITAMIN D PO) Take  by mouth.     Ascorbic Acid (VITAMIN C PO) Take  " by mouth.     Multiple Vitamin (DAILY MULTIVITAMIN PO) Take  by mouth.     Calcium Carbonate-Vit D-Min (CALCIUM 1200 PO) Take  by mouth.     ibuprofen 200 MG capsule Take 200 mg by mouth every 4 hours as needed.     fluticasone furoate (ARNUITY ELLIPTA) 200 MCG/ACT inhalation powder Inhale 1 puff into the lungs daily (Patient not taking: Reported on 4/4/2018)     order for DME Class I Jobst compression stockings     blood glucose monitoring (NO BRAND SPECIFIED) test strip Use to test blood sugar 1 times daily or as directed.  One touch ultra test strips     [DISCONTINUED] amLODIPine (NORVASC) 5 MG tablet Take 1 tablet (5 mg) by mouth daily     No current facility-administered medications for this visit.      Social History   See HPI    Family History     Family History   Problem Relation Age of Onset     Cardiovascular Sister      atrial fibrillation     CEREBROVASCULAR DISEASE Brother      carotid stenosis     CANCER Father      lung     DIABETES Mother      Breast Cancer Maternal Grandmother      also cousin on this side     Breast Cancer Paternal Aunt      C.A.D. No family hx of      Autoimmune Disease No family hx of      Physical Exam     Temp Readings from Last 3 Encounters:   01/03/18 97.7  F (36.5  C) (Oral)   11/14/17 97.7  F (36.5  C) (Oral)   10/26/17 97.6  F (36.4  C) (Oral)     BP Readings from Last 5 Encounters:   04/04/18 132/82   01/03/18 144/82   12/12/17 132/86   11/14/17 158/90   10/26/17 160/82     Pulse Readings from Last 1 Encounters:   04/04/18 88     Resp Readings from Last 1 Encounters:   04/04/18 16     Estimated body mass index is 38.29 kg/(m^2) as calculated from the following:    Height as of this encounter: 1.676 m (5' 6\").    Weight as of this encounter: 107.6 kg (237 lb 3.2 oz).    GEN: NAD, overweight  HEENT: MMM. No oral lesions. Anicteric, non-injected sclera.  CV: S1, S2. RRR. No m/r/g.  PULM: CTA bilaterally. No w/c.  MSK: Hands, wrists, elbows, and shoulders without swelling or " tenderness to palpation. Bump on the flexor tendon of the left third finger that moves with flexion/extension but does not cause triggering.  Hips nontender to direct palpation. Knees, ankles, and feet without swelling or tenderness to palpation. Negative MTP squeeze.   NEURO: UE and LE strengths 5/5 and equal bilaterally.   SKIN: No rash  EXT: No LE edema  PSYCH: Alert. Appropriate.    Labs   RF/CCP  Recent Labs   Lab Test  11/10/15   0918  10/26/15   0850   CCPABY  <20  Interpretation:  Negative     --    RHF   --   <20     CBC  Recent Labs   Lab Test  04/02/18   1333  12/19/17   0914  09/27/17   1519   WBC  6.0  4.4  6.6   RBC  4.06  3.95  3.41*   HGB  12.2  11.8  10.7*   HCT  37.2  35.6  32.3*   MCV  92  90  95   RDW  17.0*  15.7*  15.2*   PLT  296  310  323   MCH  30.0  29.9  31.4   MCHC  32.8  33.1  33.1   NEUTROPHIL  62.7  59.2  62.3   LYMPH  19.7  20.5  20.1   MONOCYTE  8.2  6.9  9.5   EOSINOPHIL  9.1  12.9  7.6   BASOPHIL  0.3  0.5  0.5   ANEU  3.7  2.6  4.1   ALYM  1.2  0.9  1.3   KOKO  0.5  0.3  0.6   AEOS  0.5  0.6  0.5   ABAS  0.0  0.0  0.0     CMP  Recent Labs   Lab Test  04/02/18   1333  12/19/17   0914  09/27/17   1519  06/28/17   1221   11/23/16   0917   10/26/15   0850   NA   --    --    --   143   --   141   --   141   POTASSIUM   --    --    --   4.3   --   4.3   --   3.7   CHLORIDE   --    --    --   105   --   103   --   105   CO2   --    --    --   29   --   30   --   25   ANIONGAP   --    --    --   9   --   8   --   11   GLC   --    --    --   87   --   97   --   93   BUN   --    --    --   16   --   16   --   22   CR  0.96  0.78  0.87  0.99   < >  0.81   < >  0.82   GFRESTIMATED  59*  76  67  57*   < >  72   < >  71   GFRESTBLACK  72  >90  81  69   < >  88   < >  86   OG   --    --    --   9.9   --   9.5   --   10.0   BILITOTAL  0.3  0.5  0.3   --    < >   --    < >  0.3   ALBUMIN  3.9  3.8  3.9   --    < >   --    < >  4.1   PROTTOTAL  7.4  7.3  7.1   --    < >   --    < >  7.4   ALKPHOS   82  74  80   --    < >   --    < >  79   AST  36  32  27   --    < >   --    < >  25   ALT  36  34  37   --    < >   --    < >  30    < > = values in this interval not displayed.     Calcium/VitaminD  Recent Labs   Lab Test  06/28/17   1221  11/23/16   0917  11/10/15   0918  10/26/15   0850   02/12/13   0722  12/20/11   0829   OG  9.9  9.5   --   10.0   < >  9.7  10.1   D3VIT   --    --    --    --    --    --   26   VITDT   --    --   50   --    --   23*   --     < > = values in this interval not displayed.     ESR/CRP  Recent Labs   Lab Test  04/02/18   1333  12/19/17   0914  09/27/17   1519   SED  12  11  13   CRP  <2.9  3.1  <2.9     Hepatitis B  Recent Labs   Lab Test  12/21/15   0844   AUSAB  0.12   HBCAB  Nonreactive   HEPBANG  Nonreactive     Hepatitis C  Recent Labs   Lab Test  12/21/15   0844   HCVAB  Nonreactive   Assay performance characteristics have not been established for newborns,   infants, and children       HIV Screening  Recent Labs   Lab Test  12/21/15   0844   HIAGAB  Nonreactive   HIV-1 p24 Ag & HIV-1/HIV-2 Ab Not Detected       Immunization History     Immunization History   Administered Date(s) Administered     Pneumo Conj 13-V (2010&after) 06/01/2016     Pneumococcal 23 valent 09/07/2016     TD (ADULT, 7+) 12/06/1995, 12/06/2006     TDAP Vaccine (Adacel) 08/16/2017          Chart documentation done in part with Dragon Voice recognition Software. Although reviewed after completion, some word and grammatical error may remain.    Chris Capellan MD

## 2018-04-04 NOTE — PATIENT INSTRUCTIONS
Rheumatology    Dr. Chris Capellan         [facility]   (Monday)  [address]  [place] [address]       [facility]   (Tuesday)  [address]  [place] [address]    [facility]   (Wed., Thurs., and Friday)  [address] [place] [address]    Phone number: [phone]  Thank you for choosing [facility].  Patricia Rizzo CMA

## 2018-04-04 NOTE — NURSING NOTE
"Chief Complaint   Patient presents with     Arthritis     Patient states she is doing good except her left middle finger has been acting up       Initial /82  Pulse 88  Resp 16  Ht 1.676 m (5' 6\")  Wt 107.6 kg (237 lb 3.2 oz)  LMP 03/04/2011  SpO2 97%  BMI 38.29 kg/m2 Estimated body mass index is 38.29 kg/(m^2) as calculated from the following:    Height as of this encounter: 1.676 m (5' 6\").    Weight as of this encounter: 107.6 kg (237 lb 3.2 oz).  BP completed using cuff size: regular         RAPID3 (0-30) Cumulative Score  1.0          RAPID3 Weighted Score (divide #4 by 3 and that is the weighted score)  0.33         "

## 2018-04-04 NOTE — MR AVS SNAPSHOT
After Visit Summary   4/4/2018    Jorge Abarca    MRN: [id number]           Patient Information     Date Of Birth          1960        Visit Information        Provider Department      4/4/2018 3:40 PM Chris Capellan MD [facility]        Today's Diagnoses     Seronegative rheumatoid arthritis (H)          Care Instructions    Rheumatology    Dr. Chris [facility]   (Monday)  [address]  [place] [address]       [facility]   (Tuesday)  [address] [place] [address]    [facility]   (Wed., Thurs., and Friday)  [address] [facility] [place] [address]    Phone number: [phone]  Thank you for choosing [facility].  Patricia Rizzo CMA            Follow-ups after your visit        Your next 10 appointments already scheduled     Jul 23, 2018  3:00 PM CDT   LAB with  LAB   [facility] ([facility])    [address] [facility] [id number] [phone]           Please do not eat 10-12 hours before your appointment if you are coming in fasting for labs on lipids, cholesterol, or glucose (sugar). This does not apply to pregnant women. Water, hot tea and black coffee (with nothing added) are okay. Do not drink other fluids, diet soda or chew gum.            Jul 26, 2018  3:20 PM CDT   Return Visit with Chris Capellan MD   [facility] ([facility])    [address] [facility] [address] [phone]              Future tests that were ordered for you today     Open Future Orders        Priority Expected Expires Ordered    Erythrocyte sedimentation rate auto Routine 6/29/2018 7/18/2018 4/4/2018    Hepatic panel Routine 6/29/2018 7/18/2018 4/4/2018    Creatinine Routine 6/29/2018 7/18/2018 4/4/2018    CBC with platelets differential Routine 6/29/2018 7/18/2018 4/4/2018    CRP inflammation Routine 6/29/2018 7/18/2018 4/4/2018            Who to contact     If you  "have questions or need follow up information about today's clinic visit or your schedule please contact [facility] directly at [phone].  Normal or non-critical lab and imaging results will be communicated to you by MyChart, letter or phone within 4 business days after the clinic has received the results. If you do not hear from us within 7 days, please contact the clinic through [employer] or phone. If you have a critical or abnormal lab result, we will notify you by phone as soon as possible.  Submit refill requests through [employer] [facility] or call your pharmacy and they will forward the refill request to us. Please allow 3 business days for your refill to be completed.          Additional Information About Your Visit        [employer] App Information     [employer] [facility] gives you secure access to your electronic health record. If you see a primary care provider, you can also send messages to your care team and make appointments. If you have questions, please call your primary care clinic.  If you do not have a primary care provider, please call [phone] and they will assist you.        Care EveryWhere ID     This is your Care EveryWhere ID. This could be used by other organizations to access your [place] medical records  [id number]        Your Vitals Were     Pulse Respirations Height Last Period Pulse Oximetry BMI (Body Mass Index)    88 16 1.676 m (5' 6\") 03/04/2011 97% 38.29 kg/m2       Blood Pressure from Last 3 Encounters:   04/04/18 132/82   01/03/18 144/82   12/12/17 132/86    Weight from Last 3 Encounters:   04/04/18 107.6 kg (237 lb 3.2 oz)   01/03/18 105.8 kg (233 lb 3.2 oz)   11/16/17 105.2 kg (232 lb)                 Where to get your medicines      These medications were sent to [id number] - [place] - [address]  [address] [place] [address]    Hours:  Test fax successful 12/11/02  [facility] Phone:  [phone]     folic acid 1 MG " tablet    methotrexate sodium 2.5 MG Tabs          Primary Care Provider Office Phone # Fax #    Paula Alarcon [phone] [address] [facility] [address]        Equal Access to Services     CUCO AVALOS : Hadii aad ku hadanastacioo Soomaali, waaxda luqadaha, qaybta kaalmada adeegyada, waxskye breenn adejustyna dean laMosescarrol denis. So [facility] [phone].    ATENCIÓN: Si habla español, tiene a murphy disposición servicios gratuitos de asistencia lingüística. Llame al [phone].    We comply with applicable federal civil rights laws and [place] laws. We do not discriminate on the basis of race, color, national origin, age, disability, sex, sexual orientation, or gender identity.            Thank you!     Thank you for choosing [facility]  for your care. Our goal is always to provide you with excellent care. Hearing back from our patients is one way we can continue to improve our services. Please take a few minutes to complete the written survey that you may receive in the mail after your visit with us. Thank you!             Your Updated Medication List - Protect others around you: Learn how to safely use, store and throw away your medicines at www.disposemymeds.org.          This list is accurate as of 4/4/18  4:04 PM.  Always use your most recent med list.                   Brand Name Dispense Instructions for use Diagnosis    aspirin 81 MG tablet     30 tablet    Take 1 tablet (81 mg) by mouth daily    Type 2 diabetes mellitus without complication, without long-term current use of insulin (H)       blood glucose monitoring test strip    no brand specified    3 Month    Use to test blood sugar 1 times daily or as directed. One touch ultra test strips    DM type 2, goal A1c below 7       CALCIUM 1200 PO      Take  by mouth.        cetirizine 10 MG tablet    ZYRTEC ALLERGY    60 tablet    Take 1-2 tablets (10-20 mg) by mouth daily as needed for allergies    Itchy skin        CRANBERRY           DAILY MULTIVITAMIN PO      Take  by mouth.        FISH OIL           fluticasone furoate 200 MCG/ACT inhalation powder    ARNUITY ELLIPTA    1 Inhaler    Inhale 1 puff into the lungs daily    SOB (shortness of breath)       folic acid 1 MG tablet    FOLVITE    100 tablet    Take 1 tablet (1 mg) by mouth daily    Seronegative rheumatoid arthritis (H)       hydroxychloroquine 200 MG tablet    PLAQUENIL    180 tablet    Take 1 tablet (200 mg) by mouth 2 times daily    Seronegative rheumatoid arthritis (H)       hydrOXYzine 25 MG tablet    ATARAX    60 tablet    TAKE 1 TO 2 TABLETS BY MOUTH EVERY NIGHT AT BEDTIME AS NEEDED FOR ITCH    Itchy skin       ibuprofen 200 MG capsule      Take 200 mg by mouth every 4 hours as needed.        levalbuterol 45 MCG/ACT Inhaler    XOPENEX HFA    1 Inhaler    Inhale 2 puffs into the lungs every 4 hours as needed    Mild persistent asthma, uncomplicated       losartan 100 MG tablet    COZAAR    90 tablet    TAKE 1 TABLET (100 MG) BY MOUTH DAILY    Essential hypertension with goal blood pressure less than 140/90       metFORMIN 1000 MG tablet    GLUCOPHAGE    180 tablet    Take 1 tablet (1,000 mg) by mouth 2 times daily (with meals)    Type 2 diabetes mellitus without complication, without long-term current use of insulin (H)       methotrexate sodium 2.5 MG Tabs     96 tablet    Take 20 mg by mouth once a week . Take all 8 tablets on the same day of each week.    Seronegative rheumatoid arthritis (H)       metoprolol succinate 25 MG 24 hr tablet    TOPROL-XL    30 tablet    TAKE 1 TABLET(25 MG) BY MOUTH DAILY    Hypertension goal BP (blood pressure) < 140/90       mometasone 220 MCG/INH Inhaler    ASMANEX 60 METERED DOSES    1 Inhaler    Inhale 1 puff into the lungs 2 times daily    SOB (shortness of breath)       order for DME     1 each    Class I Jobst compression stockings    Edema of both ankles       simvastatin 10 MG tablet    ZOCOR    90 tablet    Take 1  tablet (10 mg) by mouth At Bedtime    Hyperlipidemia LDL goal <100       tacrolimus 0.1 % ointment    PROTOPIC    60 g    Apply to the face daily, already failed desonide    AD (atopic dermatitis)       triamcinolone 55 MCG/ACT Inhaler    NASACORT     Spray 2 sprays into both nostrils daily    Seasonal allergic rhinitis       VITAMIN C PO      Take  by mouth.        VITAMIN D PO      Take  by mouth.

## 2018-04-19 NOTE — PROGRESS NOTES
"SUBJECTIVE:  Nancy Abarca is a 57 year old morbidly obese female diabetic with RA and normocytic anemia who presents with hypertension. She had allergic symptoms with high dose hydralazine which have resolved with discontinuation of this medication. Symptom onset has been improving for a time period of days. Severity is described as none. Course of her symptoms over time is improving. She has headache and malaise.     OBJECTIVE:  EXAM:  /90  Pulse 84  Temp 97.7  F (36.5  C) (Oral)  Resp 16  Ht 5' 6\" (1.676 m)  Wt 232 lb (105.2 kg)  LMP 03/04/2011  SpO2 100%  Breastfeeding? No  BMI 37.45 kg/m2   Constitutional: alert, no distress and cooperative   Psychiatric: mentation appears normal and affect normal/bright  JOINT/EXTREMITIES: extremities normal- no gross deformities noted and gait normal    Results for orders placed or performed in visit on 10/26/17   Pap imaged thin layer screen with HPV - recommended age 30 - 65 years (select HPV order below)   Result Value Ref Range    PAP NIL     Copath Report         Patient Name: NANCY ABARCA  MR#: [id number]  Specimen #: [id number]  Collected: 10/26/2017  Received: 10/27/2017  Reported: 10/31/2017 08:41  Ordering Phy(s): MIKE CARSON    For improved result formatting, select 'View Enhanced Report Format'  under Linked Documents section.    SPECIMEN/STAIN PROCESS:  Pap imaged thin layer prep screening (Surepath, FocalPoint with guided  screening)       Pap-Cyto x 1, HPV ordered x 1    SOURCE: Cervical, endocervical  ----------------------------------------------------------------   Pap imaged thin layer prep screening (Surepath, FocalPoint with guided  screening)  SPECIMEN ADEQUACY:  Satisfactory for evaluation.  -Transformation zone component present.    CYTOLOGIC INTERPRETATION:    Negative for intraepithelial lesion or malignancy         Other Non-Neoplastic Findings:  -Atrophy.  -Inflammation present.    Electronically signed out by:  Mervin" BRYCE Bird (ASCP)    Processed and screened at [facility]    CLINICAL HISTORY:  LMP: 3/4/11  Previous normal pap  Date of Last Pap: 5/24/13,    Papanicolaou Test Limitations:  Cervical cytology is a screening test  with limited sensitivity; regular screening is critical for cancer  prevention; Pap tests are primarily effective for the  diagnosis/prevention of squamous cell carcinoma, not adenocarcinomas or  other cancers.    TESTING LAB LOCATION:  [address]  [phone]    COLLECTION SITE:  Client:  [facility]  Location: [facility] (B)     HPV High Risk Types DNA Cervical   Result Value Ref Range    HPV 16 DNA Negative NEG^Negative    HPV 18 DNA Negative NEG^Negative    Other HR HPV Negative NEG^Negative    Final Diagnosis This patient's sample is negative for HPV DNA.     Specimen Description Cervical Cells          ASSESSMENT/PLAN:  (I10) Hypertension goal BP (blood pressure) < 140/90  (primary encounter diagnosis)  Plan: Albumin Random Urine Quantitative with Creat         Ratio, TSH WITH FREE T4 REFLEX, metoprolol         (TOPROL-XL) 25 MG 24 hr tablet        Discussed risks and benefits of this medication. Follow up ancillary blood pressure recheck in one month or sooner for worsening of symptoms or side effects.      (E66.01) Morbid obesity due to excess calories (H)  (E11.9) Type 2 diabetes mellitus without complication, without long-term current use of insulin (H)  Plan: Lipid panel reflex to direct LDL Fasting,         Albumin Random Urine Quantitative with Creat         Ratio, TSH WITH FREE T4 REFLEX, Hemoglobin A1c          (E78.5) Hyperlipidemia LDL goal <100  Plan: Lipid panel reflex to direct LDL Fasting, TSH         WITH FREE T4 REFLEX          (D64.9) Normocytic anemia  Plan: Vitamin B12, Iron and iron binding capacity,         Ferritin,  Folate            Paula Alarcon MD    NICHELLE Score (Last Two) 7/26/2011 10/26/2017   NICHELLE Raw Score 38 27   Activation Score 52.9 47.4   NICHELLE Level 2 2          5

## 2018-05-08 DIAGNOSIS — R06.02 SOB (SHORTNESS OF BREATH): ICD-10-CM

## 2018-05-08 NOTE — TELEPHONE ENCOUNTER
Pending Prescriptions:                       Disp   Refills    mometasone (ASMANEX 60 METERED DOSES) 220*1 Inha*1            Sig: Inhale 1 puff into the lungs 2 times daily    Barbara Salgado CMA on 5/8/2018 at 2:19 PM

## 2018-05-08 NOTE — TELEPHONE ENCOUNTER
Pending Prescriptions:                       Disp   Refills    mometasone (ASMANEX 60 METERED DOSES) 220*1 Inha*1            Sig: Inhale 1 puff into the lungs 2 times daily    Routing refill request to provider for review/approval because:  Last office visit and ACT >6 months ago.  Attempted to contact patient regarding scheduling an appointment, but wasn't able to reach her.    Anastasiia Vu RN

## 2018-06-13 ENCOUNTER — RADIANT APPOINTMENT (OUTPATIENT)
Dept: GENERAL RADIOLOGY | Facility: CLINIC | Age: 58
End: 2018-06-13
Attending: NURSE PRACTITIONER
Payer: COMMERCIAL

## 2018-06-13 ENCOUNTER — OFFICE VISIT (OUTPATIENT)
Dept: FAMILY MEDICINE | Facility: CLINIC | Age: 58
End: 2018-06-13
Payer: COMMERCIAL

## 2018-06-13 VITALS
WEIGHT: 238 LBS | SYSTOLIC BLOOD PRESSURE: 134 MMHG | DIASTOLIC BLOOD PRESSURE: 84 MMHG | HEART RATE: 88 BPM | HEIGHT: 66 IN | TEMPERATURE: 98.4 F | RESPIRATION RATE: 20 BRPM | BODY MASS INDEX: 38.25 KG/M2 | OXYGEN SATURATION: 99 %

## 2018-06-13 DIAGNOSIS — M06.00 SERONEGATIVE RHEUMATOID ARTHRITIS (H): ICD-10-CM
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
## 2018-06-13 DIAGNOSIS — M54.50 ACUTE RIGHT-SIDED LOW BACK PAIN WITHOUT SCIATICA: ICD-10-CM

## 2018-06-13 DIAGNOSIS — M54.50 ACUTE RIGHT-SIDED LOW BACK PAIN WITHOUT SCIATICA: Primary | ICD-10-CM

## 2018-06-13 DIAGNOSIS — S32.010A CLOSED COMPRESSION FRACTURE OF FIRST LUMBAR VERTEBRA, INITIAL ENCOUNTER: Primary | ICD-10-CM

## 2018-06-13 PROCEDURE — 72100 X-RAY EXAM L-S SPINE 2/3 VWS: CPT

## 2018-06-13 PROCEDURE — 99213 OFFICE O/P EST LOW 20 MIN: CPT | Performed by: NURSE PRACTITIONER

## 2018-06-13 ASSESSMENT — PAIN SCALES - GENERAL: PAINLEVEL: SEVERE PAIN (6)

## 2018-06-13 NOTE — MR AVS SNAPSHOT
After Visit Summary   6/13/2018    Jorge Abarca    MRN: [id number]           Patient Information     Date Of Birth          1960        Visit Information        Provider Department      6/13/2018 1:40 PM Cheryl Gautam APRN [facility]        Today's Diagnoses     Acute right-sided low back pain without sciatica    -  1    Fall, initial encounter          Care Instructions    When You Have Low Back Pain    Caring for Your Back  You are not alone.    Low back pain is very common. Nearly half of all adults have low back pain in any given year. The good news is that back pain is rarely a danger to your health. Most people can manage their back pain on their own and about half of them start feeling better within 2 weeks. In 9 out of 10 cases, low back pain goes away or no longer limits daily activity within 6 weeks.     Your outlook is good!    Your symptoms tell us that your low back pain is most likely not a danger to you. Most of the time we do not know the exact cause of low back pain, even if you see a doctor or have an MRI. However, treatment can still work without knowing the cause of the pain. Less than 1 in 100 people need surgery for their back pain.     What can I do about my low back pain?     There are three things you can do to ease low back pain and help it go away.    Use heat or cold packs.    Take medicine as directed.    Use positions, movements and exercises.     Using heat or cold packs    Try cold packs or gentle heat to ease your pain. Use whichever gives you the most relief. Apply the cold pack or heat for 15 minutes at a time, as often as needed.    Taking medicine      If your doctor has prescribed medicine, be sure to follow the directions.    If you take over-the-counter medicine, read and follow the directions.    Talk to your doctor if you have any questions.     Using positions, movements and exercises    Research tells us that moving your  joints and muscles can help you recover from back pain. Such activity should be simple and gentle. Use the positions in the photos as well as walking to help relieve your pain. Try taking a short walk every 3 to 4 hours during the day. Walk for a few minutes inside your home or take longer walks outside, on a treadmill or at a mall. Slowly increase the amount of time you walk. Expect discomfort when you begin, but it should lessen as your back starts to heal. When your back feels better, walk daily to keep your back and body healthy.    Finding a comfortable position    When your back pain is new, certain positions will ease your pain. Gently try each of the positions below until you find one that is helpful. Once you find a position of comfort, use it as often as you like when you are resting. You will recover faster if you combine rest with activity.         Lie on your back with your legs bent. You can do this by placing a pillow under your knees. Or you may lie on the floor and rest your lower legs on the seat of a chair.       Lie on your side with your knees bent, and place a pillow between your knees.       Lie on your stomach over pillows.      When should I call my doctor?    Your back pain should improve over the first couple of weeks. As it improves, you should be able to return to your normal activities. But call your doctor if:    You have a sudden change in your ability to control your bladder or bowels.    You feel tingling in your groin or legs.    The pain spreads down your leg and into your foot.    Your toes, feet or leg muscles feel weak.    You feel generally unwell or sick.    Your pain does not get better or gets worse.      If you are deaf or hard of hearing, please let us know. We provide many free services including sign language interpreters,oral interpreters, TTYs, telephone amplifiers, note takers and written materials.    For informational purposes only. Not to replace the advice of  your health care provider. Copyright   2013 [facility]. All rights reserved. [employer] 757204 - Rev 06/14.    [facility] (formerly [facility])[3]    If you have any questions regarding to your visit please contact your care team:       Team Red:   Clinic Hours Telephone Number   Dr. Paula Gautam, NP   7am-7pm  Monday - Thursday   7am-5pm  Fridays  [phone]  (Appointment scheduling available 24/7)    Questions about your recent visit?   Team Line  [phone]   Urgent Care - [place] and [facility] - 11am-9pm Monday-Friday Saturday-Sunday- 9am-5pm   [place] - 5pm-9pm Monday-Friday Saturday-Sunday- 9am-5pm  [phone] - [place]  [phone] - [place]       What options do I have for a visit other than an office visit? We offer electronic visits (e-visits) and telephone visits, when medically appropriate.  Please check with your medical insurance to see if these types of visits are covered, as you will be responsible for any charges that are not paid by your insurance.      You can use Endgame (secure electronic communication) to access to your chart, send your primary care provider a message, or make an appointment. Ask a team member how to get started.     For a price quote for your services, please call our Consumer Price Line at [phone] or our Imaging Cost estimation line at [phone] (for imaging tests).    Discharged by Nikkie Quiñonez MA.            Follow-ups after your visit        Your next 10 appointments already scheduled     Jul 23, 2018  3:00 PM CDT   LAB with [facility]   [facility]    [address] [id number] [phone]           Please do not eat 10-12 hours before your appointment if you are coming in fasting for labs on lipids, cholesterol, or glucose (sugar). This does not apply to pregnant women. Water, hot tea and black coffee (with nothing  "added) are okay. Do not drink other fluids, diet soda or chew gum.            Jul 26, 2018  3:20 PM CDT   Return Visit with Chris Capellan MD   [facility] [place] ([facility])    [address]  [place] [address]   [phone]              Future tests that were ordered for you today     Open Future Orders        Priority Expected Expires Ordered    XR Lumbar Spine 2/3 Views Routine 6/13/2018 6/13/2019 6/13/2018            Who to contact     If you have questions or need follow up information about today's clinic visit or your schedule please contact [facility] directly at [phone].  Normal or non-critical lab and imaging results will be communicated to you by [employer] Software[employer], letter or phone within 4 business days after the clinic has received the results. If you do not hear from us within 7 days, please contact the clinic through HItviewst or phone. If you have a critical or abnormal lab result, we will notify you by phone as soon as possible.  Submit refill requests through "[employer] or call your pharmacy and they will forward the refill request to us. Please allow 3 business days for your refill to be completed.          Additional Information About Your Visit        "[employer] Information     "[employer] gives you secure access to your electronic health record. If you see a primary care provider, you can also send messages to your care team and make appointments. If you have questions, please call your primary care clinic.  If you do not have a primary care provider, please call [phone] and they will assist you.        Care EveryWhere ID     This is your Care EveryWhere ID. This could be used by other organizations to access your [facility] medical records  [id number]        Your Vitals Were     Pulse Temperature Respirations Height Last Period Pulse Oximetry    88 98.4  F (36.9  C) (Oral) 20 5' 6\" (1.676 m) 03/04/2011 99%    BMI (Body Mass Index)                   38.41 " kg/m2            Blood Pressure from Last 3 Encounters:   06/13/18 134/84   04/04/18 132/82   01/03/18 144/82    Weight from Last 3 Encounters:   06/13/18 238 lb (108 kg)   04/04/18 237 lb 3.2 oz (107.6 kg)   01/03/18 233 lb 3.2 oz (105.8 kg)               Primary Care Provider Office Phone # Fax #    Paula Alarcon [phone]       [address] [address]        Equal Access to Services     [facility]: Hadii aad ku hadasho Soomaali, waaxda luqadaha, qaybta kaalmada adeegyada, kodi granados . So [facility] [phone].    ATENCIÓN: Si habla español, tiene a murphy disposición servicios gratuitos de asistencia lingüística. Ll[facility] [phone].    We comply with applicable federal civil rights laws and [place] laws. We do not discriminate on the basis of race, color, national origin, age, disability, sex, sexual orientation, or gender identity.            Thank you!     Thank you for choosing [facility]  for your care. Our goal is always to provide you with excellent care. Hearing back from our patients is one way we can continue to improve our services. Please take a few minutes to complete the written survey that you may receive in the mail after your visit with us. Thank you!             Your Updated Medication List - Protect others around you: Learn how to safely use, store and throw away your medicines at www.disposemymeds.org.          This list is accurate as of 6/13/18  2:07 PM.  Always use your most recent med list.                   Brand Name Dispense Instructions for use Diagnosis    aspirin 81 MG tablet     30 tablet    Take 1 tablet (81 mg) by mouth daily    Type 2 diabetes mellitus without complication, without long-term current use of insulin (H)       blood glucose monitoring test strip    no brand specified    3 Month    Use to test blood sugar 1 times daily or as directed. One touch ultra test strips    DM type 2, goal A1c  below 7       CALCIUM 1200 PO      Take  by mouth.        cetirizine 10 MG tablet    ZYRTEC ALLERGY    60 tablet    Take 1-2 tablets (10-20 mg) by mouth daily as needed for allergies    Itchy skin       CRANBERRY           DAILY MULTIVITAMIN PO      Take  by mouth.        FISH OIL           folic acid 1 MG tablet    FOLVITE    100 tablet    Take 1 tablet (1 mg) by mouth daily    Seronegative rheumatoid arthritis (H)       hydroxychloroquine 200 MG tablet    PLAQUENIL    180 tablet    Take 1 tablet (200 mg) by mouth 2 times daily    Seronegative rheumatoid arthritis (H)       hydrOXYzine 25 MG tablet    ATARAX    60 tablet    TAKE 1 TO 2 TABLETS BY MOUTH EVERY NIGHT AT BEDTIME AS NEEDED FOR ITCH    Itchy skin       ibuprofen 200 MG capsule      Take 200 mg by mouth every 4 hours as needed.        levalbuterol 45 MCG/ACT Inhaler    XOPENEX HFA    1 Inhaler    Inhale 2 puffs into the lungs every 4 hours as needed    Mild persistent asthma, uncomplicated       losartan 100 MG tablet    COZAAR    90 tablet    TAKE 1 TABLET (100 MG) BY MOUTH DAILY    Essential hypertension with goal blood pressure less than 140/90       metFORMIN 1000 MG tablet    GLUCOPHAGE    180 tablet    Take 1 tablet (1,000 mg) by mouth 2 times daily (with meals)    Type 2 diabetes mellitus without complication, without long-term current use of insulin (H)       methotrexate sodium 2.5 MG Tabs     96 tablet    Take 20 mg by mouth once a week . Take all 8 tablets on the same day of each week.    Seronegative rheumatoid arthritis (H)       metoprolol succinate 25 MG 24 hr tablet    TOPROL-XL    30 tablet    TAKE 1 TABLET(25 MG) BY MOUTH DAILY    Hypertension goal BP (blood pressure) < 140/90       mometasone 220 MCG/INH Inhaler    ASMANEX 60 METERED DOSES    1 Inhaler    Inhale 1 puff into the lungs 2 times daily    SOB (shortness of breath)       order for DME     1 each    Class I Jobst compression stockings    Edema of both ankles       simvastatin  10 MG tablet    ZOCOR    90 tablet    Take 1 tablet (10 mg) by mouth At Bedtime    Hyperlipidemia LDL goal <100       tacrolimus 0.1 % ointment    PROTOPIC    60 g    Apply to the face daily, already failed desonide    AD (atopic dermatitis)       triamcinolone 55 MCG/ACT Inhaler    NASACORT     Spray 2 sprays into both nostrils daily    Seasonal allergic rhinitis       VITAMIN C PO      Take  by mouth.        VITAMIN D PO      Take  by mouth.

## 2018-06-13 NOTE — PATIENT INSTRUCTIONS
When You Have Low Back Pain    Caring for Your Back  You are not alone.    Low back pain is very common. Nearly half of all adults have low back pain in any given year. The good news is that back pain is rarely a danger to your health. Most people can manage their back pain on their own and about half of them start feeling better within 2 weeks. In 9 out of 10 cases, low back pain goes away or no longer limits daily activity within 6 weeks.     Your outlook is good!    Your symptoms tell us that your low back pain is most likely not a danger to you. Most of the time we do not know the exact cause of low back pain, even if you see a doctor or have an MRI. However, treatment can still work without knowing the cause of the pain. Less than 1 in 100 people need surgery for their back pain.     What can I do about my low back pain?     There are three things you can do to ease low back pain and help it go away.    Use heat or cold packs.    Take medicine as directed.    Use positions, movements and exercises.     Using heat or cold packs    Try cold packs or gentle heat to ease your pain. Use whichever gives you the most relief. Apply the cold pack or heat for 15 minutes at a time, as often as needed.    Taking medicine      If your doctor has prescribed medicine, be sure to follow the directions.    If you take over-the-counter medicine, read and follow the directions.    Talk to your doctor if you have any questions.     Using positions, movements and exercises    Research tells us that moving your joints and muscles can help you recover from back pain. Such activity should be simple and gentle. Use the positions in the photos as well as walking to help relieve your pain. Try taking a short walk every 3 to 4 hours during the day. Walk for a few minutes inside your home or take longer walks outside, on a treadmill or at a mall. Slowly increase the amount of time you walk. Expect discomfort when you begin, but it should  lessen as your back starts to heal. When your back feels better, walk daily to keep your back and body healthy.    Finding a comfortable position    When your back pain is new, certain positions will ease your pain. Gently try each of the positions below until you find one that is helpful. Once you find a position of comfort, use it as often as you like when you are resting. You will recover faster if you combine rest with activity.         Lie on your back with your legs bent. You can do this by placing a pillow under your knees. Or you may lie on the floor and rest your lower legs on the seat of a chair.       Lie on your side with your knees bent, and place a pillow between your knees.       Lie on your stomach over pillows.      When should I call my doctor?    Your back pain should improve over the first couple of weeks. As it improves, you should be able to return to your normal activities. But call your doctor if:    You have a sudden change in your ability to control your bladder or bowels.    You feel tingling in your groin or legs.    The pain spreads down your leg and into your foot.    Your toes, feet or leg muscles feel weak.    You feel generally unwell or sick.    Your pain does not get better or gets worse.      If you are deaf or hard of hearing, please let us know. We provide many free services including sign language interpreters,oral interpreters, TTYs, telephone amplifiers, note takers and written materials.    For informational purposes only. Not to replace the advice of your health care provider. Copyright   2013 [facility]. All rights reserved. [employer] 957211 - Rev 06/14.    [facility]    If you have any questions regarding to your visit please contact your care team:       Team Red:   Clinic Hours Telephone Number   Dr. Paula Gautam, NP   7am-7pm  Monday - Thursday   7am-5pm  Fridays  [phone]  (Appointment scheduling available 24/7)    Questions about your recent visit?   Team Line  [phone]   Urgent Care - [place] - 11am-9pm Monday-Friday Saturday-Sunday- 9am-5pm   [place] - 5pm-9pm Monday-Friday Saturday-Sunday- 9am-5pm  [phone] - [place]  [phone] - [place]       What options do I have for a visit other than an office visit? We offer electronic visits (e-visits) and telephone visits, when medically appropriate.  Please check with your medical insurance to see if these types of visits are covered, as you will be responsible for any charges that are not paid by your insurance.      You can use Stylefinch (secure electronic communication) to access to your chart, send your primary care provider a message, or make an appointment. Ask a team member how to get started.     For a price quote for your services, please call our Consumer Price Line at [phone] or our Imaging Cost estimation line at [phone] (for imaging tests).    Discharged by Nikkie Quiñonez MA.

## 2018-06-13 NOTE — PROGRESS NOTES
I called patient and discussed her results.  Will do DEXA- has had 2 fractures in the past year.  Cheryl Gautam, CNP

## 2018-06-13 NOTE — PROGRESS NOTES
SUBJECTIVE:   Jorge Abarca is a 57 year old female who presents to clinic today for the following health issues:      Fall      Duration: Saturday, 06/09/2018    Description (location/character/radiation): states she fell off of step stool landing on bottom, now is having pain in lower back    Intensity:  moderate    Accompanying signs and symptoms: none    History (similar episodes/previous evaluation): History of 2 back surgeries    Precipitating or alleviating factors: None    Therapies tried and outcome: Ibuprofen which helps     Constant, moderate back pain since the fall. Pain worsens with twisting/rolling movements in bed. No radicular/sciatica type pain. Denies saddle anesthesia, fever.       Problem list and histories reviewed & adjusted, as indicated.  Additional history: as documented    Patient Active Problem List   Diagnosis     Hypertension goal BP (blood pressure) < 140/90     Allergy to mold spores     House dust mite allergy     Allergic rhinitis due to animal dander     Need for desensitization to allergens     Diagnostic skin and sensitization tests     Hyperlipidemia LDL goal <100     Degenerative disc disease, lumbar     Lumbar disc herniation     History of DVT (deep vein thrombosis)     Mild persistent asthma without complication     Seronegative rheumatoid arthritis (H)     High risk medications (not anticoagulants) long-term use     Trigger finger, left middle finger     Allergic rhinitis due to pollen     Type 2 diabetes mellitus without complication, without long-term current use of insulin (H)     Seasonal allergic rhinitis, unspecified allergic rhinitis trigger     Morbid obesity due to excess calories (H)     Grief     Advanced directives, counseling/discussion     Past Surgical History:   Procedure Laterality Date     BACK SURGERY  2009     CL AFF SURGICAL PATHOLOGY  1988    left foot     DISCECTOMY LUMBAR POSTERIOR MICROSCOPIC ONE LEVEL  3/18/2013    Procedure: DISCECTOMY LUMBAR  "POSTERIOR MICROSCOPIC ONE LEVEL;  Left Lumbar 4-5 Micro Discectomy;  Surgeon: Dallas Jensen MD;  Location: [facility]     TONSILLECTOMY & ADENOIDECTOMY  age 3       Social History   Substance Use Topics     Smoking status: Never Smoker     Smokeless tobacco: Never Used     Alcohol use No     Family History   Problem Relation Age of Onset     Cardiovascular Sister      atrial fibrillation     CEREBROVASCULAR DISEASE Brother      carotid stenosis     CANCER Father      lung     DIABETES Mother      Breast Cancer Maternal Grandmother      also cousin on this side     Breast Cancer Paternal Aunt      C.A.D. No family hx of      Autoimmune Disease No family hx of            Reviewed and updated as needed this visit by clinical staff       Reviewed and updated as needed this visit by Provider         ROS:  Constitutional, MS, neuro, skin systems are negative, except as otherwise noted.    OBJECTIVE:     /84 (BP Location: Left arm, Patient Position: Chair, Cuff Size: Adult Large)  Pulse 88  Temp 98.4  F (36.9  C) (Oral)  Resp 20  Ht 5' 6\" (1.676 m)  Wt 238 lb (108 kg)  LMP 03/04/2011  SpO2 99%  BMI 38.41 kg/m2  Body mass index is 38.41 kg/(m^2).  GENERAL: healthy, alert and no distress  Comprehensive back pain exam:  No tenderness, Left lateral bending, Lower extremity strength functional and equal on both sides, Lower extremity reflexes within normal limits bilaterally, Lower extremity sensation normal and equal on both sides and Straight leg raise negative bilaterally    Diagnostic Test Results:  X-ray of Lumbar spine: Pending    ASSESSMENT/PLAN:       1. Acute right-sided low back pain without sciatica  Use ice and/or heat to the back three times per day for 15-20 minutes.  Start some gentle stretches/ROM for the back.  May use over the counter Tylenol up to 1,000 mg three times per day and/or Ibuprofen 600 mg every 6 hours for the pain.  Patient declines muscle relaxer.  Consider Physical " Therapy if symptoms persist.    - XR Lumbar Spine 2/3 Views; Future    2. Fall, initial encounter    - XR Lumbar Spine 2/3 Views; Future    Follow up as needed    LUKE Garrido CNP  [facility]

## 2018-06-14 ASSESSMENT — ASTHMA QUESTIONNAIRES: ACT_TOTALSCORE: 25

## 2018-06-17 ENCOUNTER — MYC MEDICAL ADVICE (OUTPATIENT)
Dept: FAMILY MEDICINE | Facility: CLINIC | Age: 58
End: 2018-06-17

## 2018-06-18 ENCOUNTER — MYC MEDICAL ADVICE (OUTPATIENT)
Dept: FAMILY MEDICINE | Facility: CLINIC | Age: 58
End: 2018-06-18

## 2018-06-18 NOTE — TELEPHONE ENCOUNTER
Please advise on Xray results, resulted on 6/13.  Result note states provider called patient to discuss.    Please see Si TV message and advise.     Claudia Chua RN, BSN, PHN

## 2018-07-12 ENCOUNTER — MYC MEDICAL ADVICE (OUTPATIENT)
Dept: ALLERGY | Facility: OTHER | Age: 58
End: 2018-07-12

## 2018-07-12 DIAGNOSIS — R06.02 SOB (SHORTNESS OF BREATH): ICD-10-CM

## 2018-07-12 NOTE — TELEPHONE ENCOUNTER
Patient requesting Asmanex Inhaler to be sent to [facility] with 90 day supply.    Routing refill request to provider for review/approval because:  Last office visit: 09/01/17  Last ACT: 20 (6/13/2018)    Tamie Covington RN

## 2018-07-13 NOTE — TELEPHONE ENCOUNTER
Patient requesting Asmanex Inhaler to be sent to [facility] with 90 day supply.     Routing refill request to provider for review/approval because:  Last office visit: 09/01/17  Last ACT: 20 (6/13/2018)    Iram Malone RN

## 2018-07-16 DIAGNOSIS — E78.5 HYPERLIPIDEMIA LDL GOAL <100: ICD-10-CM

## 2018-07-16 RX ORDER — SIMVASTATIN 10 MG
TABLET ORAL
Qty: 90 TABLET | Refills: 3 | Status: SHIPPED | OUTPATIENT
Start: 2018-07-16 | End: 2018-07-17

## 2018-07-16 NOTE — TELEPHONE ENCOUNTER
Signed Prescriptions:                        Disp   Refills    simvastatin (ZOCOR) 10 MG tablet           90 tab*3        Sig: TAKE 1 TABLET(10 MG) BY MOUTH AT BEDTIME  Authorizing Provider: MIKE CARSON  Ordering User: IRAM GARCIA RN refilled medication per [facility] Refill Protocol.     Iram Garcia RN

## 2018-07-17 ENCOUNTER — MYC MEDICAL ADVICE (OUTPATIENT)
Dept: FAMILY MEDICINE | Facility: CLINIC | Age: 58
End: 2018-07-17

## 2018-07-17 DIAGNOSIS — L29.9 ITCHY SKIN: ICD-10-CM

## 2018-07-17 DIAGNOSIS — E78.5 HYPERLIPIDEMIA LDL GOAL <100: ICD-10-CM

## 2018-07-17 DIAGNOSIS — M06.00 SERONEGATIVE RHEUMATOID ARTHRITIS (H): ICD-10-CM

## 2018-07-17 DIAGNOSIS — I10 ESSENTIAL HYPERTENSION WITH GOAL BLOOD PRESSURE LESS THAN 140/90: ICD-10-CM

## 2018-07-17 DIAGNOSIS — J45.30 MILD PERSISTENT ASTHMA, UNCOMPLICATED: ICD-10-CM

## 2018-07-17 DIAGNOSIS — I10 HYPERTENSION GOAL BP (BLOOD PRESSURE) < 140/90: ICD-10-CM

## 2018-07-17 DIAGNOSIS — E11.9 TYPE 2 DIABETES MELLITUS WITHOUT COMPLICATION, WITHOUT LONG-TERM CURRENT USE OF INSULIN (H): ICD-10-CM

## 2018-07-17 NOTE — TELEPHONE ENCOUNTER
Jorge Alarcon MD 1 hour ago (6:39 AM)                        Due to a change in my insurance I would like to start getting my prescriptions via mail order.   Could I please have prescriptions for a 90 day supply of my medications sent to [facility] [place] [phone]?   Metoprolol ER Succinate 25mg   Metformin 1000mg   Losartan 100mg   Simvastatin 10mg   Hydroxyzine Hcl 25mg     Thank You

## 2018-07-18 RX ORDER — METOPROLOL SUCCINATE 25 MG/1
TABLET, EXTENDED RELEASE ORAL
Qty: 30 TABLET | Refills: 8 | Status: SHIPPED | OUTPATIENT
Start: 2018-07-18 | End: 2018-08-07 | Stop reason: DRUGHIGH

## 2018-07-18 RX ORDER — HYDROXYZINE HYDROCHLORIDE 25 MG/1
TABLET, FILM COATED ORAL
Qty: 60 TABLET | Refills: 8 | Status: SHIPPED | OUTPATIENT
Start: 2018-07-18 | End: 2019-05-02

## 2018-07-18 RX ORDER — SIMVASTATIN 10 MG
TABLET ORAL
Qty: 90 TABLET | Refills: 1 | Status: SHIPPED | OUTPATIENT
Start: 2018-07-18 | End: 2019-01-07

## 2018-07-18 NOTE — TELEPHONE ENCOUNTER
Patient due for DM check with Primary Care Provider. Please help her schedule. Ok to give 30 day supply once scheduled.  Cheryl Gautam, CNP

## 2018-07-18 NOTE — TELEPHONE ENCOUNTER
MA - call patient and schedule a diabetes follow up with provider.    Then reroute to RN refill pool.    Kimber Camacho RN - BC

## 2018-07-18 NOTE — TELEPHONE ENCOUNTER
Routing refill request to provider for review/approval because:  Labs not current:        Requested Prescriptions   Pending Prescriptions Disp Refills     metFORMIN (GLUCOPHAGE) 1000 MG tablet  Last Written Prescription Date:  10/26/17  Last Fill Quantity: 180,  # refills: 3   Last office visit: 6/13/2018 with prescribing provider:     Future Office Visit:   Next 5 appointments (look out 90 days)     Jul 26, 2018  3:20 PM CDT   Return Visit with Chris Capellan MD   [facility] ([facility]    [address]  [place] [address]   [phone]                  180 tablet 3     Sig: Take 1 tablet (1,000 mg) by mouth 2 times daily (with meals)    Biguanide Agents Failed    7/17/2018 11:05 AM       Failed - Patient has documented A1c within the specified period of time.    If HgbA1C is 8 or greater, it needs to be on file within the past 3 months.  If less than 8, must be on file within the past 6 months.     Recent Labs   Lab Test  12/19/17   0916   A1C  6.4*            Passed - Blood pressure less than 140/90 in past 6 months    BP Readings from Last 3 Encounters:   06/13/18 134/84   04/04/18 132/82   01/03/18 144/82                Passed - Patient has documented LDL within the past 12 mos.    Recent Labs   Lab Test  12/19/17   0916   LDL  39            Passed - Patient has had a Microalbumin in the past 12 mos.    Recent Labs   Lab Test  12/19/17   0925   MICROL  12   UMALCR  7.14            Passed - Patient is age 10 or older       Passed - Patient's CR is NOT>1.4 OR Patient's EGFR is NOT<45 within past 12 mos.    Recent Labs   Lab Test  04/02/18   1333   GFRESTIMATED  59*   GFRESTBLACK  72       Recent Labs   Lab Test  04/02/18   1333   CR  0.96            Passed - Patient does NOT have a diagnosis of CHF.       Passed - Patient is not pregnant       Passed - Patient has not had a positive pregnancy test within the past 12 mos.        Passed - Recent (6 mo) or future (30 days)  "visit within the authorizing provider's specialty    Patient had office visit in the last 6 months or has a visit in the next 30 days with authorizing provider or within the authorizing provider's specialty.  See \"Patient Info\" tab in inbasket, or \"Choose Columns\" in Meds & Orders section of the refill encounter.            losartan (COZAAR) 100 MG tablet  Last Written Prescription Date:  12/26/17  Last Fill Quantity: 90,  # refills: 3   Last office visit: 6/13/2018 with prescribing provider:     Future Office Visit:   Next 5 appointments (look out 90 days)     Jul 26, 2018  3:20 PM CDT   Return Visit with Chris Capellan MD   [facility] ([place] [id number] [facility] [address] [phone]                  90 tablet 3     Sig: TAKE 1 TABLET (100 MG) BY MOUTH DAILY    Angiotensin-II Receptors Failed    7/17/2018 11:05 AM       Failed - Normal serum potassium on file in past 12 months    Recent Labs   Lab Test  06/28/17   1221   POTASSIUM  4.3                   Passed - Blood pressure under 140/90 in past 12 months    BP Readings from Last 3 Encounters:   06/13/18 134/84   04/04/18 132/82   01/03/18 144/82                Passed - Recent (12 mo) or future (30 days) visit within the authorizing provider's specialty    Patient had office visit in the last 12 months or has a visit in the next 30 days with authorizing provider or within the authorizing provider's specialty.  See \"Patient Info\" tab in inbasket, or \"Choose Columns\" in Meds & Orders section of the refill encounter.           Passed - Patient is age 18 or older       Passed - No active pregnancy on record       Passed - Normal serum creatinine on file in past 12 months    Recent Labs   Lab Test  04/02/18   1333   CR  0.96            Passed - No positive pregnancy test in past 12 months     Kimber Camacho RN - BC      "

## 2018-07-19 RX ORDER — LOSARTAN POTASSIUM 100 MG/1
TABLET ORAL
Qty: 30 TABLET | Refills: 0 | Status: SHIPPED | OUTPATIENT
Start: 2018-07-19 | End: 2018-08-07

## 2018-07-19 NOTE — TELEPHONE ENCOUNTER
Spoke to patient and informed her of below message. Patient has appointment 8/7/2018 with feliberto GARCIA CMA ([facility])

## 2018-07-23 DIAGNOSIS — M06.00 SERONEGATIVE RHEUMATOID ARTHRITIS (H): ICD-10-CM

## 2018-07-23 LAB
ALBUMIN SERPL-MCNC: 4 G/DL (ref 3.4–5)
ALP SERPL-CCNC: 86 U/L (ref 40–150)
ALT SERPL W P-5'-P-CCNC: 36 U/L (ref 0–50)
AST SERPL W P-5'-P-CCNC: 33 U/L (ref 0–45)
BASOPHILS # BLD AUTO: 0 10E9/L (ref 0–0.2)
BASOPHILS NFR BLD AUTO: 0.3 %
BILIRUB DIRECT SERPL-MCNC: <0.1 MG/DL (ref 0–0.2)
BILIRUB SERPL-MCNC: 0.3 MG/DL (ref 0.2–1.3)
CREAT SERPL-MCNC: 0.91 MG/DL (ref 0.52–1.04)
CRP SERPL-MCNC: 3.6 MG/L (ref 0–8)
DIFFERENTIAL METHOD BLD: ABNORMAL
EOSINOPHIL # BLD AUTO: 0.5 10E9/L (ref 0–0.7)
EOSINOPHIL NFR BLD AUTO: 7.9 %
ERYTHROCYTE [DISTWIDTH] IN BLOOD BY AUTOMATED COUNT: 16.6 % (ref 10–15)
ERYTHROCYTE [SEDIMENTATION RATE] IN BLOOD BY WESTERGREN METHOD: 16 MM/H (ref 0–30)
GFR SERPL CREATININE-BSD FRML MDRD: 63 ML/MIN/1.7M2
HCT VFR BLD AUTO: 35 % (ref 35–47)
HGB BLD-MCNC: 11.3 G/DL (ref 11.7–15.7)
LYMPHOCYTES # BLD AUTO: 1.4 10E9/L (ref 0.8–5.3)
LYMPHOCYTES NFR BLD AUTO: 22.9 %
MCH RBC QN AUTO: 29.5 PG (ref 26.5–33)
MCHC RBC AUTO-ENTMCNC: 32.3 G/DL (ref 31.5–36.5)
MCV RBC AUTO: 91 FL (ref 78–100)
MONOCYTES # BLD AUTO: 0.5 10E9/L (ref 0–1.3)
MONOCYTES NFR BLD AUTO: 8.4 %
NEUTROPHILS # BLD AUTO: 3.8 10E9/L (ref 1.6–8.3)
NEUTROPHILS NFR BLD AUTO: 60.5 %
PLATELET # BLD AUTO: 290 10E9/L (ref 150–450)
PROT SERPL-MCNC: 7.5 G/DL (ref 6.8–8.8)
RBC # BLD AUTO: 3.83 10E12/L (ref 3.8–5.2)
WBC # BLD AUTO: 6.3 10E9/L (ref 4–11)

## 2018-07-23 PROCEDURE — 82565 ASSAY OF CREATININE: CPT | Performed by: INTERNAL MEDICINE

## 2018-07-23 PROCEDURE — 86140 C-REACTIVE PROTEIN: CPT | Performed by: INTERNAL MEDICINE

## 2018-07-23 PROCEDURE — 85025 COMPLETE CBC W/AUTO DIFF WBC: CPT | Performed by: INTERNAL MEDICINE

## 2018-07-23 PROCEDURE — 85652 RBC SED RATE AUTOMATED: CPT | Performed by: INTERNAL MEDICINE

## 2018-07-23 PROCEDURE — 80076 HEPATIC FUNCTION PANEL: CPT | Performed by: INTERNAL MEDICINE

## 2018-07-23 PROCEDURE — 36415 COLL VENOUS BLD VENIPUNCTURE: CPT | Performed by: INTERNAL MEDICINE

## 2018-07-26 ENCOUNTER — OFFICE VISIT (OUTPATIENT)
Dept: RHEUMATOLOGY | Facility: CLINIC | Age: 58
End: 2018-07-26
Payer: COMMERCIAL

## 2018-07-26 VITALS
HEART RATE: 82 BPM | WEIGHT: 239 LBS | SYSTOLIC BLOOD PRESSURE: 138 MMHG | BODY MASS INDEX: 38.41 KG/M2 | HEIGHT: 66 IN | OXYGEN SATURATION: 99 % | DIASTOLIC BLOOD PRESSURE: 92 MMHG | RESPIRATION RATE: 16 BRPM

## 2018-07-26 DIAGNOSIS — Z79.899 HIGH RISK MEDICATIONS (NOT ANTICOAGULANTS) LONG-TERM USE: ICD-10-CM

## 2018-07-26 DIAGNOSIS — M06.00 SERONEGATIVE RHEUMATOID ARTHRITIS (H): Primary | ICD-10-CM

## 2018-07-26 PROCEDURE — 99213 OFFICE O/P EST LOW 20 MIN: CPT | Performed by: INTERNAL MEDICINE

## 2018-07-26 RX ORDER — HYDROXYCHLOROQUINE SULFATE 200 MG/1
200 TABLET, FILM COATED ORAL 2 TIMES DAILY
Qty: 180 TABLET | Refills: 3 | Status: SHIPPED | OUTPATIENT
Start: 2018-07-26 | End: 2019-01-24

## 2018-07-26 RX ORDER — METHOTREXATE 2.5 MG/1
20 TABLET ORAL WEEKLY
Qty: 96 TABLET | Refills: 2 | Status: SHIPPED | OUTPATIENT
Start: 2018-07-26 | End: 2019-01-24

## 2018-07-26 NOTE — MR AVS SNAPSHOT
After Visit Summary   7/26/2018    Jorge Abarca    MRN: [id number]           Patient Information     Date Of Birth          1960        Visit Information        Provider Department      7/26/2018 3:20 PM Chris Capellan MD [facility]        Today's Diagnoses     Seronegative rheumatoid arthritis (H)    -  1      Care Instructions    Rheumatology    Dr. Chris Joseph [facility]   (Monday)  [address]  [place] [address]       [facility]   (Tuesday)  [address] [place] [address]    [facility]   (Wed., Thurs., and Friday)  [address] [place] [address]    Phone number: [phone]  Thank you for choosing [place].  Patricia Rizzo CMA            Follow-ups after your visit        Your next 10 appointments already scheduled     Aug 03, 2018  2:30 PM CDT   DX HIP/PELVIS/SPINE with FKDX1   [facility] ([facility])    [address] [id number] [phone]           Please do not take any of the following 24 hours prior to the day of your exam: vitamins, calcium tablets, antacids.  If possible, please wear clothes without metal (snaps, zippers). A sweatsuit works well.            Aug 07, 2018  3:20 PM CDT   Office Visit with Paula Alarcon MD   [facility] ([facility])    [address] [id number] [phone]           Bring a current list of meds and any records pertaining to this visit. For Physicals, please bring immunization records and any forms needing to be filled out. Please arrive 10 minutes early to complete paperwork.            Oct 26, 2018 11:00 AM CDT   LAB with FZ LAB   [facility] ([facility])    [address] [id number] [phone]           Please do not eat 10-12 hours before your appointment if you are coming in fasting for labs on lipids, cholesterol, or  glucose (sugar). This does not apply to pregnant women. Water, hot tea and black coffee (with nothing added) are okay. Do not drink other fluids, diet soda or chew gum.            Jan 21, 2019 11:00 AM CST   LAB with FZ LAB   [facility] [place] ([facility])    [address] [place] [address]  [place] [address]   [phone]           Please do not eat 10-12 hours before your appointment if you are coming in fasting for labs on lipids, cholesterol, or glucose (sugar). This does not apply to pregnant women. Water, hot tea and black coffee (with nothing added) are okay. Do not drink other fluids, diet soda or chew gum.            Jan 24, 2019 11:00 AM CST   Return Visit with Chris Capellan MD   [facility] [place] ([facility])    [address] [place] [address]  [place] [address]   [phone]              Future tests that were ordered for you today     Open Future Orders        Priority Expected Expires Ordered    CBC with platelets differential Routine 10/20/2018 11/23/2018 7/26/2018    Creatinine Routine 10/20/2018 11/23/2018 7/26/2018    Hepatic panel Routine 10/20/2018 11/23/2018 7/26/2018    Hepatic panel Routine 1/17/2019 2/6/2019 7/26/2018    CRP inflammation Routine 1/17/2019 2/6/2019 7/26/2018    Erythrocyte sedimentation rate auto Routine 1/17/2019 2/6/2019 7/26/2018    Creatinine Routine 1/17/2019 2/6/2019 7/26/2018    CBC with platelets differential Routine 1/17/2019 2/6/2019 7/26/2018            Who to contact     If you have questions or need follow up information about today's clinic visit or your schedule please contact [facility] [place] directly at [phone].  Normal or non-critical lab and imaging results will be communicated to you by MyChart, letter or phone within 4 business days after the clinic has received the results. If you do not hear from us within 7 days, please contact the clinic through MyChart or phone. If you have a critical or abnormal lab  "result, we will notify you by phone as soon as possible.  Submit refill requests through [employer] or call your pharmacy and they will forward the refill request to us. Please allow 3 business days for your refill to be completed.          Additional Information About Your Visit        Access Pharmaceuticalshart Information     [employer] gives you secure access to your electronic health record. If you see a primary care provider, you can also send messages to your care team and make appointments. If you have questions, please call your primary care clinic.  If you do not have a primary care provider, please call [phone] and they will assist you.        Care EveryWhere ID     This is your Care EveryWhere ID. This could be used by other organizations to access your [facility] medical records  [id number]        Your Vitals Were     Pulse Respirations Height Last Period Pulse Oximetry BMI (Body Mass Index)    82 16 1.676 m (5' 6\") 03/04/2011 99% 38.58 kg/m2       Blood Pressure from Last 3 Encounters:   06/13/18 134/84   04/04/18 132/82   01/03/18 144/82    Weight from Last 3 Encounters:   07/26/18 108.4 kg (239 lb)   06/13/18 108 kg (238 lb)   04/04/18 107.6 kg (237 lb 3.2 oz)                 Where to get your medicines      These medications [facility] [place], [place] - [address] [place] [address] [facility] [address]     Phone:  [phone]     hydroxychloroquine 200 MG tablet    methotrexate sodium 2.5 MG Tabs          Primary Care Provider Office Phone # Fax #    Paula Alarcon [phone]       [address] [place] [address]  [place] [address]        Equal Access to Services     KEN AVALOS AH: Jc Steel, waurban ba, qaluis felipeta kaalmada opal, kodi denis. So [facility] [phone].    ATENCIÓN: Si habla español, tiene a murphy disposición servicios gratuitos de asistencia lingüística. Llame al " [phone].    We comply with applicable federal civil rights laws and [place] laws. We do not discriminate on the basis of race, color, national origin, age, disability, sex, sexual orientation, or gender identity.            Thank you!     Thank you for choosing [facility]  for your care. Our goal is always to provide you with excellent care. Hearing back from our patients is one way we can continue to improve our services. Please take a few minutes to complete the written survey that you may receive in the mail after your visit with us. Thank you!             Your Updated Medication List - Protect others around you: Learn how to safely use, store and throw away your medicines at www.disposemymeds.org.          This list is accurate as of 7/26/18  3:44 PM.  Always use your most recent med list.                   Brand Name Dispense Instructions for use Diagnosis    aspirin 81 MG tablet     30 tablet    Take 1 tablet (81 mg) by mouth daily    Type 2 diabetes mellitus without complication, without long-term current use of insulin (H)       blood glucose monitoring test strip    no brand specified    3 Month    Use to test blood sugar 1 times daily or as directed. One touch ultra test strips    DM type 2, goal A1c below 7       CALCIUM 1200 PO      Take  by mouth.        cetirizine 10 MG tablet    ZYRTEC ALLERGY    60 tablet    Take 1-2 tablets (10-20 mg) by mouth daily as needed for allergies    Itchy skin       CRANBERRY           DAILY MULTIVITAMIN PO      Take  by mouth.        FISH OIL           folic acid 1 MG tablet    FOLVITE    100 tablet    Take 1 tablet (1 mg) by mouth daily    Seronegative rheumatoid arthritis (H)       hydroxychloroquine 200 MG tablet    PLAQUENIL    180 tablet    Take 1 tablet (200 mg) by mouth 2 times daily    Seronegative rheumatoid arthritis (H)       hydrOXYzine 25 MG tablet    ATARAX    60 tablet    TAKE 1 TO 2 TABLETS BY MOUTH EVERY NIGHT AT BEDTIME AS NEEDED FOR  ITCH    Itchy skin       ibuprofen 200 MG capsule      Take 200 mg by mouth every 4 hours as needed.        levalbuterol 45 MCG/ACT Inhaler    XOPENEX HFA    1 Inhaler    Inhale 2 puffs into the lungs every 4 hours as needed    Mild persistent asthma, uncomplicated       losartan 100 MG tablet    COZAAR    30 tablet    TAKE 1 TABLET (100 MG) BY MOUTH DAILY    Essential hypertension with goal blood pressure less than 140/90       metFORMIN 1000 MG tablet    GLUCOPHAGE    60 tablet    Take 1 tablet (1,000 mg) by mouth 2 times daily (with meals)    Type 2 diabetes mellitus without complication, without long-term current use of insulin (H)       methotrexate sodium 2.5 MG Tabs     96 tablet    Take 20 mg by mouth once a week . Take all 8 tablets on the same day of each week.    Seronegative rheumatoid arthritis (H)       metoprolol succinate 25 MG 24 hr tablet    TOPROL-XL    30 tablet    TAKE 1 TABLET(25 MG) BY MOUTH DAILY    Hypertension goal BP (blood pressure) < 140/90       mometasone 220 MCG/INH Inhaler    ASMANEX 60 METERED DOSES    3 Inhaler    Inhale 1 puff into the lungs 2 times daily    SOB (shortness of breath)       order for DME     1 each    Class I Jobst compression stockings    Edema of both ankles       simvastatin 10 MG tablet    ZOCOR    90 tablet    TAKE 1 TABLET(10 MG) BY MOUTH AT BEDTIME    Hyperlipidemia LDL goal <100       tacrolimus 0.1 % ointment    PROTOPIC    60 g    Apply to the face daily, already failed desonide    AD (atopic dermatitis)       triamcinolone 55 MCG/ACT Inhaler    NASACORT     Spray 2 sprays into both nostrils daily    Seasonal allergic rhinitis       VITAMIN C PO      Take  by mouth.        VITAMIN D PO      Take  by mouth.

## 2018-07-26 NOTE — PATIENT INSTRUCTIONS
Rheumatology    Dr. Chris Capellan         [facility]   (Monday)  [address]  [place] [address]       [facility]   (Tuesday)  [address]  [place] [address]    [facility]   (Wed., Thurs., and Friday)  [address] [place] [address]    Phone number: [phone]  Thank you for choosing [facility].  Patricia Rizzo CMA

## 2018-07-26 NOTE — PROGRESS NOTES
Rheumatology Clinic Visit      Jorge Abarca MRN# [id number]   YOB: 1960 Age: 57 year old      Date of visit: 7/26/18   PCP: Dr. Coy Varela  Ophthalmology: [facility] in [place]     Chief Complaint   Patient presents with:  Arthritis: RA, patient states she is doing good. Left hand (fingers) are slightly better      Assessment and Plan   1. Seronegative nonerosive rheumatoid arthritis (RF negative, CCP negative): Symptoms began May 2015 and progressively worsened; initially with symmetric synovitis of the PIPs and MCPs and morning stiffness > 1 hour; steroid responsive. Currently on MTX 20mg PO weekly, folic acid 1mg daily, and HCQ 400mg daily.  Doing well today and will continue on the same regimen for now.   - Continue methotrexate 20mg once weekly  - Continue hydroxychloroquine 400mg daily (ophthalmology exam last on 1/16/2018 by Dr. Randy Ng at [facility] in [place])  - Continue folic acid 1mg daily  - Labs in 3 months: CBC, Creatinine, Hepatic Panel  - Labs in 6 months: CBC, Creatinine, Hepatic Panel, ESR, CRP     2. Left 3rd digit trigger finger: Steroid injection on 11/17/2015 with resolution for several months, then again in September 2016 with resolution of it again.  Doing well today.     3. Left second digit trigger finger: resolved with steroid injection and hand therapy exercises.  Doing well today.     4. History of DVT: reportedly associated with birth control; no recurrence reported    5. Bone Health: 11/2015 Vitamin D level normal.  Normal DEXA in 2011. Recent compression fracture and already seeing Cheryl Gautam for this who has ordered a DEXA; briefly discussed osteoporosis today. She is going to f/u with Cheryl Gautam for this.     Ms. Abarca verbalized agreement with and understanding of the rational for the diagnosis and treatment plan.  All questions were answered to best of my ability and the patient's satisfaction. Ms. Abarca was advised to contact the  clinic with any questions that may arise after the clinic visit.      Thank you for involving me in the care of the patient    Return to clinic: 3 months    HPI   Jorge Abarca is a 57 year old female with a medical history significant for GERD, diabetes, hypertension, hyperlipidemia, lumbar degenerative disc disease s/p L4-L5 microdiskectomy in 2013, left first toe fracture s/p nonsurgical treatment, allergic rhinitis (receiving allergy shots), asthma, and history of DVT who presents for f/u of seronegative rheumatoid arthritis.     Today, Ms. Abarca reports that she is doing great.  Trigger fingering is rare now.  Morning stiffness for no more than 10-20 minutes.  She is tolerating her medications well.  No issues today.    Denies fevers, chills, nausea, vomiting, diarrhea. Chronic on and off constipation. No abdominal pain. No chest pain/pressure, palpitations, or shortness of breath. No oral or nasal sores. No neck pain. No LE swelling. No dry mouth. Dry eyes doing well with infrequent use of artificial tears.  No eye pain.    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   SKIN: See history of present illness  HEENT:  No epistaxis. No oral or nasal ulcers.  CV: No chest pain, pressure, palpitations, or dyspnea on exertion.  PULM: No SOB, wheeze, cough.  GI:  No nausea, vomiting, diarrhea. See history of present illness. No blood in stool. No abdominal pain.    : No blood in urine.  MSK: See HPI.  NEURO: No numbness, tingling, or weakness.  EXT: No LE swelling    Active Problem List     Patient Active Problem List   Diagnosis     Hypertension goal BP (blood pressure) < 140/90     Allergy to mold spores     House dust mite allergy     Allergic rhinitis due to animal dander     Need for desensitization to allergens     Diagnostic skin and sensitization tests     Hyperlipidemia LDL goal <100     Degenerative disc disease, lumbar      Lumbar disc herniation     History of DVT (deep vein thrombosis)     Mild persistent asthma without complication     Seronegative rheumatoid arthritis (H)     High risk medications (not anticoagulants) long-term use     Trigger finger, left middle finger     Allergic rhinitis due to pollen     Type 2 diabetes mellitus without complication, without long-term current use of insulin (H)     Seasonal allergic rhinitis, unspecified allergic rhinitis trigger     Morbid obesity due to excess calories (H)     Grief     Advanced directives, counseling/discussion     Closed compression fracture of first lumbar vertebra, initial encounter (H)     Past Medical History     Past Medical History:   Diagnosis Date     Allergic rhinitis due to animal dander      Allergy to mold spores     7/03 skin tests per [facility]: pos. for cat/dog/CR/DM/M/T/G/W     Diabetes (H)      Diagnostic skin and sensitization tests 7/03 skin tests per [facility]: pos. for cat/dog/CR/DM/M/T/G/W     DVT (deep venous thrombosis) (H)      Eczema     sees [facility]     GERD (gastroesophageal reflux disease)      High cholesterol     occ.     House dust mite allergy      HTN (hypertension)      Indigestion     uses OTC Zantac prn     Mild persistent asthma      Morbid obesity due to excess calories (H)      Need for desensitization to allergens 9/03 started at [facility]. for: cat/dog/DM/M/T/G/W    start IT at [facility]: about 1/12      PONV (postoperative nausea and vomiting)      RA (rheumatoid arthritis) (H)      Seasonal allergic rhinitis      Past Surgical History     Past Surgical History:   Procedure Laterality Date     BACK SURGERY  2009     [facility] SURGICAL PATHOLOGY  1988    left foot     DISCECTOMY LUMBAR POSTERIOR MICROSCOPIC ONE LEVEL  3/18/2013    Procedure: DISCECTOMY LUMBAR POSTERIOR MICROSCOPIC ONE LEVEL;  Left Lumbar 4-5 Micro Discectomy;  Surgeon: Dallas Jensen MD;  Location: [facility]     TONSILLECTOMY & ADENOIDECTOMY  age 3     Allergy      Allergies   Allergen Reactions     Aleve [Naproxen Sodium] Hives     Hydralazine      Nexium [Esomeprazole Magnesium Trihydrate] Hives     HIVES     Shellfish Allergy Nausea     Sulfa Drugs      Stomach upset     Amlodipine Besylate Rash     Hctz Rash     Lisinopril Rash     Current Medication List     Current Outpatient Prescriptions   Medication Sig     Ascorbic Acid (VITAMIN C PO) Take  by mouth.     aspirin 81 MG tablet Take 1 tablet (81 mg) by mouth daily     Calcium Carbonate-Vit D-Min (CALCIUM 1200 PO) Take  by mouth.     cetirizine (ZYRTEC ALLERGY) 10 MG tablet Take 1-2 tablets (10-20 mg) by mouth daily as needed for allergies     Cholecalciferol (VITAMIN D PO) Take  by mouth.     CRANBERRY      FISH OIL      folic acid (FOLVITE) 1 MG tablet Take 1 tablet (1 mg) by mouth daily     hydroxychloroquine (PLAQUENIL) 200 MG tablet Take 1 tablet (200 mg) by mouth 2 times daily     hydrOXYzine (ATARAX) 25 MG tablet TAKE 1 TO 2 TABLETS BY MOUTH EVERY NIGHT AT BEDTIME AS NEEDED FOR ITCH     ibuprofen 200 MG capsule Take 200 mg by mouth every 4 hours as needed.     losartan (COZAAR) 100 MG tablet TAKE 1 TABLET (100 MG) BY MOUTH DAILY     metFORMIN (GLUCOPHAGE) 1000 MG tablet Take 1 tablet (1,000 mg) by mouth 2 times daily (with meals)     methotrexate sodium 2.5 MG TABS Take 20 mg by mouth once a week . Take all 8 tablets on the same day of each week.     metoprolol succinate (TOPROL-XL) 25 MG 24 hr tablet TAKE 1 TABLET(25 MG) BY MOUTH DAILY     mometasone (ASMANEX 60 METERED DOSES) 220 MCG/INH Inhaler Inhale 1 puff into the lungs 2 times daily     Multiple Vitamin (DAILY MULTIVITAMIN PO) Take  by mouth.     simvastatin (ZOCOR) 10 MG tablet TAKE 1 TABLET(10 MG) BY MOUTH AT BEDTIME     tacrolimus (PROTOPIC) 0.1 % ointment Apply to the face daily, already failed desonide     triamcinolone (NASACORT) 55 MCG/ACT nasal inhaler Spray 2 sprays into both nostrils daily     blood glucose monitoring (NO BRAND SPECIFIED) test  "strip Use to test blood sugar 1 times daily or as directed.  One touch ultra test strips     levalbuterol (XOPENEX HFA) 45 MCG/ACT inhaler Inhale 2 puffs into the lungs every 4 hours as needed (Patient not taking: Reported on 7/26/2018)     order for DME Class I Jobst compression stockings     [DISCONTINUED] amLODIPine (NORVASC) 5 MG tablet Take 1 tablet (5 mg) by mouth daily     No current facility-administered medications for this visit.      Social History   See HPI    Family History     Family History   Problem Relation Age of Onset     Cardiovascular Sister      atrial fibrillation     Cerebrovascular Disease Brother      carotid stenosis     Cancer Father      lung     Diabetes Mother      Breast Cancer Maternal Grandmother      also cousin on this side     Breast Cancer Paternal Aunt      C.A.D. No family hx of      Autoimmune Disease No family hx of      Physical Exam     Temp Readings from Last 3 Encounters:   06/13/18 98.4  F (36.9  C) (Oral)   01/03/18 97.7  F (36.5  C) (Oral)   11/14/17 97.7  F (36.5  C) (Oral)     BP Readings from Last 5 Encounters:   06/13/18 134/84   04/04/18 132/82   01/03/18 144/82   12/12/17 132/86   11/14/17 158/90     Pulse Readings from Last 1 Encounters:   07/26/18 82     Resp Readings from Last 1 Encounters:   07/26/18 16     Estimated body mass index is 38.58 kg/(m^2) as calculated from the following:    Height as of this encounter: 1.676 m (5' 6\").    Weight as of this encounter: 108.4 kg (239 lb).    GEN: NAD  HEENT: MMM. No oral lesions. Anicteric, non-injected sclera.  CV: S1, S2. RRR. No m/r/g.  PULM: CTA bilaterally. No w/c.  MSK: Hands, wrists, elbows, and shoulders without swelling or tenderness to palpation. Bump on the flexor tendon of the left third finger that moves with flexion/extension but does not cause triggering.  Hips nontender to direct palpation. Knees, ankles, and feet without swelling or tenderness to palpation. Negative MTP squeeze.   NEURO: UE and LE " strengths 5/5 and equal bilaterally.   SKIN: No rash  EXT: No LE edema  PSYCH: Alert. Appropriate.    Labs   RF/CCP  Recent Labs   Lab Test  11/10/15   0918  10/26/15   0850   CCPABY  <20  Interpretation:  Negative     --    RHF   --   <20     CBC  Recent Labs   Lab Test  07/23/18   1508  04/02/18   1333  12/19/17   0914   WBC  6.3  6.0  4.4   RBC  3.83  4.06  3.95   HGB  11.3*  12.2  11.8   HCT  35.0  37.2  35.6   MCV  91  92  90   RDW  16.6*  17.0*  15.7*   PLT  290  296  310   MCH  29.5  30.0  29.9   MCHC  32.3  32.8  33.1   NEUTROPHIL  60.5  62.7  59.2   LYMPH  22.9  19.7  20.5   MONOCYTE  8.4  8.2  6.9   EOSINOPHIL  7.9  9.1  12.9   BASOPHIL  0.3  0.3  0.5   ANEU  3.8  3.7  2.6   ALYM  1.4  1.2  0.9   KOKO  0.5  0.5  0.3   AEOS  0.5  0.5  0.6   ABAS  0.0  0.0  0.0     CMP  Recent Labs   Lab Test  07/23/18   1508  04/02/18   1333  12/19/17   0914   06/28/17   1221   11/23/16   0917   10/26/15   0850   NA   --    --    --    --   143   --   141   --   141   POTASSIUM   --    --    --    --   4.3   --   4.3   --   3.7   CHLORIDE   --    --    --    --   105   --   103   --   105   CO2   --    --    --    --   29   --   30   --   25   ANIONGAP   --    --    --    --   9   --   8   --   11   GLC   --    --    --    --   87   --   97   --   93   BUN   --    --    --    --   16   --   16   --   22   CR  0.91  0.96  0.78   < >  0.99   < >  0.81   < >  0.82   GFRESTIMATED  63  59*  76   < >  57*   < >  72   < >  71   GFRESTBLACK  76  72  >90   < >  69   < >  88   < >  86   OG   --    --    --    --   9.9   --   9.5   --   10.0   BILITOTAL  0.3  0.3  0.5   < >   --    < >   --    < >  0.3   ALBUMIN  4.0  3.9  3.8   < >   --    < >   --    < >  4.1   PROTTOTAL  7.5  7.4  7.3   < >   --    < >   --    < >  7.4   ALKPHOS  86  82  74   < >   --    < >   --    < >  79   AST  33  36  32   < >   --    < >   --    < >  25   ALT  36  36  34   < >   --    < >   --    < >  30    < > = values in this interval not displayed.      Calcium/VitaminD  Recent Labs   Lab Test  06/28/17   1221  11/23/16   0917  11/10/15   0918  10/26/15   0850   02/12/13   0722  12/20/11   0829   OG  9.9  9.5   --   10.0   < >  9.7  10.1   D3VIT   --    --    --    --    --    --   26   VITDT   --    --   50   --    --   23*   --     < > = values in this interval not displayed.     ESR/CRP  Recent Labs   Lab Test  07/23/18   1508  04/02/18   1333  12/19/17   0914   SED  16  12  11   CRP  3.6  <2.9  3.1     Lipid Panel  Recent Labs   Lab Test  12/19/17   0916  11/23/16   0917  12/21/15   0844  12/19/14   0947  10/15/14   0704  02/12/13   0722   CHOL  127  153  169  201*  200*  207*   TRIG  73  73  85  66  120  128   HDL  73  70  71  60  56  46*   LDL  39  68  81  128  120  135*   VLDL   --    --    --   13  24  26   CHOLHDLRATIO   --    --    --   3.4  3.6  4.5   NHDL  54  83  98   --    --    --      Hepatitis B  Recent Labs   Lab Test  12/21/15   0844   AUSAB  0.12   HBCAB  Nonreactive   HEPBANG  Nonreactive     Hepatitis C  Recent Labs   Lab Test  12/21/15   0844   HCVAB  Nonreactive   Assay performance characteristics have not been established for newborns,   infants, and children       HIV Screening  Recent Labs   Lab Test  12/21/15   0844   HIAGAB  Nonreactive   HIV-1 p24 Ag & HIV-1/HIV-2 Ab Not Detected       Immunization History     Immunization History   Administered Date(s) Administered     Pneumo Conj 13-V (2010&after) 06/01/2016     Pneumococcal 23 valent 09/07/2016     TD (ADULT, 7+) 12/06/1995, 12/06/2006     TDAP Vaccine (Adacel) 08/16/2017          Chart documentation done in part with Dragon Voice recognition Software. Although reviewed after completion, some word and grammatical error may remain.    Chris Capellan MD

## 2018-07-26 NOTE — NURSING NOTE
Patient to follow up with Primary Care provider regarding elevated blood pressure.  Blood pressure rechecked after visit   138/92  Patient had taken cold medicine today.  Patricia Rizzo CMA Rheumatology  7/26/2018 3:47 PM

## 2018-08-03 ENCOUNTER — RADIANT APPOINTMENT (OUTPATIENT)
Dept: BONE DENSITY | Facility: CLINIC | Age: 58
End: 2018-08-03
Attending: NURSE PRACTITIONER
Payer: COMMERCIAL

## 2018-08-03 DIAGNOSIS — M06.00 SERONEGATIVE RHEUMATOID ARTHRITIS (H): ICD-10-CM

## 2018-08-03 DIAGNOSIS — S32.010A CLOSED COMPRESSION FRACTURE OF FIRST LUMBAR VERTEBRA, INITIAL ENCOUNTER: ICD-10-CM

## 2018-08-03 PROCEDURE — 77080 DXA BONE DENSITY AXIAL: CPT | Performed by: INTERNAL MEDICINE

## 2018-08-06 PROBLEM — M80.00XA AGE-RELATED OSTEOPOROSIS WITH CURRENT PATHOLOGICAL FRACTURE: Status: ACTIVE | Noted: 2018-08-06

## 2018-08-07 ENCOUNTER — OFFICE VISIT (OUTPATIENT)
Dept: FAMILY MEDICINE | Facility: CLINIC | Age: 58
End: 2018-08-07
Payer: COMMERCIAL

## 2018-08-07 VITALS
DIASTOLIC BLOOD PRESSURE: 98 MMHG | HEIGHT: 66 IN | SYSTOLIC BLOOD PRESSURE: 176 MMHG | RESPIRATION RATE: 12 BRPM | OXYGEN SATURATION: 100 % | WEIGHT: 241 LBS | BODY MASS INDEX: 38.73 KG/M2 | TEMPERATURE: 97.9 F | HEART RATE: 79 BPM

## 2018-08-07 DIAGNOSIS — Z12.11 SCREEN FOR COLON CANCER: ICD-10-CM

## 2018-08-07 DIAGNOSIS — E11.9 TYPE 2 DIABETES MELLITUS WITHOUT COMPLICATION, WITHOUT LONG-TERM CURRENT USE OF INSULIN (H): Primary | ICD-10-CM

## 2018-08-07 DIAGNOSIS — M80.00XA AGE-RELATED OSTEOPOROSIS WITH CURRENT PATHOLOGICAL FRACTURE, INITIAL ENCOUNTER: ICD-10-CM

## 2018-08-07 DIAGNOSIS — M06.00 SERONEGATIVE RHEUMATOID ARTHRITIS (H): ICD-10-CM

## 2018-08-07 DIAGNOSIS — E78.5 HYPERLIPIDEMIA LDL GOAL <100: ICD-10-CM

## 2018-08-07 DIAGNOSIS — Z23 NEED FOR VACCINATION: ICD-10-CM

## 2018-08-07 DIAGNOSIS — E66.01 MORBID OBESITY DUE TO EXCESS CALORIES (H): ICD-10-CM

## 2018-08-07 DIAGNOSIS — I10 ESSENTIAL HYPERTENSION WITH GOAL BLOOD PRESSURE LESS THAN 140/90: ICD-10-CM

## 2018-08-07 PROBLEM — F43.21 GRIEF: Status: RESOLVED | Noted: 2017-08-16 | Resolved: 2018-08-07

## 2018-08-07 LAB — HBA1C MFR BLD: 6.5 % (ref 0–5.6)

## 2018-08-07 PROCEDURE — 80048 BASIC METABOLIC PNL TOTAL CA: CPT | Performed by: FAMILY MEDICINE

## 2018-08-07 PROCEDURE — 36415 COLL VENOUS BLD VENIPUNCTURE: CPT | Performed by: FAMILY MEDICINE

## 2018-08-07 PROCEDURE — 90746 HEPB VACCINE 3 DOSE ADULT IM: CPT | Performed by: FAMILY MEDICINE

## 2018-08-07 PROCEDURE — 83036 HEMOGLOBIN GLYCOSYLATED A1C: CPT | Performed by: FAMILY MEDICINE

## 2018-08-07 PROCEDURE — 90471 IMMUNIZATION ADMIN: CPT | Performed by: FAMILY MEDICINE

## 2018-08-07 PROCEDURE — 99214 OFFICE O/P EST MOD 30 MIN: CPT | Mod: 25 | Performed by: FAMILY MEDICINE

## 2018-08-07 RX ORDER — METOPROLOL SUCCINATE 50 MG/1
50 TABLET, EXTENDED RELEASE ORAL DAILY
Qty: 30 TABLET | Refills: 0 | Status: SHIPPED | OUTPATIENT
Start: 2018-08-07 | End: 2018-10-03

## 2018-08-07 RX ORDER — LOSARTAN POTASSIUM 100 MG/1
TABLET ORAL
Qty: 90 TABLET | Refills: 3 | Status: SHIPPED | OUTPATIENT
Start: 2018-08-07 | End: 2019-08-27

## 2018-08-07 RX ORDER — ALENDRONATE SODIUM 70 MG/1
TABLET ORAL
Qty: 12 TABLET | Refills: 3 | Status: SHIPPED | OUTPATIENT
Start: 2018-08-07 | End: 2019-06-29

## 2018-08-07 NOTE — MR AVS SNAPSHOT
After Visit Summary   8/7/2018    Jorge Abarca    MRN: [id number]           Patient Information     Date Of Birth          1960        Visit Information        Provider Department      8/7/2018 3:20 PM Paula Alarcon MD [facility]        Today's Diagnoses     Type 2 diabetes mellitus without complication, without long-term current use of insulin (H)    -  1    Morbid obesity due to excess calories (H)        Essential hypertension with goal blood pressure less than 140/90        Seronegative rheumatoid arthritis (H)        Age-related osteoporosis with current pathological fracture, initial encounter        Hyperlipidemia LDL goal <100        Screen for colon cancer        Need for vaccination          Care Instructions    Did you know you can lower your blood pressure with your daily habits?    *Losing 20 pounds of weight lowers blood pressure 5 to 20 points.  *Eating a low-fat diet rich in fruits, vegetables and low-fat dairy lowers blood pressure 8 to 14 points.  *Eating a low-salt diet lowers blood pressure 2 to 8 points.  *Exercising regularly lowers blood pressure 4 to 9 points.  *Reducing alcohol use lowers blood pressure 2 to 4 points.      [facility] (formerly [facility])    If you have any questions regarding to your visit please contact your care team:       Team Red:   Clinic Hours Telephone Number   Dr. Paula Gautam, NP   7am-7pm  Monday - Thursday   7am-5pm  Fridays  [phone]  (Appointment scheduling available 24/7)    Questions about your recent visit?   Team Line  [phone]   Urgent Care - [place] - 11am-9pm Monday-Friday Saturday-Sunday- 9am-5pm   [place] - 5pm-9pm Monday-Friday Saturday-Sunday- 9am-5pm  [phone] - Fabiola Marques  [phone] - [place]       What options do I have for a visit other than an office visit? We offer electronic visits (e-visits) and telephone  visits, when medically appropriate.  Please check with your medical insurance to see if these types of visits are covered, as you will be responsible for any charges that are not paid by your insurance.      You can use [email or web address] (secure electronic communication) to access to your chart, send your primary care provider a message, or make an appointment. Ask a team member how to get started.     For a price quote for your services, please call our Consumer Price Line at [phone] or our Imaging Cost estimation line at [phone] (for imaging tests).              Follow-ups after your visit        Follow-up notes from your care team     Return in about 2 weeks (around 8/21/2018) for free pharmacy blood pressure check (walk-in).      Your next 10 appointments already scheduled     Oct 26, 2018 11:00 AM CDT   LAB with [facility]   [facility] [place] ([facility])    [address] [place] [address]  [place] [address]   [phone]           Please do not eat 10-12 hours before your appointment if you are coming in fasting for labs on lipids, cholesterol, or glucose (sugar). This does not apply to pregnant women. Water, hot tea and black coffee (with nothing added) are okay. Do not drink other fluids, diet soda or chew gum.            Jan 21, 2019 11:00 AM CST   LAB with [facility]   [facility] ([facility])    [address] [place] [address]  [place] [address]   [phone]           Please do not eat 10-12 hours before your appointment if you are coming in fasting for labs on lipids, cholesterol, or glucose (sugar). This does not apply to pregnant women. Water, hot tea and black coffee (with nothing added) are okay. Do not drink other fluids, diet soda or chew gum.            Jan 24, 2019 11:00 AM CST   Return Visit with [facility]ellan MD   [facility] ([facility])    [address] [place] [address]  [place] [address]   [phone]              Future tests that were  "ordered for you today     Open Future Orders        Priority Expected Expires Ordered    Fecal colorectal cancer screen FIT - Future (S+30) Routine 8/7/2018 8/7/2019 8/7/2018            Who to contact     If you have questions or need follow up information about today's clinic visit or your schedule please contact [facility] directly at [phone].  Normal or non-critical lab and imaging results will be communicated to you by [employer], letter or phone within 4 business days after the clinic has received the results. If you do not hear from us within 7 days, please contact the clinic through [employer] or phone. If you have a critical or abnormal lab result, we will notify you by phone as soon as possible.  Submit refill requests through [employer] or call your pharmacy and they will forward the refill request to us. Please allow 3 business days for your refill to be completed.          Additional Information About Your Visit        [employer] Information     [employer] gives you secure access to your electronic health record. If you see a primary care provider, you can also send messages to your care team and make appointments. If you have questions, please call your primary care clinic.  If you do not have a primary care provider, please call [phone] and they will assist you.        Care EveryWhere ID     This is your Care EveryWhere ID. This could be used by other organizations to access your [facility] medical records  [id number]        Your Vitals Were     Pulse Temperature Respirations Height Last Period Pulse Oximetry    79 97.9  F (36.6  C) (Oral) 12 5' 6\" (1.676 m) 03/04/2011 100%    Breastfeeding? BMI (Body Mass Index)                No 38.9 kg/m2           Blood Pressure from Last 3 Encounters:   08/07/18 (!) 176/98   07/26/18 (!) 138/92   06/13/18 134/84    Weight from Last 3 Encounters:   08/07/18 241 lb (109.3 kg)   07/26/18 239 lb (108.4 kg)   06/13/18 238 lb (108 kg)              We Performed " the Following     BASIC METABOLIC PANEL     Hemoglobin A1c     HEPATITIS B VACCINE,ADULT,IM          Today's Medication Changes          These changes are accurate as of 8/7/18  4:03 PM.  If you have any questions, ask your nurse or doctor.               Start taking these medicines.        Dose/Directions    alendronate 70 MG tablet   Commonly known as:  FOSAMAX   Used for:  Age-related osteoporosis with current pathological fracture, initial encounter   Started by:  Paula Alarcon MD        Take 1 tablet (70 mg) by mouth with 8oz water every 7 days 30 minutes before breakfast and remain upright during this time.   Quantity:  12 tablet   Refills:  3         These medicines have changed or have updated prescriptions.        Dose/Directions    metoprolol succinate 50 MG 24 hr tablet   Commonly known as:  TOPROL-XL   This may have changed:    - medication strength  - how much to take  - how to take this  - when to take this  - additional instructions   Used for:  Essential hypertension with goal blood pressure less than 140/90   Changed by:  Paula Alarcon MD        Dose:  50 mg   Take 1 tablet (50 mg) by mouth daily   Quantity:  30 tablet   Refills:  0            Where to get your medicines      These medications were sent to [facility] - [place], [place] - [address] AT [address] [facility]  [facility], [facility] [address]     Phone:  [phone]     alendronate 70 MG tablet    losartan 100 MG tablet    metFORMIN 1000 MG tablet    metoprolol succinate 50 MG 24 hr tablet                Primary Care Provider Office Phone # Fax #    Paula Alarcon [phone]       [address] [facility] [address]        Equal Access to Services     [facility] AH: Hadkayce hitchcocko Sosrikanth, waaxda luqadaha, qaybta kaalmada opal, kodi denis. So [facility] [phone].    ATENCIÓN: Si stuartla español, tiene a murphy disposición  servicios gratuitos de asistencia lingüística. Yuri van [phone].    We comply with applicable federal civil rights laws and [place] laws. We do not discriminate on the basis of race, color, national origin, age, disability, sex, sexual orientation, or gender identity.            Thank you!     Thank you for choosing [facility]  for your care. Our goal is always to provide you with excellent care. Hearing back from our patients is one way we can continue to improve our services. Please take a few minutes to complete the written survey that you may receive in the mail after your visit with us. Thank you!             Your Updated Medication List - Protect others around you: Learn how to safely use, store and throw away your medicines at www.disposemymeds.org.          This list is accurate as of 8/7/18  4:03 PM.  Always use your most recent med list.                   Brand Name Dispense Instructions for use Diagnosis    alendronate 70 MG tablet    FOSAMAX    12 tablet    Take 1 tablet (70 mg) by mouth with 8oz water every 7 days 30 minutes before breakfast and remain upright during this time.    Age-related osteoporosis with current pathological fracture, initial encounter       aspirin 81 MG tablet     30 tablet    Take 1 tablet (81 mg) by mouth daily    Type 2 diabetes mellitus without complication, without long-term current use of insulin (H)       blood glucose monitoring test strip    no brand specified    3 Month    Use to test blood sugar 1 times daily or as directed. One touch ultra test strips    DM type 2, goal A1c below 7       CALCIUM 1200 PO      Take  by mouth.        cetirizine 10 MG tablet    ZYRTEC ALLERGY    60 tablet    Take 1-2 tablets (10-20 mg) by mouth daily as needed for allergies    Itchy skin       CRANBERRY           DAILY MULTIVITAMIN PO      Take  by mouth.        FISH OIL           folic acid 1 MG tablet    FOLVITE    100 tablet    Take 1 tablet (1 mg) by mouth daily     Seronegative rheumatoid arthritis (H)       hydroxychloroquine 200 MG tablet    PLAQUENIL    180 tablet    Take 1 tablet (200 mg) by mouth 2 times daily    Seronegative rheumatoid arthritis (H)       hydrOXYzine 25 MG tablet    ATARAX    60 tablet    TAKE 1 TO 2 TABLETS BY MOUTH EVERY NIGHT AT BEDTIME AS NEEDED FOR ITCH    Itchy skin       ibuprofen 200 MG capsule      Take 200 mg by mouth every 4 hours as needed.        levalbuterol 45 MCG/ACT Inhaler    XOPENEX HFA    1 Inhaler    Inhale 2 puffs into the lungs every 4 hours as needed    Mild persistent asthma, uncomplicated       losartan 100 MG tablet    COZAAR    90 tablet    TAKE 1 TABLET (100 MG) BY MOUTH DAILY    Essential hypertension with goal blood pressure less than 140/90       metFORMIN 1000 MG tablet    GLUCOPHAGE    180 tablet    Take 1 tablet (1,000 mg) by mouth 2 times daily (with meals)    Type 2 diabetes mellitus without complication, without long-term current use of insulin (H)       methotrexate sodium 2.5 MG Tabs     96 tablet    Take 20 mg by mouth once a week . Take all 8 tablets on the same day of each week.    Seronegative rheumatoid arthritis (H)       metoprolol succinate 50 MG 24 hr tablet    TOPROL-XL    30 tablet    Take 1 tablet (50 mg) by mouth daily    Essential hypertension with goal blood pressure less than 140/90       mometasone 220 MCG/INH Inhaler    ASMANEX 60 METERED DOSES    3 Inhaler    Inhale 1 puff into the lungs 2 times daily    SOB (shortness of breath)       simvastatin 10 MG tablet    ZOCOR    90 tablet    TAKE 1 TABLET(10 MG) BY MOUTH AT BEDTIME    Hyperlipidemia LDL goal <100       tacrolimus 0.1 % ointment    PROTOPIC    60 g    Apply to the face daily, already failed desonide    AD (atopic dermatitis)       triamcinolone 55 MCG/ACT Inhaler    NASACORT     Spray 2 sprays into both nostrils daily as needed    Seasonal allergic rhinitis       VITAMIN C PO      Take  by mouth.        VITAMIN D PO      Take  by  mouth.

## 2018-08-07 NOTE — PATIENT INSTRUCTIONS
Did you know you can lower your blood pressure with your daily habits?    *Losing 20 pounds of weight lowers blood pressure 5 to 20 points.  *Eating a low-fat diet rich in fruits, vegetables and low-fat dairy lowers blood pressure 8 to 14 points.  *Eating a low-salt diet lowers blood pressure 2 to 8 points.  *Exercising regularly lowers blood pressure 4 to 9 points.  *Reducing alcohol use lowers blood pressure 2 to 4 points.      [facility]    If you have any questions regarding to your visit please contact your care team:       Team Red:   Clinic Hours Telephone Number   Dr. Paula Gautam, NP   7am-7pm  Monday - Thursday   7am-5pm  Fridays  [phone]  (Appointment scheduling available 24/7)    Questions about your recent visit?   Team Line  [phone]   Urgent Care - [place] and [facility] - 11am-9pm Monday-Friday Saturday-Sunday- 9am-5pm   [place] - 5pm-9pm Monday-Friday Saturday-Sunday- 9am-5pm  [phone] - [place]  [phone] - [place]       What options do I have for a visit other than an office visit? We offer electronic visits (e-visits) and telephone visits, when medically appropriate.  Please check with your medical insurance to see if these types of visits are covered, as you will be responsible for any charges that are not paid by your insurance.      You can use [email or web address] (secure electronic communication) to access to your chart, send your primary care provider a message, or make an appointment. Ask a team member how to get started.     For a price quote for your services, please call our Consumer Price Line at [phone] or our Imaging Cost estimation line at [phone] (for imaging tests).

## 2018-08-07 NOTE — LETTER
My Asthma Action Plan  Name: Jorge Abarca   YOB: 1960  Date: 8/7/2018   My doctor: Paula Alarcon MD   My clinic: [facility]        My Control Medicine: Mometasone (Asmanex) -  Twisthaler 220 mcg 1 puff twice daily  My Rescue Medicine: Albuterol (Proair/Ventolin/Proventil) inhaler 2 puffs every 4 hours as needed   My Asthma Severity: mild persistent  Avoid your asthma triggers: upper respiratory infections  None            GREEN ZONE   Good Control    I feel good    No cough or wheeze    Can work, sleep and play without asthma symptoms       Take your asthma control medicine every day.     1. If exercise triggers your asthma, take your rescue medication    15 minutes before exercise or sports, and    During exercise if you have asthma symptoms  2. Spacer to use with inhaler: If you have a spacer, make sure to use it with your inhaler             YELLOW ZONE Getting Worse  I have ANY of these:    I do not feel good    Cough or wheeze    Chest feels tight    Wake up at night   1. Keep taking your Green Zone medications  2. Start taking your rescue medicine:    every 20 minutes for up to 1 hour. Then every 4 hours for 24-48 hours.  3. If you stay in the Yellow Zone for more than 12-24 hours, contact your doctor.  4. If you do not return to the Green Zone in 12-24 hours or you get worse, start taking your oral steroid medicine if prescribed by your provider.           RED ZONE Medical Alert - Get Help  I have ANY of these:    I feel awful    Medicine is not helping    Breathing getting harder    Trouble walking or talking    Nose opens wide to breathe       1. Take your rescue medicine NOW  2. If your provider has prescribed an oral steroid medicine, start taking it NOW  3. Call your doctor NOW  4. If you are still in the Red Zone after 20 minutes and you have not reached your doctor:    Take your rescue medicine again and    Call 911 or go to the emergency room right away    See your  regular doctor within 2 weeks of an Emergency Room or Urgent Care visit for follow-up treatment.          Annual Reminders:  Meet with Asthma Educator,  Flu Shot in the Fall, consider Pneumonia Vaccination for patients with asthma (aged 19 and older).    Pharmacy:    [facility] [address] [place] [address] [place], [place] - [address] [facility] [address]  [facility] - [place], [place] - [address] AT PORTAL TO REGISTERED [facility] SITES                      Asthma Triggers  How To Control Things That Make Your Asthma Worse    Triggers are things that make your asthma worse.  Look at the list below to help you find your triggers and what you can do about them.  You can help prevent asthma flare-ups by staying away from your triggers.      Trigger                                                          What you can do   Cigarette Smoke  Tobacco smoke can make asthma worse. Do not allow smoking in your home, car or around you.  Be sure no one smokes at a child s day care or school.  If you smoke, ask your health care provider for ways to help you quit.  Ask family members to quit too.  Ask your health care provider for a referral to Quit Plan to help you quit smoking, or call 7-768-948-PLAN.     Colds, Flu, Bronchitis  These are common triggers of asthma. Wash your hands often.  Don t touch your eyes, nose or mouth.  Get a flu shot every year.     Dust Mites  These are tiny bugs that live in cloth or carpet. They are too small to see. Wash sheets and blankets in hot water every week.   Encase pillows and mattress in dust mite proof covers.  Avoid having carpet if you can. If you have carpet, vacuum weekly.   Use a dust mask and HEPA vacuum.   Pollen and Outdoor Mold  Some people are allergic to trees, grass, or weed pollen, or molds. Try to keep your windows closed.  Limit time out doors when pollen count is high.   Ask you health care provider about taking medicine during allergy  season.     Animal Dander  Some people are allergic to skin flakes, urine or saliva from pets with fur or feathers. Keep pets with fur or feathers out of your home.    If you can t keep the pet outdoors, then keep the pet out of your bedroom.  Keep the bedroom door closed.  Keep pets off cloth furniture and away from stuffed toys.     Mice, Rats, and Cockroaches  Some people are allergic to the waste from these pests.   Cover food and garbage.  Clean up spills and food crumbs.  Store grease in the refrigerator.   Keep food out of the bedroom.   Indoor Mold  This can be a trigger if your home has high moisture. Fix leaking faucets, pipes, or other sources of water.   Clean moldy surfaces.  Dehumidify basement if it is damp and smelly.   Smoke, Strong Odors, and Sprays  These can reduce air quality. Stay away from strong odors and sprays, such as perfume, powder, hair spray, paints, smoke incense, paint, cleaning products, candles and new carpet.   Exercise or Sports  Some people with asthma have this trigger. Be active!  Ask your doctor about taking medicine before sports or exercise to prevent symptoms.    Warm up for 5-10 minutes before and after sports or exercise.     Other Triggers of Asthma  Cold air:  Cover your nose and mouth with a scarf.  Sometimes laughing or crying can be a trigger.  Some medicines and food can trigger asthma.

## 2018-08-07 NOTE — PROGRESS NOTES
SUBJECTIVE:   Jorge Abarca is a 57 year old female who presents to clinic today for the following health issues:    Diabetes Follow-up      Patient is checking blood sugars: not at all    Diabetic concerns: None     Symptoms of hypoglycemia (low blood sugar): none     Paresthesias (numbness or burning in feet) or sores: No     Date of last diabetic eye exam: 1/2018    Diabetes Management Resources    Hyperlipidemia Follow-Up      Rate your low fat/cholesterol diet?: good    Taking statin?  Yes, no muscle aches from statin    Other lipid medications/supplements?:  none    Hypertension Follow-up      Outpatient blood pressures are being checked at home.  Results are normal range.    Low Salt Diet: low salt    BP Readings from Last 2 Encounters:   08/07/18 (!) 176/98   07/26/18 (!) 138/92     Hemoglobin A1C (%)   Date Value   08/07/2018 6.5 (H)   12/19/2017 6.4 (H)     LDL Cholesterol Calculated (mg/dL)   Date Value   12/19/2017 39   11/23/2016 68       Amount of exercise or physical activity: None    Problems taking medications regularly: No    Medication side effects: none    Diet: low salt, low fat/cholesterol and diabetic    She was recently diagnosed with osteoporosis after a compression fracture; symptoms have resolved.     I have reviewed the patient's medical history in detail and updated the computerized patient record.     ROS:  CONSTITUTIONAL: NEGATIVE for fever, chills, change in weight  INTEGUMENTARY/SKIN: NEGATIVE for worrisome rashes, moles or lesions  EYES: NEGATIVE for vision changes or irritation  RESP: NEGATIVE for significant cough or SOB  CV: NEGATIVE for chest pain, palpitations or peripheral edema  GI: NEGATIVE for nausea, abdominal pain, heartburn, or change in bowel habits  : NEGATIVE for frequency, dysuria, or hematuria  MUSCULOSKELETAL: NEGATIVE for significant arthralgias or myalgia  NEURO: NEGATIVE for weakness, dizziness or paresthesias  ENDOCRINE: Hx diabetes  PSYCHIATRIC:  "NEGATIVE for changes in mood or affect    OBJECTIVE:     BP (!) 176/98  Pulse 79  Temp 97.9  F (36.6  C) (Oral)  Resp 12  Ht 5' 6\" (1.676 m)  Wt 241 lb (109.3 kg)  LMP 03/04/2011  SpO2 100%  Breastfeeding? No  BMI 38.9 kg/m2  Body mass index is 38.9 kg/(m^2).  GENERAL: alert, no distress and obese  NECK: no adenopathy, no asymmetry, masses, or scars and thyroid normal to palpation  RESP: lungs clear to auscultation - no rales, rhonchi or wheezes  CV: regular rate and rhythm, normal S1 S2, no S3 or S4, no murmur, click or rub   MS: no gross musculoskeletal defects noted, no edema  PSYCH: mentation appears normal, affect normal/bright  Diabetic foot exam: no trophic changes or ulcerative lesions and normal monofilament exam    Diagnostic Test Results:  Results for orders placed or performed in visit on 08/07/18   Hemoglobin A1c   Result Value Ref Range    Hemoglobin A1C 6.5 (H) 0 - 5.6 %       ASSESSMENT/PLAN:   (E11.9) Type 2 diabetes mellitus without complication, without long-term current use of insulin (H)  (primary encounter diagnosis)  (E66.01) Morbid obesity due to excess calories (H)  Comment: Well controlled with medications without side effects.     (I10) Essential hypertension with goal blood pressure less than 140/90  Comment: uncontrolled   Plan: losartan (COZAAR) 100 MG tablet, metoprolol         succinate (TOPROL-XL) 50 MG 24 hr tablet        Increase dose of metoprolol. Follow up ancillary blood pressure recheck in one month or sooner for worsening of symptoms or side effects.      (M06.00) Seronegative rheumatoid arthritis (H)  Comment: under care of rheumatology     (M80.00XA) Age-related osteoporosis with current pathological fracture, initial encounter  Plan: alendronate (FOSAMAX) 70 MG tablet        Discussed risks and benefits of this medication. Continue vitamin D supplement, regular exercise, falls precautions and DEXA bone scan every 2 - 3 years.     (E78.5) Hyperlipidemia LDL goal " <100  Comment: Well controlled with medications without side effects.       See Patient Instructions    Paula Alarcon MD  [facility]

## 2018-08-08 LAB
ANION GAP SERPL CALCULATED.3IONS-SCNC: 8 MMOL/L (ref 3–14)
BUN SERPL-MCNC: 21 MG/DL (ref 7–30)
CALCIUM SERPL-MCNC: 9.7 MG/DL (ref 8.5–10.1)
CHLORIDE SERPL-SCNC: 105 MMOL/L (ref 94–109)
CO2 SERPL-SCNC: 27 MMOL/L (ref 20–32)
CREAT SERPL-MCNC: 0.91 MG/DL (ref 0.52–1.04)
GFR SERPL CREATININE-BSD FRML MDRD: 63 ML/MIN/1.7M2
GLUCOSE SERPL-MCNC: 96 MG/DL (ref 70–99)
POTASSIUM SERPL-SCNC: 4.4 MMOL/L (ref 3.4–5.3)
SODIUM SERPL-SCNC: 140 MMOL/L (ref 133–144)

## 2018-08-08 ASSESSMENT — ASTHMA QUESTIONNAIRES: ACT_TOTALSCORE: 25

## 2018-09-24 ENCOUNTER — TELEPHONE (OUTPATIENT)
Dept: FAMILY MEDICINE | Facility: CLINIC | Age: 58
End: 2018-09-24

## 2018-09-24 NOTE — TELEPHONE ENCOUNTER
Called patient no answer left message to return call.   Red team number left.   Last wellness visit was: 10/26/2017 so please schedule on or after this date.  Thank you  Khloe

## 2018-09-24 NOTE — TELEPHONE ENCOUNTER
----- Message from Palua Alarcon MD sent at 9/24/2018  9:49 AM CDT -----  Regarding: Panel Management  Schedule ancillary blood pressure check ASAP and wellness visit in October  Paula Alarcon MD

## 2018-09-27 NOTE — TELEPHONE ENCOUNTER
Panel Management Review      Patient has the following on her problem list:     Asthma review     ACT Total Scores 8/7/2018   ACT TOTAL SCORE -   ASTHMA ER VISITS -   ASTHMA HOSPITALIZATIONS -   ACT TOTAL SCORE (Goal Greater than or Equal to 20) 25   In the past 12 months, how many times did you visit the emergency room for your asthma without being admitted to the hospital? 0   In the past 12 months, how many times were you hospitalized overnight because of your asthma? 0      1. Is Asthma diagnosis on the Problem List? Yes    2. Is Asthma listed on Health Maintenance? Yes    3. Patient is due for:  none    Diabetes    ASA: Passed    Last A1C  Lab Results   Component Value Date    A1C 6.5 08/07/2018    A1C 6.4 12/19/2017    A1C 5.7 08/16/2017    A1C 6.0 11/23/2016    A1C 6.1 10/26/2015     A1C tested: Passed    Last LDL:    Lab Results   Component Value Date    CHOL 127 12/19/2017     Lab Results   Component Value Date    HDL 73 12/19/2017     Lab Results   Component Value Date    LDL 39 12/19/2017     Lab Results   Component Value Date    TRIG 73 12/19/2017     Lab Results   Component Value Date    CHOLHDLRATIO 3.4 12/19/2014     Lab Results   Component Value Date    NHDL 54 12/19/2017       Is the patient on a Statin? YES             Is the patient on Aspirin? YES    Medications     HMG CoA Reductase Inhibitors    simvastatin (ZOCOR) 10 MG tablet    Salicylates    aspirin 81 MG tablet          Last three blood pressure readings:  BP Readings from Last 3 Encounters:   08/07/18 (!) 176/98   07/26/18 (!) 138/92   06/13/18 134/84       Date of last diabetes office visit: 8-7-18     Tobacco History:     History   Smoking Status     Never Smoker   Smokeless Tobacco     Never Used         Hypertension   Last three blood pressure readings:  BP Readings from Last 3 Encounters:   08/07/18 (!) 176/98   07/26/18 (!) 138/92   06/13/18 134/84     Blood pressure: FAILED    HTN Guidelines:  Age 18-59 BP range:  Less than  140/90  Age 60-85 with Diabetes:  Less than 140/90  Age 60-85 without Diabetes:  less than 150/90      Composite cancer screening  Chart review shows that this patient is due/due soon for the following Mammogram and Fecal Colorectal (FIT)  Summary:    Patient is due/failing the following:   BP CHECK, FIT and MAMMOGRAM    Action needed:   Patient needs office visit for Ancillary blood pressure / mammogram/ fit.    Type of outreach:    Appointment scheduled for blood pressure 10-3-18, mammo scheudled for 10-2-18    Questions for provider review:    None                                                                                                                                    Marie JAIME MA

## 2018-10-03 ENCOUNTER — ALLIED HEALTH/NURSE VISIT (OUTPATIENT)
Dept: NURSING | Facility: CLINIC | Age: 58
End: 2018-10-03
Payer: COMMERCIAL

## 2018-10-03 VITALS — DIASTOLIC BLOOD PRESSURE: 86 MMHG | SYSTOLIC BLOOD PRESSURE: 136 MMHG | HEART RATE: 88 BPM | OXYGEN SATURATION: 97 %

## 2018-10-03 DIAGNOSIS — Z01.30 BP CHECK: Primary | ICD-10-CM

## 2018-10-03 DIAGNOSIS — I10 ESSENTIAL HYPERTENSION WITH GOAL BLOOD PRESSURE LESS THAN 140/90: ICD-10-CM

## 2018-10-03 RX ORDER — METOPROLOL SUCCINATE 50 MG/1
50 TABLET, EXTENDED RELEASE ORAL DAILY
Qty: 90 TABLET | Refills: 3 | Status: SHIPPED | OUTPATIENT
Start: 2018-10-03 | End: 2019-08-27

## 2018-10-03 RX ORDER — METOPROLOL SUCCINATE 50 MG/1
50 TABLET, EXTENDED RELEASE ORAL DAILY
Qty: 30 TABLET | Refills: 0 | Status: CANCELLED | OUTPATIENT
Start: 2018-10-03

## 2018-10-03 NOTE — NURSING NOTE
Jorge Abarca is a 58 year old patient who comes in today for a Blood Pressure check.  Initial BP:  /86  Pulse 88  LMP 03/04/2011  SpO2 97%     88  Disposition: results routed to provider  Robyn GARCIA CMA ([facility])

## 2018-10-03 NOTE — MR AVS SNAPSHOT
After Visit Summary   10/3/2018    Jorge Abarca    MRN: [id number]           Patient Information     Date Of Birth          1960        Visit Information        Provider Department      10/3/2018 2:00 PM [facility] ANCILLARY [facility]        Today's Diagnoses     BP check    -  1       Follow-ups after your visit        Your next 10 appointments already scheduled     Oct 16, 2018  9:45 AM CDT   MA SCREENING DIGITAL BILATERAL with FKMA1   [facility]    [address] [place] [id number] [phone]           How do I prepare for my exam? (Food and drink instructions) No Food and Drink Restrictions.  How do I prepare for my exam? (Other instructions) Do not use any powder, lotion or deodorant under your arms or on your breast. If you do, we will ask you to remove it before your exam.  What should I wear: Wear comfortable, two-piece clothing.  How long does the exam take: Most scans will take 15 minutes.  What should I bring: Bring any previous mammograms from other facilities or have them mailed to the breast center.  Do I need a :  No  is needed.  What do I need to tell my doctor: If you have any allergies, tell your care team.  What should I do after the exam: No restrictions, You may resume normal activities.  What is this test: This test is an x-ray of the breast to look for breast disease. The breast is pressed between two plates to flatten and spread the tissue. An X-ray is taken of the breast from different angles.  Who should I call with questions: If you have any questions, please call the Imaging Department where you will have your exam. Directions, parking instructions, and other information is available on our website, [email or web address].  Other information about my exam Three-dimensional (3D) mammograms are available at [facility] locations in [facility], [facility],  "[facility] and [place].  Health locations include [place] and the [facility] in [place].  Benefits of 3D mammograms include * Improved rate of cancer detection * Decreases your chance of having to go back for more tests, which means fewer: * \"False-positive\" results (This means that there is an abnormal area but it isn't cancer.) * Invasive testing procedures, such as a biopsy or surgery * Can provide clearer images of the breast if you have dense breast tissue.  *3D mammography is an optional exam that anyone can have with a 2D mammogram. It doesn't replace or take the place of a 2D mammogram. 2D mammograms remain an effective screening test for all women.  Not all insurance companies cover the cost of a 3D mammogram. Check with your insurance.            Oct 26, 2018 11:00 AM CDT   LAB with [facility] LAB   [facility] [place] ([facility])    [address] [place] [address]  [place] [address]   [phone]           Please do not eat 10-12 hours before your appointment if you are coming in fasting for labs on lipids, cholesterol, or glucose (sugar). This does not apply to pregnant women. Water, hot tea and black coffee (with nothing added) are okay. Do not drink other fluids, diet soda or chew gum.            Jan 21, 2019 11:00 AM CST   LAB with [facility] LAB   [facility] [place] ([facility])    [address] [place] [address]  [place] [address]   [phone]           Please do not eat 10-12 hours before your appointment if you are coming in fasting for labs on lipids, cholesterol, or glucose (sugar). This does not apply to pregnant women. Water, hot tea and black coffee (with nothing added) are okay. Do not drink other fluids, diet soda or chew gum.            Jan 24, 2019 11:00 AM CST   Return Visit with Chris Capellan MD   [facility] ([facility])    [address] [place], [place] [address] [address]   [phone]              Who to " contact     If you have questions or need follow up information about today's clinic visit or your schedule please contact [facility] directly at [phone].  Normal or non-critical lab and imaging results will be communicated to you by MyChart, letter or phone within 4 business days after the clinic has received the results. If you do not hear from us within 7 days, please contact the clinic through [employer] or phone. If you have a critical or abnormal lab result, we will notify you by phone as soon as possible.  Submit refill requests through [employer] or call your pharmacy and they will forward the refill request to us. Please allow 3 business days for your refill to be completed.          Additional Information About Your Visit        [employer] Information     [employer] gives you secure access to your electronic health record. If you see a primary care provider, you can also send messages to your care team and make appointments. If you have questions, please call your primary care clinic.  If you do not have a primary care provider, please call [phone] and they will assist you.        Care EveryWhere ID     This is your Care EveryWhere ID. This could be used by other organizations to access your [facility] medical records  [id number]        Your Vitals Were     Pulse Last Period Pulse Oximetry             88 03/04/2011 97%          Blood Pressure from Last 3 Encounters:   10/03/18 136/86   08/07/18 (!) 176/98   07/26/18 (!) 138/92    Weight from Last 3 Encounters:   08/07/18 241 lb (109.3 kg)   07/26/18 239 lb (108.4 kg)   06/13/18 238 lb (108 kg)              Today, you had the following     No orders found for display       Primary Care Provider Office Phone # Fax #    Paula Alarcon [phone]       [address] [place] [address] [place] [address]        Equal Access to Services     CU[employer] AVALOS AH: Jc Steel, waurban ba, qaybta kodi graf  cristofer denis. So [facility] [phone].    ATENCIÓN: Si steve salazar, tiene a murphy disposición servicios gratuitos de asistencia lingüística. Yuri van [phone].    We comply with applicable federal civil rights laws and [place] laws. We do not discriminate on the basis of race, color, national origin, age, disability, sex, sexual orientation, or gender identity.            Thank you!     Thank you for choosing [facility]  for your care. Our goal is always to provide you with excellent care. Hearing back from our patients is one way we can continue to improve our services. Please take a few minutes to complete the written survey that you may receive in the mail after your visit with us. Thank you!             Your Updated Medication List - Protect others around you: Learn how to safely use, store and throw away your medicines at www.disposemymeds.org.          This list is accurate as of 10/3/18  2:46 PM.  Always use your most recent med list.                   Brand Name Dispense Instructions for use Diagnosis    alendronate 70 MG tablet    FOSAMAX    12 tablet    Take 1 tablet (70 mg) by mouth with 8oz water every 7 days 30 minutes before breakfast and remain upright during this time.    Age-related osteoporosis with current pathological fracture, initial encounter       aspirin 81 MG tablet     30 tablet    Take 1 tablet (81 mg) by mouth daily    Type 2 diabetes mellitus without complication, without long-term current use of insulin (H)       blood glucose monitoring test strip    no brand specified    3 Month    Use to test blood sugar 1 times daily or as directed. One touch ultra test strips    DM type 2, goal A1c below 7       CALCIUM 1200 PO      Take  by mouth.        cetirizine 10 MG tablet    ZYRTEC ALLERGY    60 tablet    Take 1-2 tablets (10-20 mg) by mouth daily as needed for allergies    Itchy skin       CRANBERRY           DAILY MULTIVITAMIN PO      Take  by mouth.         FISH OIL           folic acid 1 MG tablet    FOLVITE    100 tablet    Take 1 tablet (1 mg) by mouth daily    Seronegative rheumatoid arthritis (H)       hydroxychloroquine 200 MG tablet    PLAQUENIL    180 tablet    Take 1 tablet (200 mg) by mouth 2 times daily    Seronegative rheumatoid arthritis (H)       hydrOXYzine 25 MG tablet    ATARAX    60 tablet    TAKE 1 TO 2 TABLETS BY MOUTH EVERY NIGHT AT BEDTIME AS NEEDED FOR ITCH    Itchy skin       ibuprofen 200 MG capsule      Take 200 mg by mouth every 4 hours as needed.        levalbuterol 45 MCG/ACT Inhaler    XOPENEX HFA    1 Inhaler    Inhale 2 puffs into the lungs every 4 hours as needed    Mild persistent asthma, uncomplicated       losartan 100 MG tablet    COZAAR    90 tablet    TAKE 1 TABLET (100 MG) BY MOUTH DAILY    Essential hypertension with goal blood pressure less than 140/90       metFORMIN 1000 MG tablet    GLUCOPHAGE    180 tablet    Take 1 tablet (1,000 mg) by mouth 2 times daily (with meals)    Type 2 diabetes mellitus without complication, without long-term current use of insulin (H)       methotrexate sodium 2.5 MG Tabs     96 tablet    Take 20 mg by mouth once a week . Take all 8 tablets on the same day of each week.    Seronegative rheumatoid arthritis (H)       metoprolol succinate 50 MG 24 hr tablet    TOPROL-XL    30 tablet    Take 1 tablet (50 mg) by mouth daily    Essential hypertension with goal blood pressure less than 140/90       mometasone 220 MCG/INH Inhaler    ASMANEX 60 METERED DOSES    3 Inhaler    Inhale 1 puff into the lungs 2 times daily    SOB (shortness of breath)       simvastatin 10 MG tablet    ZOCOR    90 tablet    TAKE 1 TABLET(10 MG) BY MOUTH AT BEDTIME    Hyperlipidemia LDL goal <100       tacrolimus 0.1 % ointment    PROTOPIC    60 g    Apply to the face daily, already failed desonide    AD (atopic dermatitis)       triamcinolone 55 MCG/ACT inhaler    NASACORT     Spray 2 sprays into both nostrils daily as needed     Seasonal allergic rhinitis       VITAMIN C PO      Take  by mouth.        VITAMIN D PO      Take  by mouth.

## 2018-10-16 ENCOUNTER — RADIANT APPOINTMENT (OUTPATIENT)
Dept: MAMMOGRAPHY | Facility: CLINIC | Age: 58
End: 2018-10-16
Attending: FAMILY MEDICINE
Payer: COMMERCIAL

## 2018-10-16 DIAGNOSIS — Z12.31 VISIT FOR SCREENING MAMMOGRAM: ICD-10-CM

## 2018-10-16 PROCEDURE — 77067 SCR MAMMO BI INCL CAD: CPT | Mod: TC

## 2018-10-26 DIAGNOSIS — M06.00 SERONEGATIVE RHEUMATOID ARTHRITIS (H): ICD-10-CM

## 2018-10-26 LAB
ALBUMIN SERPL-MCNC: 3.9 G/DL (ref 3.4–5)
ALP SERPL-CCNC: 63 U/L (ref 40–150)
ALT SERPL W P-5'-P-CCNC: 42 U/L (ref 0–50)
AST SERPL W P-5'-P-CCNC: 33 U/L (ref 0–45)
BASOPHILS # BLD AUTO: 0 10E9/L (ref 0–0.2)
BASOPHILS NFR BLD AUTO: 0.8 %
BILIRUB DIRECT SERPL-MCNC: 0.1 MG/DL (ref 0–0.2)
BILIRUB SERPL-MCNC: 0.5 MG/DL (ref 0.2–1.3)
CREAT SERPL-MCNC: 0.89 MG/DL (ref 0.52–1.04)
DIFFERENTIAL METHOD BLD: ABNORMAL
EOSINOPHIL # BLD AUTO: 0.5 10E9/L (ref 0–0.7)
EOSINOPHIL NFR BLD AUTO: 9.1 %
ERYTHROCYTE [DISTWIDTH] IN BLOOD BY AUTOMATED COUNT: 17.5 % (ref 10–15)
GFR SERPL CREATININE-BSD FRML MDRD: 65 ML/MIN/1.7M2
HCT VFR BLD AUTO: 36.6 % (ref 35–47)
HGB BLD-MCNC: 12 G/DL (ref 11.7–15.7)
LYMPHOCYTES # BLD AUTO: 1 10E9/L (ref 0.8–5.3)
LYMPHOCYTES NFR BLD AUTO: 18.4 %
MCH RBC QN AUTO: 29.7 PG (ref 26.5–33)
MCHC RBC AUTO-ENTMCNC: 32.8 G/DL (ref 31.5–36.5)
MCV RBC AUTO: 91 FL (ref 78–100)
MONOCYTES # BLD AUTO: 0.7 10E9/L (ref 0–1.3)
MONOCYTES NFR BLD AUTO: 13.4 %
NEUTROPHILS # BLD AUTO: 3 10E9/L (ref 1.6–8.3)
NEUTROPHILS NFR BLD AUTO: 58.3 %
PLATELET # BLD AUTO: 279 10E9/L (ref 150–450)
PROT SERPL-MCNC: 7.3 G/DL (ref 6.8–8.8)
RBC # BLD AUTO: 4.04 10E12/L (ref 3.8–5.2)
WBC # BLD AUTO: 5.2 10E9/L (ref 4–11)

## 2018-10-26 PROCEDURE — 85025 COMPLETE CBC W/AUTO DIFF WBC: CPT | Performed by: INTERNAL MEDICINE

## 2018-10-26 PROCEDURE — 80076 HEPATIC FUNCTION PANEL: CPT | Performed by: INTERNAL MEDICINE

## 2018-10-26 PROCEDURE — 82565 ASSAY OF CREATININE: CPT | Performed by: INTERNAL MEDICINE

## 2018-10-26 PROCEDURE — 36415 COLL VENOUS BLD VENIPUNCTURE: CPT | Performed by: INTERNAL MEDICINE

## 2019-01-07 DIAGNOSIS — E78.5 HYPERLIPIDEMIA LDL GOAL <100: ICD-10-CM

## 2019-01-08 RX ORDER — SIMVASTATIN 10 MG
TABLET ORAL
Qty: 30 TABLET | Refills: 0 | Status: SHIPPED | OUTPATIENT
Start: 2019-01-08 | End: 2019-02-22

## 2019-01-08 NOTE — TELEPHONE ENCOUNTER
"Routing refill request to provider for review/approval because:  Labs not current:  LDL    Requested Prescriptions   Pending Prescriptions Disp Refills     simvastatin (ZOCOR) 10 MG tablet [Pharmacy Med Name: SIMVASTATIN  TAB 10MG] 90 tablet 1     Sig: TAKE 1 TABLET AT BEDTIME    Statins Protocol Failed - 1/8/2019  6:52 AM       Failed - LDL on file in past 12 months    Recent Labs   Lab Test 12/19/17  0916   LDL 39            Passed - No abnormal creatine kinase in past 12 months    No lab results found.            Passed - Recent (12 mo) or future (30 days) visit within the authorizing provider's specialty    Patient had office visit in the last 12 months or has a visit in the next 30 days with authorizing provider or within the authorizing provider's specialty.  See \"Patient Info\" tab in inbasket, or \"Choose Columns\" in Meds & Orders section of the refill encounter.             Passed - Medication is active on med list       Passed - Patient is age 18 or older       Passed - No active pregnancy on record       Passed - No positive pregnancy test in past 12 months        Mary Bolivar RN  "

## 2019-01-08 NOTE — TELEPHONE ENCOUNTER
Signed Prescriptions:                        Disp   Refills    simvastatin (ZOCOR) 10 MG tablet           30 tab*0        Sig: TAKE 1 TABLET AT BEDTIME. No further refills until           follow-up appointment  Authorizing Provider: MIKE CARSON

## 2019-01-21 DIAGNOSIS — M06.00 SERONEGATIVE RHEUMATOID ARTHRITIS (H): ICD-10-CM

## 2019-01-21 LAB
ALBUMIN SERPL-MCNC: 3.7 G/DL (ref 3.4–5)
ALP SERPL-CCNC: 67 U/L (ref 40–150)
ALT SERPL W P-5'-P-CCNC: 39 U/L (ref 0–50)
AST SERPL W P-5'-P-CCNC: 34 U/L (ref 0–45)
BASOPHILS # BLD AUTO: 0 10E9/L (ref 0–0.2)
BASOPHILS NFR BLD AUTO: 0.5 %
BILIRUB DIRECT SERPL-MCNC: 0.2 MG/DL (ref 0–0.2)
BILIRUB SERPL-MCNC: 0.5 MG/DL (ref 0.2–1.3)
CREAT SERPL-MCNC: 0.96 MG/DL (ref 0.52–1.04)
CRP SERPL-MCNC: 4.9 MG/L (ref 0–8)
DIFFERENTIAL METHOD BLD: ABNORMAL
EOSINOPHIL # BLD AUTO: 0.5 10E9/L (ref 0–0.7)
EOSINOPHIL NFR BLD AUTO: 8.2 %
ERYTHROCYTE [DISTWIDTH] IN BLOOD BY AUTOMATED COUNT: 16.9 % (ref 10–15)
ERYTHROCYTE [SEDIMENTATION RATE] IN BLOOD BY WESTERGREN METHOD: 10 MM/H (ref 0–30)
GFR SERPL CREATININE-BSD FRML MDRD: 64 ML/MIN/{1.73_M2}
HCT VFR BLD AUTO: 37.8 % (ref 35–47)
HGB BLD-MCNC: 12 G/DL (ref 11.7–15.7)
LYMPHOCYTES # BLD AUTO: 1 10E9/L (ref 0.8–5.3)
LYMPHOCYTES NFR BLD AUTO: 17.4 %
MCH RBC QN AUTO: 28.9 PG (ref 26.5–33)
MCHC RBC AUTO-ENTMCNC: 31.7 G/DL (ref 31.5–36.5)
MCV RBC AUTO: 91 FL (ref 78–100)
MONOCYTES # BLD AUTO: 0.5 10E9/L (ref 0–1.3)
MONOCYTES NFR BLD AUTO: 8.2 %
NEUTROPHILS # BLD AUTO: 3.6 10E9/L (ref 1.6–8.3)
NEUTROPHILS NFR BLD AUTO: 65.7 %
PLATELET # BLD AUTO: 274 10E9/L (ref 150–450)
PROT SERPL-MCNC: 7.1 G/DL (ref 6.8–8.8)
RBC # BLD AUTO: 4.15 10E12/L (ref 3.8–5.2)
WBC # BLD AUTO: 5.5 10E9/L (ref 4–11)

## 2019-01-21 PROCEDURE — 36415 COLL VENOUS BLD VENIPUNCTURE: CPT | Performed by: INTERNAL MEDICINE

## 2019-01-21 PROCEDURE — 85025 COMPLETE CBC W/AUTO DIFF WBC: CPT | Performed by: INTERNAL MEDICINE

## 2019-01-21 PROCEDURE — 85652 RBC SED RATE AUTOMATED: CPT | Performed by: INTERNAL MEDICINE

## 2019-01-21 PROCEDURE — 80076 HEPATIC FUNCTION PANEL: CPT | Performed by: INTERNAL MEDICINE

## 2019-01-21 PROCEDURE — 82565 ASSAY OF CREATININE: CPT | Performed by: INTERNAL MEDICINE

## 2019-01-21 PROCEDURE — 86140 C-REACTIVE PROTEIN: CPT | Performed by: INTERNAL MEDICINE

## 2019-01-24 ENCOUNTER — OFFICE VISIT (OUTPATIENT)
Dept: RHEUMATOLOGY | Facility: CLINIC | Age: 59
End: 2019-01-24
Payer: COMMERCIAL

## 2019-01-24 VITALS
HEART RATE: 87 BPM | BODY MASS INDEX: 38.99 KG/M2 | HEIGHT: 66 IN | WEIGHT: 242.6 LBS | OXYGEN SATURATION: 96 % | DIASTOLIC BLOOD PRESSURE: 84 MMHG | SYSTOLIC BLOOD PRESSURE: 122 MMHG

## 2019-01-24 DIAGNOSIS — M06.00 SERONEGATIVE RHEUMATOID ARTHRITIS (H): Primary | ICD-10-CM

## 2019-01-24 DIAGNOSIS — M65.322 TRIGGER INDEX FINGER OF LEFT HAND: ICD-10-CM

## 2019-01-24 DIAGNOSIS — Z79.899 HIGH RISK MEDICATIONS (NOT ANTICOAGULANTS) LONG-TERM USE: ICD-10-CM

## 2019-01-24 PROCEDURE — 99214 OFFICE O/P EST MOD 30 MIN: CPT | Mod: 25 | Performed by: INTERNAL MEDICINE

## 2019-01-24 PROCEDURE — 20550 NJX 1 TENDON SHEATH/LIGAMENT: CPT | Performed by: INTERNAL MEDICINE

## 2019-01-24 RX ORDER — HYDROXYCHLOROQUINE SULFATE 200 MG/1
200 TABLET, FILM COATED ORAL 2 TIMES DAILY
Qty: 180 TABLET | Refills: 3 | Status: SHIPPED | OUTPATIENT
Start: 2019-01-24 | End: 2019-07-25

## 2019-01-24 RX ORDER — METHYLPREDNISOLONE ACETATE 40 MG/ML
10 INJECTION, SUSPENSION INTRA-ARTICULAR; INTRALESIONAL; INTRAMUSCULAR; SOFT TISSUE ONCE
Status: COMPLETED | OUTPATIENT
Start: 2019-01-24 | End: 2019-01-24

## 2019-01-24 RX ORDER — METHOTREXATE 2.5 MG/1
20 TABLET ORAL WEEKLY
Qty: 96 TABLET | Refills: 2 | Status: SHIPPED | OUTPATIENT
Start: 2019-01-24 | End: 2019-07-25

## 2019-01-24 RX ORDER — FOLIC ACID 1 MG/1
1 TABLET ORAL DAILY
Qty: 100 TABLET | Refills: 3 | Status: SHIPPED | OUTPATIENT
Start: 2019-01-24 | End: 2019-07-25

## 2019-01-24 RX ORDER — METHOTREXATE 2.5 MG/1
20 TABLET ORAL WEEKLY
Qty: 32 TABLET | Refills: 0 | Status: SHIPPED | OUTPATIENT
Start: 2019-01-24 | End: 2019-05-02

## 2019-01-24 RX ADMIN — METHYLPREDNISOLONE ACETATE 10 MG: 40 INJECTION, SUSPENSION INTRA-ARTICULAR; INTRALESIONAL; INTRAMUSCULAR; SOFT TISSUE at 11:20

## 2019-01-24 ASSESSMENT — MIFFLIN-ST. JEOR: SCORE: 1687.18

## 2019-01-24 NOTE — NURSING NOTE
RAPID3 (0-30) Cumulative Score  1.0          RAPID3 Weighted Score (divide #4 by 3 and that is the weighted score)  0.33     Patricia Rizzo [facility] Rheumatology  1/24/2019 11:20 AM

## 2019-01-24 NOTE — PROGRESS NOTES
Rheumatology Clinic Visit      Jorge Abarca MRN# [id number]   YOB: 1960 Age: 58 year old      Date of visit: 1/24/19   PCP: Dr. Coy Varela  Ophthalmology: [facility] in [place]     Chief Complaint   Patient presents with:  Arthritis: RA, left index finger keeps locking and somedays the middle finger will lock to.      Assessment and Plan   1. Seronegative nonerosive rheumatoid arthritis (RF negative, CCP negative): Symptoms began May 2015 and progressively worsened; initially with symmetric synovitis of the PIPs and MCPs and morning stiffness > 1 hour; steroid responsive. Currently on MTX 20mg PO weekly, folic acid 1mg daily, and HCQ 400mg daily.  Doing well today and will continue on the same regimen.   - Continue methotrexate 20mg once weekly  - Continue hydroxychloroquine 400mg daily (ophthalmology exam last on 1/16/2018 by Dr. Randy Ng at [facility] in [place])  - Continue folic acid 1mg daily  - Labs in 3 months: CBC, Creatinine, Hepatic Panel  - Labs in 6 months: CBC, Creatinine, Hepatic Panel, ESR, CRP     2. Left 3rd digit trigger finger: Steroid injections have been given on 11/2015, 9/2016, and 6/2017.  Not an issue today.     3. Left second digit trigger finger: Steroid injections were effective when given on 1/2018, and will repeat today.  For her trigger fingers, I have advised that if they keep recurring she should see a hand surgeon, and will give her a referral today.      4. History of DVT: reportedly associated with birth control; no recurrence reported    5. Bone Health: Following with her PCP for management    Ms. Abarca verbalized agreement with and understanding of the rational for the diagnosis and treatment plan.  All questions were answered to best of my ability and the patient's satisfaction. Ms. Abarca was advised to contact the clinic with any questions that may arise after the clinic visit.      Thank you for involving me in the care of the  patient    Return to clinic: 3 months    HPI   Jorge Abarca is a 58 year old female with a medical history significant for GERD, diabetes, hypertension, hyperlipidemia, lumbar degenerative disc disease s/p L4-L5 microdiskectomy in 2013, left first toe fracture s/p nonsurgical treatment, allergic rhinitis (receiving allergy shots), asthma, and history of DVT who presents for f/u of seronegative rheumatoid arthritis.     Today, Ms. Abarca reports that she is doing great.  However, she has a trigger finger of her left second digit and would like to have another steroid injection.  She recalls that her last steroid injection of this finger was about 1 year ago.  Tolerating methotrexate well.  Tolerating hydroxychloroquine well.  No joint pain.  Morning stiffness for less than 20 minutes.     Denies fevers, chills, nausea, vomiting, diarrhea. Chronic on and off constipation. No abdominal pain. No chest pain/pressure, palpitations, or shortness of breath. No oral or nasal sores. No neck pain. No LE swelling. No dry mouth. Dry eyes doing well with infrequent use of artificial tears.  No eye pain.    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   SKIN: See history of present illness  HEENT:  No epistaxis. No oral or nasal ulcers.  CV: No chest pain, pressure, palpitations, or dyspnea on exertion.  PULM: No SOB, wheeze, cough.  GI:  No nausea, vomiting, diarrhea. See history of present illness. No blood in stool. No abdominal pain.    : No blood in urine.  MSK: See HPI.  NEURO: No numbness, tingling, or weakness.  EXT: No LE swelling    Active Problem List     Patient Active Problem List   Diagnosis     Hypertension goal BP (blood pressure) < 140/90     Allergy to mold spores     House dust mite allergy     Allergic rhinitis due to animal dander     Hyperlipidemia LDL goal <100     Degenerative disc disease, lumbar     History of DVT  (deep vein thrombosis)     Mild persistent asthma without complication     Seronegative rheumatoid arthritis (H)     High risk medications (not anticoagulants) long-term use     Allergic rhinitis due to pollen     Type 2 diabetes mellitus without complication, without long-term current use of insulin (H)     Morbid obesity due to excess calories (H)     Advanced directives, counseling/discussion     Age-related osteoporosis with current pathological fracture     Past Medical History     Past Medical History:   Diagnosis Date     Allergic rhinitis due to animal dander      Allergy to mold spores     7/03 skin tests per [facility]: pos. for cat/dog/CR/DM/M/T/G/W     Closed compression fracture of first lumbar vertebra, initial encounter (H) 6/13/2018     Diabetes (H)      Diagnostic skin and sensitization tests 7/03 skin tests per [facility]: pos. for cat/dog/CR/DM/M/T/G/W     DVT (deep venous thrombosis) (H)      Eczema     sees [facility]     GERD (gastroesophageal reflux disease)      High cholesterol     occ.     House dust mite allergy      HTN (hypertension)      Indigestion     uses OTC Zantac prn     Mild persistent asthma      Morbid obesity due to excess calories (H)      Need for desensitization to allergens 9/03 started at [facility]. for: cat/dog/DM/M/T/G/W    start IT at [facility]: about 1/12      PONV (postoperative nausea and vomiting)      RA (rheumatoid arthritis) (H)      Seasonal allergic rhinitis      Past Surgical History     Past Surgical History:   Procedure Laterality Date     BACK SURGERY  2009     [facility] SURGICAL PATHOLOGY  1988    left foot     DISCECTOMY LUMBAR POSTERIOR MICROSCOPIC ONE LEVEL  3/18/2013    Procedure: DISCECTOMY LUMBAR POSTERIOR MICROSCOPIC ONE LEVEL;  Left Lumbar 4-5 Micro Discectomy;  Surgeon: Dallas Jensen MD;  Location: [facility]     TONSILLECTOMY & ADENOIDECTOMY  age 3     Allergy     Allergies   Allergen Reactions     Aleve [Naproxen Sodium] Hives     Hydralazine       Nexium [Esomeprazole Magnesium Trihydrate] Hives     HIVES     Shellfish Allergy Nausea     Sulfa Drugs      Stomach upset     Amlodipine Besylate Rash     Hctz Rash     Lisinopril Rash     Current Medication List     Current Outpatient Medications   Medication Sig     alendronate (FOSAMAX) 70 MG tablet Take 1 tablet (70 mg) by mouth with 8oz water every 7 days 30 minutes before breakfast and remain upright during this time.     Ascorbic Acid (VITAMIN C PO) Take  by mouth.     aspirin 81 MG tablet Take 1 tablet (81 mg) by mouth daily     blood glucose monitoring (NO BRAND SPECIFIED) test strip Use to test blood sugar 1 times daily or as directed.  One touch ultra test strips     Calcium Carbonate-Vit D-Min (CALCIUM 1200 PO) Take  by mouth.     cetirizine (ZYRTEC ALLERGY) 10 MG tablet Take 1-2 tablets (10-20 mg) by mouth daily as needed for allergies     Cholecalciferol (VITAMIN D PO) Take  by mouth.     CRANBERRY      FISH OIL      folic acid (FOLVITE) 1 MG tablet Take 1 tablet (1 mg) by mouth daily     hydroxychloroquine (PLAQUENIL) 200 MG tablet Take 1 tablet (200 mg) by mouth 2 times daily     hydrOXYzine (ATARAX) 25 MG tablet TAKE 1 TO 2 TABLETS BY MOUTH EVERY NIGHT AT BEDTIME AS NEEDED FOR ITCH     ibuprofen 200 MG capsule Take 200 mg by mouth every 4 hours as needed.     levalbuterol (XOPENEX HFA) 45 MCG/ACT inhaler Inhale 2 puffs into the lungs every 4 hours as needed     losartan (COZAAR) 100 MG tablet TAKE 1 TABLET (100 MG) BY MOUTH DAILY     metFORMIN (GLUCOPHAGE) 1000 MG tablet Take 1 tablet (1,000 mg) by mouth 2 times daily (with meals)     methotrexate sodium 2.5 MG TABS Take 20 mg by mouth once a week . Take all 8 tablets on the same day of each week.     metoprolol succinate (TOPROL-XL) 50 MG 24 hr tablet Take 1 tablet (50 mg) by mouth daily     mometasone (ASMANEX 60 METERED DOSES) 220 MCG/INH Inhaler Inhale 1 puff into the lungs 2 times daily     Multiple Vitamin (DAILY MULTIVITAMIN PO) Take  by  "mouth.     simvastatin (ZOCOR) 10 MG tablet TAKE 1 TABLET AT BEDTIME. No further refills until follow-up appointment     tacrolimus (PROTOPIC) 0.1 % ointment Apply to the face daily, already failed desonide     triamcinolone (NASACORT) 55 MCG/ACT nasal inhaler Spray 2 sprays into both nostrils daily as needed      No current facility-administered medications for this visit.      Social History   See HPI    Family History     Family History   Problem Relation Age of Onset     Cardiovascular Sister         atrial fibrillation     Cerebrovascular Disease Brother         carotid stenosis     Cancer Father         lung     Diabetes Mother      Breast Cancer Maternal Grandmother         also cousin on this side     Breast Cancer Paternal Aunt      C.A.D. No family hx of      Autoimmune Disease No family hx of      Physical Exam     Temp Readings from Last 3 Encounters:   08/07/18 97.9  F (36.6  C) (Oral)   06/13/18 98.4  F (36.9  C) (Oral)   01/03/18 97.7  F (36.5  C) (Oral)     BP Readings from Last 5 Encounters:   10/03/18 136/86   08/07/18 (!) 176/98   07/26/18 (!) 138/92   06/13/18 134/84   04/04/18 132/82     Pulse Readings from Last 1 Encounters:   10/03/18 88     Resp Readings from Last 1 Encounters:   08/07/18 12     Estimated body mass index is 38.9 kg/m  as calculated from the following:    Height as of 8/7/18: 1.676 m (5' 6\").    Weight as of 8/7/18: 109.3 kg (241 lb).    GEN: NAD  HEENT: MMM. No oral lesions. Anicteric, non-injected sclera.  CV: S1, S2. RRR. No m/r/g.  PULM: CTA bilaterally. No w/c.  MSK: Left 2nd digit trigger finger.  MCPs, PIPs, wrists, elbows, shoulders, knees, and ankles without swelling or tenderness to palpation.  Negative MCP and MTP squeeze.   Hips nontender to palpation.   NEURO: UE and LE strengths 5/5 and equal bilaterally.   SKIN: No rash  EXT: No LE edema  PSYCH: Alert. Appropriate.    Labs   RF/CCP  Recent Labs   Lab Test 11/10/15  0918 10/26/15  0850   CCPABY " <20  Interpretation:  Negative    --    RHF  --  <20     CBC  Recent Labs   Lab Test 01/21/19  1105 10/26/18  1056 07/23/18  1508   WBC 5.5 5.2 6.3   RBC 4.15 4.04 3.83   HGB 12.0 12.0 11.3*   HCT 37.8 36.6 35.0   MCV 91 91 91   RDW 16.9* 17.5* 16.6*    279 290   MCH 28.9 29.7 29.5   MCHC 31.7 32.8 32.3   NEUTROPHIL 65.7 58.3 60.5   LYMPH 17.4 18.4 22.9   MONOCYTE 8.2 13.4 8.4   EOSINOPHIL 8.2 9.1 7.9   BASOPHIL 0.5 0.8 0.3   ANEU 3.6 3.0 3.8   ALYM 1.0 1.0 1.4   KOKO 0.5 0.7 0.5   AEOS 0.5 0.5 0.5   ABAS 0.0 0.0 0.0     CMP  Recent Labs   Lab Test 01/21/19  1105 10/26/18  1056 08/07/18  1615 07/23/18  1508  06/28/17  1221  11/23/16  0917   NA  --   --  140  --   --  143  --  141   POTASSIUM  --   --  4.4  --   --  4.3  --  4.3   CHLORIDE  --   --  105  --   --  105  --  103   CO2  --   --  27  --   --  29  --  30   ANIONGAP  --   --  8  --   --  9  --  8   GLC  --   --  96  --   --  87  --  97   BUN  --   --  21  --   --  16  --  16   CR 0.96 0.89 0.91 0.91   < > 0.99   < > 0.81   GFRESTIMATED 64 65 63 63   < > 57*   < > 72   GFRESTBLACK 74 78 76 76   < > 69   < > 88   OG  --   --  9.7  --   --  9.9  --  9.5   BILITOTAL 0.5 0.5  --  0.3   < >  --    < >  --    ALBUMIN 3.7 3.9  --  4.0   < >  --    < >  --    PROTTOTAL 7.1 7.3  --  7.5   < >  --    < >  --    ALKPHOS 67 63  --  86   < >  --    < >  --    AST 34 33  --  33   < >  --    < >  --    ALT 39 42  --  36   < >  --    < >  --     < > = values in this interval not displayed.     Calcium/VitaminD  Recent Labs   Lab Test 08/07/18  1615 06/28/17  1221 11/23/16  0917 11/10/15  0918  02/12/13  0722 12/20/11  0829   OG 9.7 9.9 9.5  --    < > 9.7 10.1   D3VIT  --   --   --   --   --   --  26   VITDT  --   --   --  50  --  23*  --     < > = values in this interval not displayed.     ESR/CRP  Recent Labs   Lab Test 01/21/19  1105 07/23/18  1508 04/02/18  1333   SED 10 16 12   CRP 4.9 3.6 <2.9     Lipid Panel  Recent Labs   Lab Test 12/19/17  0916  11/23/16  0917 12/21/15  0844 12/19/14  0947 10/15/14  0704 02/12/13  0722   CHOL 127 153 169 201* 200* 207*   TRIG 73 73 85 66 120 128   HDL 73 70 71 60 56 46*   LDL 39 68 81 128 120 135*   VLDL  --   --   --  13 24 26   CHOLHDLRATIO  --   --   --  3.4 3.6 4.5   NHDL 54 83 98  --   --   --      Hepatitis B  Recent Labs   Lab Test 12/21/15  0844   AUSAB 0.12   HBCAB Nonreactive   HEPBANG Nonreactive     Hepatitis C  Recent Labs   Lab Test 12/21/15  0844   HCVAB Nonreactive   Assay performance characteristics have not been established for newborns,   infants, and children       HIV Screening  Recent Labs   Lab Test 12/21/15  0844   HIAGAB Nonreactive   HIV-1 p24 Ag & HIV-1/HIV-2 Ab Not Detected       Immunization History     Immunization History   Administered Date(s) Administered     HepB-Adult 08/07/2018     Pneumo Conj 13-V (2010&after) 06/01/2016     Pneumococcal 23 valent 09/07/2016     TD (ADULT, 7+) 12/06/1995, 12/06/2006     TDAP Vaccine (Adacel) 08/16/2017     Procedure     Procedure: Trigger finger injection  Indication: Trigger finger / pain    The procedure was explained in detail. Risks including infection, pain, structural damage such as cartilage damage and tendon rupture, and medication reaction were explained. The option of not doing the procedure was also provided. All questions were answered and the patient consented to the procedure.     A time-out was performed and the correct patient, procedure, and laterality were verified.    The left second digit was examined and location for injection was identified to be on the palmar aspect of the left hand just proximal to the second MCP at the location of the A1 pulley. The area was cleaned with chlorhexidine, twice.  Ethyl chloride was then used for topical anaesthetic. Then a mixture of lidocaine 1% 0.05mL and Depo-Medrol 10mg (0.25mL) was injected near the tendon sheath at the A1 pulley.     The patient tolerated the procedure well. No  complications.    MEDICATION: Methylprednisolone  LOT #: [id number]  :  [employer]  EXPIRATION DATE:  04/19  NDC#: 7387-3830-77     1% Lidocaine  : Hosp[employer]  Lot #: -[id number]  EXPIRATION DATE: 1Dec2019  NDC: 2565-5338-30           Chart documentation done in part with Dragon Voice recognition Software. Although reviewed after completion, some word and grammatical error may remain.    Chris Capellan MD

## 2019-01-24 NOTE — NURSING NOTE
The following medication was given:     MEDICATION: Methylprednisolone  SITE: A1 pulley of the 2nd digit of the left hand  DOSE: 1 ml  LOT #: [id number]  :  [employer]  EXPIRATION DATE:  04/19  NDC#: 7027-7834-87    1% Lidocaine  : Hosp[employer]  Lot #: -[id number]  EXPIRATION DATE: 1Dec2019  NDC: 5147-8690-24

## 2019-02-01 ENCOUNTER — TRANSFERRED RECORDS (OUTPATIENT)
Dept: MULTI SPECIALTY CLINIC | Facility: CLINIC | Age: 59
End: 2019-02-01

## 2019-02-12 ENCOUNTER — MYC MEDICAL ADVICE (OUTPATIENT)
Dept: FAMILY MEDICINE | Facility: CLINIC | Age: 59
End: 2019-02-12

## 2019-02-12 DIAGNOSIS — E66.01 MORBID OBESITY DUE TO EXCESS CALORIES (H): ICD-10-CM

## 2019-02-12 DIAGNOSIS — E11.9 TYPE 2 DIABETES MELLITUS WITHOUT COMPLICATION, WITHOUT LONG-TERM CURRENT USE OF INSULIN (H): ICD-10-CM

## 2019-02-12 DIAGNOSIS — E78.5 HYPERLIPIDEMIA LDL GOAL <100: Primary | ICD-10-CM

## 2019-02-12 NOTE — TELEPHONE ENCOUNTER
See mychart message.  Last lipid panel done on 12/19/17, last OV was on 8/7/18.    Sita Wilkerson RN

## 2019-02-15 DIAGNOSIS — E78.5 HYPERLIPIDEMIA LDL GOAL <100: ICD-10-CM

## 2019-02-15 DIAGNOSIS — E11.9 TYPE 2 DIABETES MELLITUS WITHOUT COMPLICATION, WITHOUT LONG-TERM CURRENT USE OF INSULIN (H): ICD-10-CM

## 2019-02-15 DIAGNOSIS — E66.01 MORBID OBESITY DUE TO EXCESS CALORIES (H): ICD-10-CM

## 2019-02-15 LAB
CHOLEST SERPL-MCNC: 151 MG/DL
HBA1C MFR BLD: 6.7 % (ref 0–5.6)
HDLC SERPL-MCNC: 67 MG/DL
LDLC SERPL CALC-MCNC: 68 MG/DL
NONHDLC SERPL-MCNC: 84 MG/DL
TRIGL SERPL-MCNC: 80 MG/DL

## 2019-02-15 PROCEDURE — 83036 HEMOGLOBIN GLYCOSYLATED A1C: CPT | Performed by: FAMILY MEDICINE

## 2019-02-15 PROCEDURE — 36415 COLL VENOUS BLD VENIPUNCTURE: CPT | Performed by: FAMILY MEDICINE

## 2019-02-15 PROCEDURE — 80061 LIPID PANEL: CPT | Performed by: FAMILY MEDICINE

## 2019-02-15 NOTE — RESULT ENCOUNTER NOTE
Dear Jorge,    Your recent test results are attached.      Good 3 month blood sugar average.    If you have any questions please feel free to contact [phone] or myself via [employer].    Sincerely,  Velia East, CNP

## 2019-02-22 ENCOUNTER — MYC REFILL (OUTPATIENT)
Dept: FAMILY MEDICINE | Facility: CLINIC | Age: 59
End: 2019-02-22

## 2019-02-22 DIAGNOSIS — E78.5 HYPERLIPIDEMIA LDL GOAL <100: ICD-10-CM

## 2019-02-25 RX ORDER — SIMVASTATIN 10 MG
TABLET ORAL
Qty: 90 TABLET | Refills: 1 | Status: SHIPPED | OUTPATIENT
Start: 2019-02-25 | End: 2019-08-03

## 2019-02-25 NOTE — TELEPHONE ENCOUNTER
"Routing refill request to provider for review/approval because:  Pallavi given x1 and patient did not follow up, please advise    Requested Prescriptions   Pending Prescriptions Disp Refills     simvastatin (ZOCOR) 10 MG tablet 90 tablet 0     Sig: TAKE 1 TABLET AT BEDTIME. No further refills until follow-up appointment    Statins Protocol Passed - 2/22/2019  2:40 PM       Passed - LDL on file in past 12 months    Recent Labs   Lab Test 02/15/19  1001   LDL 68            Passed - No abnormal creatine kinase in past 12 months    No lab results found.            Passed - Recent (12 mo) or future (30 days) visit within the authorizing provider's specialty    Patient had office visit in the last 12 months or has a visit in the next 30 days with authorizing provider or within the authorizing provider's specialty.  See \"Patient Info\" tab in inbasket, or \"Choose Columns\" in Meds & Orders section of the refill encounter.             Passed - Medication is active on med list       Passed - Patient is age 18 or older       Passed - No active pregnancy on record       Passed - No positive pregnancy test in past 12 months        Mary Bolivar RN  "

## 2019-03-18 DIAGNOSIS — R06.02 SOB (SHORTNESS OF BREATH): ICD-10-CM

## 2019-03-18 NOTE — TELEPHONE ENCOUNTER
Pending Prescriptions:                       Disp   Refills    mometasone (ASMANEX 60 METERED DOSES) 220*3 Inha*0            Sig: Inhale 1 puff into the lungs 2 times daily    Routing refill request to provider for review/approval because:  Patient needs to be seen because it has been more than 1 year since last office visit (9/1/17).      Anastasiia Vu RN

## 2019-03-18 NOTE — TELEPHONE ENCOUNTER
Requested Prescriptions   Pending Prescriptions Disp Refills     mometasone (ASMANEX 60 METERED DOSES) 220 MCG/INH inhaler 3 Inhaler 0     Sig: Inhale 1 puff into the lungs 2 times daily    There is no refill protocol information for this order

## 2019-03-19 ENCOUNTER — TRANSFERRED RECORDS (OUTPATIENT)
Dept: HEALTH INFORMATION MANAGEMENT | Facility: CLINIC | Age: 59
End: 2019-03-19

## 2019-03-19 NOTE — TELEPHONE ENCOUNTER
Left message that patient is over due for clinic follow up. Scheduling number left in voice mail.      Sunshine Clay CMA

## 2019-03-29 ENCOUNTER — OFFICE VISIT (OUTPATIENT)
Dept: ALLERGY | Facility: CLINIC | Age: 59
End: 2019-03-29
Payer: COMMERCIAL

## 2019-03-29 ENCOUNTER — ANCILLARY PROCEDURE (OUTPATIENT)
Dept: GENERAL RADIOLOGY | Facility: CLINIC | Age: 59
End: 2019-03-29
Attending: ALLERGY & IMMUNOLOGY
Payer: COMMERCIAL

## 2019-03-29 VITALS
DIASTOLIC BLOOD PRESSURE: 80 MMHG | RESPIRATION RATE: 20 BRPM | OXYGEN SATURATION: 96 % | HEIGHT: 66 IN | WEIGHT: 243 LBS | SYSTOLIC BLOOD PRESSURE: 130 MMHG | TEMPERATURE: 96.9 F | BODY MASS INDEX: 39.05 KG/M2 | HEART RATE: 89 BPM

## 2019-03-29 DIAGNOSIS — J45.30 MILD PERSISTENT ASTHMA WITHOUT COMPLICATION: ICD-10-CM

## 2019-03-29 DIAGNOSIS — J30.1 SEASONAL ALLERGIC RHINITIS DUE TO POLLEN: ICD-10-CM

## 2019-03-29 DIAGNOSIS — M06.00 SERONEGATIVE RHEUMATOID ARTHRITIS (H): ICD-10-CM

## 2019-03-29 DIAGNOSIS — J30.81 ALLERGIC RHINITIS DUE TO ANIMAL DANDER: ICD-10-CM

## 2019-03-29 DIAGNOSIS — J45.30 MILD PERSISTENT ASTHMA WITHOUT COMPLICATION: Primary | ICD-10-CM

## 2019-03-29 LAB
FEF 25/75: NORMAL
FEV-1: NORMAL
FEV1/FVC: NORMAL
FVC: NORMAL

## 2019-03-29 PROCEDURE — 99214 OFFICE O/P EST MOD 30 MIN: CPT | Mod: 25 | Performed by: ALLERGY & IMMUNOLOGY

## 2019-03-29 PROCEDURE — 94010 BREATHING CAPACITY TEST: CPT | Performed by: ALLERGY & IMMUNOLOGY

## 2019-03-29 PROCEDURE — 71046 X-RAY EXAM CHEST 2 VIEWS: CPT

## 2019-03-29 RX ORDER — LEVALBUTEROL TARTRATE 45 UG/1
2 AEROSOL, METERED ORAL EVERY 4 HOURS PRN
Qty: 15 G | Refills: 3 | Status: SHIPPED | OUTPATIENT
Start: 2019-03-29

## 2019-03-29 RX ORDER — LIFITEGRAST 50 MG/ML
SOLUTION/ DROPS OPHTHALMIC
COMMUNITY
Start: 2019-02-27

## 2019-03-29 ASSESSMENT — MIFFLIN-ST. JEOR: SCORE: 1688.99

## 2019-03-29 NOTE — ASSESSMENT & PLAN NOTE
Asthma is currently well controlled. Shortness of breath every other week. She does not have albuterol. She remains on Asmanex 220  g 1 puff inhaled daily.  Asthma triggers remain perfumes, dogs, dust and smoke. History of rheumatoid arthritis. Follows with rheumatology Dr. Capellan.      Spirometry today with decreased FEV1 and FVC. Worse than when last obtained.   ACT 25     - An asthma action plan was provided and discussed with patient and family.   - Xopenex 2-4 puffs inhaled (use a spacer unless using a Proair Respiclick device) every 4 hours as needed for chest tightness, wheezing, shortness of breath and/or coughing.   - Avoid asthma triggers.  - Continue on Asmanex 220mcg 1 puff daily.   - Complete pulmonary function studies and chest x-ray. Given restriction and history of rheumatoid arthritis.

## 2019-03-29 NOTE — PROGRESS NOTES
Jorge Abarca is a 58 year old White female with previous medical history significant for rheumatoid arthritis, allergic rhinitis and mild persistent asthma who returns for a follow up visit.     Patient returns for asthma follow-up.  She has mild persistent asthma.  She has been on Asmanex 220 mcg 1 puff inhaled daily.  When she has tried off of inhaled corticosteroids since last visit she had increased chest symptoms.  She reports that she will have intermittent shortness of breath once every couple of weeks.  If she develops the symptoms she will take an extra dose of her Flovent.  She does not have a current albuterol inhaler.  She denies wheezing or tightness in chest.  With upper respiratory tract infection she will develop coughing.  Follows with rheumatology.  History of rheumatoid arthritis on Plaquenil and methotrexate.  History of allergic rhinoconjunctivitis.  She will have spring and fall worsening nasal and ocular symptoms.  However, she could have symptoms year-round.  Symptoms include rhinorrhea, congestion and sneezing.  She is using Nasacort and Zyrtec as needed. Historically restriction on spirometry.     ACT Total Scores 3/29/2019   ACT TOTAL SCORE -   ASTHMA ER VISITS -   ASTHMA HOSPITALIZATIONS -   ACT TOTAL SCORE (Goal Greater than or Equal to 20) 25   In the past 12 months, how many times did you visit the emergency room for your asthma without being admitted to the hospital? 0   In the past 12 months, how many times were you hospitalized overnight because of your asthma? 0           Past Medical History:   Diagnosis Date     Allergic rhinitis due to animal dander      Allergy to mold spores     7/03 skin tests per [facility]: pos. for cat/dog/CR/DM/M/T/G/W     Closed compression fracture of first lumbar vertebra, initial encounter (H) 6/13/2018     Diabetes (H)      Diagnostic skin and sensitization tests 7/03 skin tests per [facility]: pos. for cat/dog/CR/DM/M/T/G/W     DVT (deep venous  thrombosis) (H)      Eczema     sees [facility]     GERD (gastroesophageal reflux disease)      High cholesterol     occ.     House dust mite allergy      HTN (hypertension)      Indigestion     uses OTC Zantac prn     Mild persistent asthma      Morbid obesity due to excess calories (H)      Need for desensitization to allergens 9/03 started at [facility]. for: cat/dog/DM/M/T/G/W    start IT at [facility]: about 1/12      PONV (postoperative nausea and vomiting)      RA (rheumatoid arthritis) (H)      Seasonal allergic rhinitis      Family History   Problem Relation Age of Onset     Cardiovascular Sister         atrial fibrillation     Cerebrovascular Disease Brother         carotid stenosis     Cancer Father         lung     Diabetes Mother      Breast Cancer Maternal Grandmother         also cousin on this side     Breast Cancer Paternal Aunt      C.A.D. No family hx of      Autoimmune Disease No family hx of      Past Surgical History:   Procedure Laterality Date     BACK SURGERY  2009     [facility] SURGICAL PATHOLOGY  1988    left foot     DISCECTOMY LUMBAR POSTERIOR MICROSCOPIC ONE LEVEL  3/18/2013    Procedure: DISCECTOMY LUMBAR POSTERIOR MICROSCOPIC ONE LEVEL;  Left Lumbar 4-5 Micro Discectomy;  Surgeon: Dallas Jensen MD;  Location: [facility]     TONSILLECTOMY & ADENOIDECTOMY  age 3       REVIEW OF SYSTEMS:  General: negative for weight gain. negative for weight loss. negative for changes in sleep.   Ears: negative for fullness. negative for hearing loss. negative for dizziness.   Nose: negative for snoring.negative for changes in smell. negative for drainage.   Eyes: positive  for eye watering. negative for eye itching. negative for vision changes. negative for eye redness.  Throat: negative for hoarseness. negative for sore throat. negative for trouble swallowing.   Lungs: negative for shortness of breath.negative for wheezing. negative for sputum production.   Cardiovascular: negative for chest pain.  negative for swelling of ankles. negative for fast or irregular heartbeat.   Gastrointestinal: negative for nausea. negative for heartburn. Positive for acid reflux.   Musculoskeletal: negative for joint pain. negative for joint stiffness. negative for joint swelling.   Neurologic: negative for seizures. negative for fainting. negative for weakness.   Psychiatric: negative for changes in mood. negative for anxiety.   Endocrine: negative for cold intolerance. negative for heat intolerance. negative for tremors.   Lymphatic: negative for lower extremity swelling. negative for lymph node swelling.   Hematologic: negative for easy bruising. negative for easy bleeding.  Integumentary: negative for rash. negative for scaling. negative for nail changes.       Current Outpatient Medications:      alendronate (FOSAMAX) 70 MG tablet, Take 1 tablet (70 mg) by mouth with 8oz water every 7 days 30 minutes before breakfast and remain upright during this time., Disp: 12 tablet, Rfl: 3     Ascorbic Acid (VITAMIN C PO), Take  by mouth., Disp: , Rfl:      aspirin 81 MG tablet, Take 1 tablet (81 mg) by mouth daily, Disp: 30 tablet, Rfl:      blood glucose monitoring (NO BRAND SPECIFIED) test strip, Use to test blood sugar 1 times daily or as directed. One touch ultra test strips, Disp: 3 Month, Rfl: 3     Calcium Carbonate-Vit D-Min (CALCIUM 1200 PO), Take  by mouth., Disp: , Rfl:      cetirizine (ZYRTEC ALLERGY) 10 MG tablet, Take 1-2 tablets (10-20 mg) by mouth daily as needed for allergies, Disp: 60 tablet, Rfl: 11     Cholecalciferol (VITAMIN D PO), Take  by mouth., Disp: , Rfl:      CRANBERRY, , Disp: , Rfl:      FISH OIL, , Disp: , Rfl:      folic acid (FOLVITE) 1 MG tablet, Take 1 tablet (1 mg) by mouth daily, Disp: 100 tablet, Rfl: 3     hydroxychloroquine (PLAQUENIL) 200 MG tablet, Take 1 tablet (200 mg) by mouth 2 times daily, Disp: 180 tablet, Rfl: 3     hydrOXYzine (ATARAX) 25 MG tablet, TAKE 1 TO 2 TABLETS BY MOUTH EVERY  NIGHT AT BEDTIME AS NEEDED FOR ITCH, Disp: 60 tablet, Rfl: 8     ibuprofen 200 MG capsule, Take 200 mg by mouth every 4 hours as needed., Disp: , Rfl:      levalbuterol (XOPENEX HFA) 45 MCG/ACT inhaler, Inhale 2 puffs into the lungs every 4 hours as needed for shortness of breath / dyspnea or wheezing, Disp: 15 g, Rfl: 3     losartan (COZAAR) 100 MG tablet, TAKE 1 TABLET (100 MG) BY MOUTH DAILY, Disp: 90 tablet, Rfl: 3     metFORMIN (GLUCOPHAGE) 1000 MG tablet, Take 1 tablet (1,000 mg) by mouth 2 times daily (with meals), Disp: 180 tablet, Rfl: 3     methotrexate sodium 2.5 MG TABS, Take 8 tablets (20 mg) by mouth once a week . Take all 8 tablets on the same day of each week., Disp: 96 tablet, Rfl: 2     metoprolol succinate (TOPROL-XL) 50 MG 24 hr tablet, Take 1 tablet (50 mg) by mouth daily, Disp: 90 tablet, Rfl: 3     mometasone (ASMANEX 60 METERED DOSES) 220 MCG/INH inhaler, Inhale 1 puff into the lungs daily, Disp: 3 Inhaler, Rfl: 3     Multiple Vitamin (DAILY MULTIVITAMIN PO), Take  by mouth., Disp: , Rfl:      simvastatin (ZOCOR) 10 MG tablet, TAKE 1 TABLET AT BEDTIME, Disp: 90 tablet, Rfl: 1     tacrolimus (PROTOPIC) 0.1 % ointment, Apply to the face daily, already failed desonide, Disp: 60 g, Rfl: 0     triamcinolone (NASACORT) 55 MCG/ACT nasal inhaler, Spray 2 sprays into both nostrils daily as needed , Disp: , Rfl:      levalbuterol (XOPENEX HFA) 45 MCG/ACT inhaler, Inhale 2 puffs into the lungs every 4 hours as needed (Patient not taking: Reported on 3/29/2019), Disp: 1 Inhaler, Rfl: 3     methotrexate sodium 2.5 MG TABS, Take 8 tablets (20 mg) by mouth once a week . Take all 8 tablets on the same day of each week., Disp: 32 tablet, Rfl: 0     XIIDRA 5 % opthalmic solution, , Disp: , Rfl:   Immunization History   Administered Date(s) Administered     HepB-Adult 08/07/2018     Pneumo Conj 13-V (2010&after) 06/01/2016     Pneumococcal 23 valent 09/07/2016     TD (ADULT, 7+) 12/06/1995, 12/06/2006     TDAP  Vaccine (Adacel) 08/16/2017     Allergies   Allergen Reactions     Aleve [Naproxen Sodium] Hives     Hydralazine      Nexium [Esomeprazole Magnesium Trihydrate] Hives     HIVES     Shellfish Allergy Nausea     Sulfa Drugs      Stomach upset     Amlodipine Besylate Rash     Hctz Rash     Lisinopril Rash         EXAM:   Constitutional:  Appears well-developed and well-nourished. No distress.   HEENT:   Head: Normocephalic.   Mouth/Throat: No oropharyngeal exudate present.   No cobblestoning of posterior oropharynx.   Nasal tissue pink and normal appearing.  No rhinorrhea noted.    Eyes: Conjunctivae are non-erythematous   No maxillary or frontal sinus tenderness to palpation.   Cardiovascular: Normal rate, regular rhythm and normal heart sounds. Exam reveals no gallop and no friction rub.   No murmur heard.  Respiratory: Effort normal and breath sounds normal. No respiratory distress. No wheezes. No rales.   Musculoskeletal: Normal range of motion.   Neuro: Oriented to person, place, and time.  Skin: Skin is warm and dry. No rash noted.   Psychiatric: Normal mood and affect.     Nursing note and vitals reviewed.      WORKUP:   Spirometry  FVC % pred:55  FEV1 % pred:58  FEV1/FVC % act:82  FEF 25-75% pred:77    Restrictive spirometry.       ASSESSMENT/PLAN:  Problem List Items Addressed This Visit        Respiratory    Allergic rhinitis due to animal dander    Relevant Medications    mometasone (ASMANEX 60 METERED DOSES) 220 MCG/INH inhaler    levalbuterol (XOPENEX HFA) 45 MCG/ACT inhaler    Mild persistent asthma without complication - Primary     Asthma is currently well controlled. Shortness of breath every other week. She does not have albuterol. She remains on Asmanex 220  g 1 puff inhaled daily.  Asthma triggers remain perfumes, dogs, dust and smoke. History of rheumatoid arthritis. Follows with rheumatology Dr. Capellan.      Spirometry today with decreased FEV1 and FVC. Worse than when last obtained.   ACT  25     - An asthma action plan was provided and discussed with patient and family.   - Xopenex 2-4 puffs inhaled (use a spacer unless using a Proair Respiclick device) every 4 hours as needed for chest tightness, wheezing, shortness of breath and/or coughing.   - Avoid asthma triggers.  - Continue on Asmanex 220mcg 1 puff daily.   - Complete pulmonary function studies and chest x-ray. Given restriction and history of rheumatoid arthritis.            Relevant Medications    mometasone (ASMANEX 60 METERED DOSES) 220 MCG/INH inhaler    levalbuterol (XOPENEX HFA) 45 MCG/ACT inhaler    Other Relevant Orders    Spirometry, Breathing Capacity (Completed)    Pulmonary Function Test    CHEST X-RAY 2 VW [06032]    Allergic rhinitis due to pollen     Was on allergen immunotherapy. Stopped  in 2016. No worsening since stopping immunotherapy. Still has some mild symptoms year round but worse in the spring and fall. Current treatment includes cetirizine 10 mg by mouth daily as needed and nasacort as needed.      - Nasacort  2 sprays/nostril daily if symptoms are not controlled with oral antihistamine.   - Zyrtec daily as needed for allergy symptoms.          Relevant Medications    mometasone (ASMANEX 60 METERED DOSES) 220 MCG/INH inhaler    levalbuterol (XOPENEX HFA) 45 MCG/ACT inhaler       Musculoskeletal and Integumentary    Seronegative rheumatoid arthritis (H)          Chart documentation with Dragon Voice recognition Software. Although reviewed after completion, some words and grammatical errors may remain.    Mike Fox DO   Allergy/Immunology  [facility], [place] and [place], [place]

## 2019-03-29 NOTE — LETTER
My Asthma Action Plan  Name: Jorge Abarca   YOB: 1960  Date: 3/29/2019   My doctor: Mike Fox, DO   My clinic: [facility]        My Control Medicine: Mometasone (Asmanex) -  Twisthaler 220 mcg 1 puff daily  My Rescue Medicine:   Albuterol 2-4 puffs inhaled (use a spacer unless using a Proair Respiclick device) every 4 hours as needed for chest tightness, wheezing, shortness of breath and/or coughing.      My Asthma Severity: mild persistent  Avoid your asthma triggers:   None            GREEN ZONE   Good Control    I feel good    No cough or wheeze    Can work, sleep and play without asthma symptoms       Take your asthma control medicine every day.     1. If exercise triggers your asthma, take your rescue medication    15 minutes before exercise or sports, and    During exercise if you have asthma symptoms  2. Spacer to use with inhaler: If you have a spacer, make sure to use it with your inhaler             YELLOW ZONE Getting Worse  I have ANY of these:    I do not feel good    Cough or wheeze    Chest feels tight    Wake up at night   1. Keep taking your Green Zone medications  2. Start taking your rescue medicine:    every 20 minutes for up to 1 hour. Then every 4 hours for 24-48 hours.  3. If you stay in the Yellow Zone for more than 12-24 hours, contact your doctor.  4. If you do not return to the Green Zone in 12-24 hours or you get worse, start taking your oral steroid medicine if prescribed by your provider.           RED ZONE Medical Alert - Get Help  I have ANY of these:    I feel awful    Medicine is not helping    Breathing getting harder    Trouble walking or talking    Nose opens wide to breathe       1. Take your rescue medicine NOW  2. If your provider has prescribed an oral steroid medicine, start taking it NOW  3. Call your doctor NOW  4. If you are still in the Red Zone after 20 minutes and you have not reached your doctor:    Take your rescue medicine  again and    Call 911 or go to the emergency room right away    See your regular doctor within 2 weeks of an Emergency Room or Urgent Care visit for follow-up treatment.          Annual Reminders:  Meet with Asthma Educator,  Flu Shot in the Fall, consider Pneumonia Vaccination for patients with asthma (aged 19 and older).    Pharmacy:    [id number]  [place] - [address] [facility] [address] [facility] [address]  [facility] [address] AT PORTAL TO [facility] SITES                      Asthma Triggers  How To Control Things That Make Your Asthma Worse    Triggers are things that make your asthma worse.  Look at the list below to help you find your triggers and what you can do about them.  You can help prevent asthma flare-ups by staying away from your triggers.      Trigger                                                          What you can do   Cigarette Smoke  Tobacco smoke can make asthma worse. Do not allow smoking in your home, car or around you.  Be sure no one smokes at a child s day care or school.  If you smoke, ask your health care provider for ways to help you quit.  Ask family members to quit too.  Ask your health care provider for a referral to Quit Plan to help you quit smoking, or call 5-023-608-PLAN.     Colds, Flu, Bronchitis  These are common triggers of asthma. Wash your hands often.  Don t touch your eyes, nose or mouth.  Get a flu shot every year.     Dust Mites  These are tiny bugs that live in cloth or carpet. They are too small to see. Wash sheets and blankets in hot water every week.   Encase pillows and mattress in dust mite proof covers.  Avoid having carpet if you can. If you have carpet, vacuum weekly.   Use a dust mask and HEPA vacuum.   Pollen and Outdoor Mold  Some people are allergic to trees, grass, or weed pollen, or molds. Try to keep your windows closed.  Limit time out doors when pollen count is high.    Ask you health care provider about taking medicine during allergy season.     Animal Dander  Some people are allergic to skin flakes, urine or saliva from pets with fur or feathers. Keep pets with fur or feathers out of your home.    If you can t keep the pet outdoors, then keep the pet out of your bedroom.  Keep the bedroom door closed.  Keep pets off cloth furniture and away from stuffed toys.     Mice, Rats, and Cockroaches  Some people are allergic to the waste from these pests.   Cover food and garbage.  Clean up spills and food crumbs.  Store grease in the refrigerator.   Keep food out of the bedroom.   Indoor Mold  This can be a trigger if your home has high moisture. Fix leaking faucets, pipes, or other sources of water.   Clean moldy surfaces.  Dehumidify basement if it is damp and smelly.   Smoke, Strong Odors, and Sprays  These can reduce air quality. Stay away from strong odors and sprays, such as perfume, powder, hair spray, paints, smoke incense, paint, cleaning products, candles and new carpet.   Exercise or Sports  Some people with asthma have this trigger. Be active!  Ask your doctor about taking medicine before sports or exercise to prevent symptoms.    Warm up for 5-10 minutes before and after sports or exercise.     Other Triggers of Asthma  Cold air:  Cover your nose and mouth with a scarf.  Sometimes laughing or crying can be a trigger.  Some medicines and food can trigger asthma.

## 2019-03-29 NOTE — PATIENT INSTRUCTIONS
Allergy Staff Appt Hours Shot Hours Locations    Physician     Mike Fox DO       Support Staff     ELBA Bourne CMA  Tuesday:        [place] 7-4:20     Wednesday:        [place]: 7-5     Thursday:                    [place] 7-6:40     Friday:        [place] 7-2:40   [place]        Thursday: 1-5:50        Friday: 7-10:50     [place]        Tuesday: 7- 3:20        Wednesday: 7-4:20     [place] Monday: 7-4:20        Tuesday: 1-6:20         [facility]  [address] [place] [address]  Appt Line: [phone]  Allergy RN (Thurs & Fri):  [phone]    [facility]  [address] [place] [address]  Appt Line: [phone]  Allergy RN (Tues & Wed):  [phone]       Important Scheduling Information  Aspirin Desensitization: Appt will last 2 clinic days. Please call the Allergy RN line for your clinic to schedule. Discontinue antihistamines 7 days prior to the appointment.     Food Challenges: Appt will last 3-4 hours. Please call the Allergy RN line for your clinic to schedule. Discontinue antihistamines 7 days prior to the appointment.     Penicillin Testing: Appt will last 2-3 hours. Please call the Allergy RN line for your clinic to schedule. Discontinue antihistamines 7 days prior to the appointment.     Skin Testing: Appt will about 40 minutes. Call the appointment line for your clinic to schedule. Discontinue antihistamines 7 days prior to the appointment.     Venom Testing: Appt will last 2-3 hours. Please call the Allergy RN line for your clinic to schedule. Discontinue antihistamines 7 days prior to the appointment.     Thank you for trusting us with your Allergy, Asthma, and Immunology care. Please feel free to contact us with any questions or concerns you may have.      - Nasacort and/or Zyrtec as needed.   - Asmanex 220mcg 1 puff daily.   - Albuterol 2-4 puffs inhaled (use a spacer unless using a Proair Respiclick device) every 4 hours as needed for  chest tightness, wheezing, shortness of breath and/or coughing.   - Complete pulmonary function studies.   - Chest x-ray

## 2019-03-29 NOTE — ASSESSMENT & PLAN NOTE
Was on allergen immunotherapy. Stopped in 2016. No worsening since stopping immunotherapy. Still has some mild symptoms year round but worse in the spring and fall. Current treatment includes cetirizine 10 mg by mouth daily as needed and nasacort as needed.      - Nasacort  2 sprays/nostril daily if symptoms are not controlled with oral antihistamine.   - Zyrtec daily as needed for allergy symptoms.

## 2019-03-29 NOTE — LETTER
3/29/2019         RE: Jorge Abarca  [address]  [place] [address]        Dear Colleague,    Thank you for referring your patient, Jorge Abarca, to the [facility]. Please see a copy of my visit note below.    Jorge Abarca is a 58 year old White female with previous medical history significant for rheumatoid arthritis, allergic rhinitis and mild persistent asthma who returns for a follow up visit.     Patient returns for asthma follow-up.  She has mild persistent asthma.  She has been on Asmanex 220 mcg 1 puff inhaled daily.  When she has tried off of inhaled corticosteroids since last visit she had increased chest symptoms.  She reports that she will have intermittent shortness of breath once every couple of weeks.  If she develops the symptoms she will take an extra dose of her Flovent.  She does not have a current albuterol inhaler.  She denies wheezing or tightness in chest.  With upper respiratory tract infection she will develop coughing.  Follows with rheumatology.  History of rheumatoid arthritis on Plaquenil and methotrexate.  History of allergic rhinoconjunctivitis.  She will have spring and fall worsening nasal and ocular symptoms.  However, she could have symptoms year-round.  Symptoms include rhinorrhea, congestion and sneezing.  She is using Nasacort and Zyrtec as needed. Historically restriction on spirometry.     ACT Total Scores 3/29/2019   ACT TOTAL SCORE -   ASTHMA ER VISITS -   ASTHMA HOSPITALIZATIONS -   ACT TOTAL SCORE (Goal Greater than or Equal to 20) 25   In the past 12 months, how many times did you visit the emergency room for your asthma without being admitted to the hospital? 0   In the past 12 months, how many times were you hospitalized overnight because of your asthma? 0           Past Medical History:   Diagnosis Date     Allergic rhinitis due to animal dander      Allergy to mold spores     7/03 skin tests per [facility]: pos. for  cat/dog/CR/DM/M/T/G/W     Closed compression fracture of first lumbar vertebra, initial encounter (H) 6/13/2018     Diabetes (H)      Diagnostic skin and sensitization tests 7/03 skin tests per [facility]: pos. for cat/dog/CR/DM/M/T/G/W     DVT (deep venous thrombosis) (H)      Eczema     sees [facility]     GERD (gastroesophageal reflux disease)      High cholesterol     occ.     House dust mite allergy      HTN (hypertension)      Indigestion     uses OTC Zantac prn     Mild persistent asthma      Morbid obesity due to excess calories (H)      Need for desensitization to allergens 9/03 started at [facility]. for: cat/dog/DM/M/T/G/W    start IT at [facility]: about 1/12      PONV (postoperative nausea and vomiting)      RA (rheumatoid arthritis) (H)      Seasonal allergic rhinitis      Family History   Problem Relation Age of Onset     Cardiovascular Sister         atrial fibrillation     Cerebrovascular Disease Brother         carotid stenosis     Cancer Father         lung     Diabetes Mother      Breast Cancer Maternal Grandmother         also cousin on this side     Breast Cancer Paternal Aunt      C.A.D. No family hx of      Autoimmune Disease No family hx of      Past Surgical History:   Procedure Laterality Date     BACK SURGERY  2009     [facility] SURGICAL PATHOLOGY  1988    left foot     DISCECTOMY LUMBAR POSTERIOR MICROSCOPIC ONE LEVEL  3/18/2013    Procedure: DISCECTOMY LUMBAR POSTERIOR MICROSCOPIC ONE LEVEL;  Left Lumbar 4-5 Micro Discectomy;  Surgeon: Dallas Jensen MD;  Location: [facility]     TONSILLECTOMY & ADENOIDECTOMY  age 3       REVIEW OF SYSTEMS:  General: negative for weight gain. negative for weight loss. negative for changes in sleep.   Ears: negative for fullness. negative for hearing loss. negative for dizziness.   Nose: negative for snoring.negative for changes in smell. negative for drainage.   Eyes: positive  for eye watering. negative for eye itching. negative for vision changes. negative  for eye redness.  Throat: negative for hoarseness. negative for sore throat. negative for trouble swallowing.   Lungs: negative for shortness of breath.negative for wheezing. negative for sputum production.   Cardiovascular: negative for chest pain. negative for swelling of ankles. negative for fast or irregular heartbeat.   Gastrointestinal: negative for nausea. negative for heartburn. Positive for acid reflux.   Musculoskeletal: negative for joint pain. negative for joint stiffness. negative for joint swelling.   Neurologic: negative for seizures. negative for fainting. negative for weakness.   Psychiatric: negative for changes in mood. negative for anxiety.   Endocrine: negative for cold intolerance. negative for heat intolerance. negative for tremors.   Lymphatic: negative for lower extremity swelling. negative for lymph node swelling.   Hematologic: negative for easy bruising. negative for easy bleeding.  Integumentary: negative for rash. negative for scaling. negative for nail changes.       Current Outpatient Medications:      alendronate (FOSAMAX) 70 MG tablet, Take 1 tablet (70 mg) by mouth with 8oz water every 7 days 30 minutes before breakfast and remain upright during this time., Disp: 12 tablet, Rfl: 3     Ascorbic Acid (VITAMIN C PO), Take  by mouth., Disp: , Rfl:      aspirin 81 MG tablet, Take 1 tablet (81 mg) by mouth daily, Disp: 30 tablet, Rfl:      blood glucose monitoring (NO BRAND SPECIFIED) test strip, Use to test blood sugar 1 times daily or as directed. One touch ultra test strips, Disp: 3 Month, Rfl: 3     Calcium Carbonate-Vit D-Min (CALCIUM 1200 PO), Take  by mouth., Disp: , Rfl:      cetirizine (ZYRTEC ALLERGY) 10 MG tablet, Take 1-2 tablets (10-20 mg) by mouth daily as needed for allergies, Disp: 60 tablet, Rfl: 11     Cholecalciferol (VITAMIN D PO), Take  by mouth., Disp: , Rfl:      CRANBERRY, , Disp: , Rfl:      FISH OIL, , Disp: , Rfl:      folic acid (FOLVITE) 1 MG tablet, Take 1  tablet (1 mg) by mouth daily, Disp: 100 tablet, Rfl: 3     hydroxychloroquine (PLAQUENIL) 200 MG tablet, Take 1 tablet (200 mg) by mouth 2 times daily, Disp: 180 tablet, Rfl: 3     hydrOXYzine (ATARAX) 25 MG tablet, TAKE 1 TO 2 TABLETS BY MOUTH EVERY NIGHT AT BEDTIME AS NEEDED FOR ITCH, Disp: 60 tablet, Rfl: 8     ibuprofen 200 MG capsule, Take 200 mg by mouth every 4 hours as needed., Disp: , Rfl:      levalbuterol (XOPENEX HFA) 45 MCG/ACT inhaler, Inhale 2 puffs into the lungs every 4 hours as needed for shortness of breath / dyspnea or wheezing, Disp: 15 g, Rfl: 3     losartan (COZAAR) 100 MG tablet, TAKE 1 TABLET (100 MG) BY MOUTH DAILY, Disp: 90 tablet, Rfl: 3     metFORMIN (GLUCOPHAGE) 1000 MG tablet, Take 1 tablet (1,000 mg) by mouth 2 times daily (with meals), Disp: 180 tablet, Rfl: 3     methotrexate sodium 2.5 MG TABS, Take 8 tablets (20 mg) by mouth once a week . Take all 8 tablets on the same day of each week., Disp: 96 tablet, Rfl: 2     metoprolol succinate (TOPROL-XL) 50 MG 24 hr tablet, Take 1 tablet (50 mg) by mouth daily, Disp: 90 tablet, Rfl: 3     mometasone (ASMANEX 60 METERED DOSES) 220 MCG/INH inhaler, Inhale 1 puff into the lungs daily, Disp: 3 Inhaler, Rfl: 3     Multiple Vitamin (DAILY MULTIVITAMIN PO), Take  by mouth., Disp: , Rfl:      simvastatin (ZOCOR) 10 MG tablet, TAKE 1 TABLET AT BEDTIME, Disp: 90 tablet, Rfl: 1     tacrolimus (PROTOPIC) 0.1 % ointment, Apply to the face daily, already failed desonide, Disp: 60 g, Rfl: 0     triamcinolone (NASACORT) 55 MCG/ACT nasal inhaler, Spray 2 sprays into both nostrils daily as needed , Disp: , Rfl:      levalbuterol (XOPENEX HFA) 45 MCG/ACT inhaler, Inhale 2 puffs into the lungs every 4 hours as needed (Patient not taking: Reported on 3/29/2019), Disp: 1 Inhaler, Rfl: 3     methotrexate sodium 2.5 MG TABS, Take 8 tablets (20 mg) by mouth once a week . Take all 8 tablets on the same day of each week., Disp: 32 tablet, Rfl: 0     XIIDRA 5 %  opthalmic solution, , Disp: , Rfl:   Immunization History   Administered Date(s) Administered     HepB-Adult 08/07/2018     Pneumo Conj 13-V (2010&after) 06/01/2016     Pneumococcal 23 valent 09/07/2016     TD (ADULT, 7+) 12/06/1995, 12/06/2006     TDAP Vaccine (Adacel) 08/16/2017     Allergies   Allergen Reactions     Aleve [Naproxen Sodium] Hives     Hydralazine      Nexium [Esomeprazole Magnesium Trihydrate] Hives     HIVES     Shellfish Allergy Nausea     Sulfa Drugs      Stomach upset     Amlodipine Besylate Rash     Hctz Rash     Lisinopril Rash         EXAM:   Constitutional:  Appears well-developed and well-nourished. No distress.   HEENT:   Head: Normocephalic.   Mouth/Throat: No oropharyngeal exudate present.   No cobblestoning of posterior oropharynx.   Nasal tissue pink and normal appearing.  No rhinorrhea noted.    Eyes: Conjunctivae are non-erythematous   No maxillary or frontal sinus tenderness to palpation.   Cardiovascular: Normal rate, regular rhythm and normal heart sounds. Exam reveals no gallop and no friction rub.   No murmur heard.  Respiratory: Effort normal and breath sounds normal. No respiratory distress. No wheezes. No rales.   Musculoskeletal: Normal range of motion.   Neuro: Oriented to person, place, and time.  Skin: Skin is warm and dry. No rash noted.   Psychiatric: Normal mood and affect.     Nursing note and vitals reviewed.      WORKUP:   Spirometry  FVC % pred:55  FEV1 % pred:58  FEV1/FVC % act:82  FEF 25-75% pred:77    Restrictive spirometry.       ASSESSMENT/PLAN:  Problem List Items Addressed This Visit        Respiratory    Allergic rhinitis due to animal dander    Relevant Medications    mometasone (ASMANEX 60 METERED DOSES) 220 MCG/INH inhaler    levalbuterol (XOPENEX HFA) 45 MCG/ACT inhaler    Mild persistent asthma without complication - Primary     Asthma is currently well controlled. Shortness of breath every other week. She does not have albuterol. She remains on  Asmanex 220  g 1 puff inhaled daily.  Asthma triggers remain perfumes, dogs, dust and smoke. History of rheumatoid arthritis. Follows with rheumatology Dr. Capellan.      Spirometry today with decreased FEV1 and FVC. Worse than when last obtained.   ACT 25     - An asthma action plan was provided and discussed with patient and family.   - Xopenex 2-4 puffs inhaled (use a spacer unless using a Proair Respiclick device) every 4 hours as needed for chest tightness, wheezing, shortness of breath and/or coughing.   - Avoid asthma triggers.  - Continue on Asmanex 220mcg 1 puff daily.   - Complete pulmonary function studies and chest x-ray. Given restriction and history of rheumatoid arthritis.            Relevant Medications    mometasone (ASMANEX 60 METERED DOSES) 220 MCG/INH inhaler    levalbuterol (XOPENEX HFA) 45 MCG/ACT inhaler    Other Relevant Orders    Spirometry, Breathing Capacity (Completed)    Pulmonary Function Test    CHEST X-RAY 2 VW [00235]    Allergic rhinitis due to pollen     Was on allergen immunotherapy. Stopped   in 2016. No worsening since stopping immunotherapy. Still has some mild symptoms year round but worse in the spring and fall. Current treatment includes cetirizine 10 mg by mouth daily as needed and nasacort as needed.      - Nasacort  2 sprays/nostril daily if symptoms are not controlled with oral antihistamine.   - Zyrtec daily as needed for allergy symptoms.          Relevant Medications    mometasone (ASMANEX 60 METERED DOSES) 220 MCG/INH inhaler    levalbuterol (XOPENEX HFA) 45 MCG/ACT inhaler       Musculoskeletal and Integumentary    Seronegative rheumatoid arthritis (H)          Chart documentation with Dragon Voice recognition Software. Although reviewed after completion, some words and grammatical errors may remain.    Mike Fox, DO   Allergy/Immunology  [facility], [place], [place]      Again, thank you for allowing me to participate in the care of your  patient.        Sincerely,        Mike Fox, DO

## 2019-03-30 ENCOUNTER — MYC MEDICAL ADVICE (OUTPATIENT)
Dept: ALLERGY | Facility: OTHER | Age: 59
End: 2019-03-30

## 2019-03-30 DIAGNOSIS — J45.30 MILD PERSISTENT ASTHMA WITHOUT COMPLICATION: ICD-10-CM

## 2019-03-30 ASSESSMENT — ASTHMA QUESTIONNAIRES: ACT_TOTALSCORE: 25

## 2019-04-01 NOTE — TELEPHONE ENCOUNTER
Routing to Dr. Fox to send new script if appropriate. Please see [facility] message.     Tamie Covington RN

## 2019-04-01 NOTE — TELEPHONE ENCOUNTER
Script for asmanex sent to pharmacy. It was sent on 3/29. Not sure why they did not get. I resent today. Thanks.     Dr. Fox

## 2019-04-22 DIAGNOSIS — M06.00 SERONEGATIVE RHEUMATOID ARTHRITIS (H): ICD-10-CM

## 2019-04-22 LAB
ALBUMIN SERPL-MCNC: 3.9 G/DL (ref 3.4–5)
ALP SERPL-CCNC: 70 U/L (ref 40–150)
ALT SERPL W P-5'-P-CCNC: 46 U/L (ref 0–50)
AST SERPL W P-5'-P-CCNC: 37 U/L (ref 0–45)
BASOPHILS # BLD AUTO: 0 10E9/L (ref 0–0.2)
BASOPHILS NFR BLD AUTO: 0.6 %
BILIRUB DIRECT SERPL-MCNC: 0.2 MG/DL (ref 0–0.2)
BILIRUB SERPL-MCNC: 0.7 MG/DL (ref 0.2–1.3)
CREAT SERPL-MCNC: 0.89 MG/DL (ref 0.52–1.04)
DIFFERENTIAL METHOD BLD: ABNORMAL
EOSINOPHIL # BLD AUTO: 0.4 10E9/L (ref 0–0.7)
EOSINOPHIL NFR BLD AUTO: 7.8 %
ERYTHROCYTE [DISTWIDTH] IN BLOOD BY AUTOMATED COUNT: 17.5 % (ref 10–15)
GFR SERPL CREATININE-BSD FRML MDRD: 71 ML/MIN/{1.73_M2}
HCT VFR BLD AUTO: 37.4 % (ref 35–47)
HGB BLD-MCNC: 11.9 G/DL (ref 11.7–15.7)
LYMPHOCYTES # BLD AUTO: 1 10E9/L (ref 0.8–5.3)
LYMPHOCYTES NFR BLD AUTO: 19 %
MCH RBC QN AUTO: 28.9 PG (ref 26.5–33)
MCHC RBC AUTO-ENTMCNC: 31.8 G/DL (ref 31.5–36.5)
MCV RBC AUTO: 91 FL (ref 78–100)
MONOCYTES # BLD AUTO: 0.5 10E9/L (ref 0–1.3)
MONOCYTES NFR BLD AUTO: 8.9 %
NEUTROPHILS # BLD AUTO: 3.4 10E9/L (ref 1.6–8.3)
NEUTROPHILS NFR BLD AUTO: 63.7 %
PLATELET # BLD AUTO: 271 10E9/L (ref 150–450)
PROT SERPL-MCNC: 7.4 G/DL (ref 6.8–8.8)
RBC # BLD AUTO: 4.12 10E12/L (ref 3.8–5.2)
WBC # BLD AUTO: 5.4 10E9/L (ref 4–11)

## 2019-04-22 PROCEDURE — 85025 COMPLETE CBC W/AUTO DIFF WBC: CPT | Performed by: INTERNAL MEDICINE

## 2019-04-22 PROCEDURE — 80076 HEPATIC FUNCTION PANEL: CPT | Performed by: INTERNAL MEDICINE

## 2019-04-22 PROCEDURE — 82565 ASSAY OF CREATININE: CPT | Performed by: INTERNAL MEDICINE

## 2019-04-22 PROCEDURE — 36415 COLL VENOUS BLD VENIPUNCTURE: CPT | Performed by: INTERNAL MEDICINE

## 2019-05-02 ENCOUNTER — TELEPHONE (OUTPATIENT)
Dept: FAMILY MEDICINE | Facility: CLINIC | Age: 59
End: 2019-05-02

## 2019-05-02 DIAGNOSIS — Z91.09 HOUSE DUST MITE ALLERGY: Primary | ICD-10-CM

## 2019-05-02 RX ORDER — HYDROXYZINE PAMOATE 25 MG/1
25-50 CAPSULE ORAL
Qty: 60 CAPSULE | Refills: 1 | Status: SHIPPED | OUTPATIENT
Start: 2019-05-02 | End: 2019-10-01

## 2019-05-02 NOTE — TELEPHONE ENCOUNTER
Signed Prescriptions:                        Disp   Refills    hydrOXYzine (VISTARIL) 25 MG capsule       60 cap*1        Sig: Take 1-2 capsules (25-50 mg) by mouth nightly as           needed for itching  Authorizing Provider: MIKE CARSON

## 2019-05-02 NOTE — TELEPHONE ENCOUNTER
Received fax from pharmacy requesting alternative hydrOXYzine (ATARAX) 25 MG tablet due to backorder. Please advise. Michell Angel MA

## 2019-06-17 PROBLEM — J30.2 SEASONAL ALLERGIC RHINITIS: Status: RESOLVED | Noted: 2017-06-09 | Resolved: 2018-08-07

## 2019-06-29 DIAGNOSIS — M80.00XA AGE-RELATED OSTEOPOROSIS WITH CURRENT PATHOLOGICAL FRACTURE, INITIAL ENCOUNTER: ICD-10-CM

## 2019-07-01 RX ORDER — ALENDRONATE SODIUM 70 MG/1
TABLET ORAL
Qty: 12 TABLET | Refills: 0 | Status: SHIPPED | OUTPATIENT
Start: 2019-07-01 | End: 2019-10-01

## 2019-07-22 DIAGNOSIS — M06.00 SERONEGATIVE RHEUMATOID ARTHRITIS (H): ICD-10-CM

## 2019-07-22 LAB
ALBUMIN SERPL-MCNC: 4 G/DL (ref 3.4–5)
ALP SERPL-CCNC: 58 U/L (ref 40–150)
ALT SERPL W P-5'-P-CCNC: 82 U/L (ref 0–50)
AST SERPL W P-5'-P-CCNC: 60 U/L (ref 0–45)
BASOPHILS # BLD AUTO: 0 10E9/L (ref 0–0.2)
BASOPHILS NFR BLD AUTO: 0.7 %
BILIRUB DIRECT SERPL-MCNC: 0.1 MG/DL (ref 0–0.2)
BILIRUB SERPL-MCNC: 0.5 MG/DL (ref 0.2–1.3)
CREAT SERPL-MCNC: 0.93 MG/DL (ref 0.52–1.04)
CRP SERPL-MCNC: 4.8 MG/L (ref 0–8)
DIFFERENTIAL METHOD BLD: ABNORMAL
EOSINOPHIL # BLD AUTO: 0.4 10E9/L (ref 0–0.7)
EOSINOPHIL NFR BLD AUTO: 9.5 %
ERYTHROCYTE [DISTWIDTH] IN BLOOD BY AUTOMATED COUNT: 17.9 % (ref 10–15)
ERYTHROCYTE [SEDIMENTATION RATE] IN BLOOD BY WESTERGREN METHOD: 10 MM/H (ref 0–30)
GFR SERPL CREATININE-BSD FRML MDRD: 66 ML/MIN/{1.73_M2}
HCT VFR BLD AUTO: 36.9 % (ref 35–47)
HGB BLD-MCNC: 11.8 G/DL (ref 11.7–15.7)
LYMPHOCYTES # BLD AUTO: 1 10E9/L (ref 0.8–5.3)
LYMPHOCYTES NFR BLD AUTO: 22.2 %
MCH RBC QN AUTO: 28.4 PG (ref 26.5–33)
MCHC RBC AUTO-ENTMCNC: 32 G/DL (ref 31.5–36.5)
MCV RBC AUTO: 89 FL (ref 78–100)
MONOCYTES # BLD AUTO: 0.5 10E9/L (ref 0–1.3)
MONOCYTES NFR BLD AUTO: 10.2 %
NEUTROPHILS # BLD AUTO: 2.5 10E9/L (ref 1.6–8.3)
NEUTROPHILS NFR BLD AUTO: 57.4 %
PLATELET # BLD AUTO: 265 10E9/L (ref 150–450)
PROT SERPL-MCNC: 7.3 G/DL (ref 6.8–8.8)
RBC # BLD AUTO: 4.15 10E12/L (ref 3.8–5.2)
WBC # BLD AUTO: 4.4 10E9/L (ref 4–11)

## 2019-07-22 PROCEDURE — 85652 RBC SED RATE AUTOMATED: CPT | Performed by: INTERNAL MEDICINE

## 2019-07-22 PROCEDURE — 86140 C-REACTIVE PROTEIN: CPT | Performed by: INTERNAL MEDICINE

## 2019-07-22 PROCEDURE — 85025 COMPLETE CBC W/AUTO DIFF WBC: CPT | Performed by: INTERNAL MEDICINE

## 2019-07-22 PROCEDURE — 36415 COLL VENOUS BLD VENIPUNCTURE: CPT | Performed by: INTERNAL MEDICINE

## 2019-07-22 PROCEDURE — 80076 HEPATIC FUNCTION PANEL: CPT | Performed by: INTERNAL MEDICINE

## 2019-07-22 PROCEDURE — 82565 ASSAY OF CREATININE: CPT | Performed by: INTERNAL MEDICINE

## 2019-07-25 ENCOUNTER — OFFICE VISIT (OUTPATIENT)
Dept: RHEUMATOLOGY | Facility: CLINIC | Age: 59
End: 2019-07-25
Payer: COMMERCIAL

## 2019-07-25 VITALS
WEIGHT: 244 LBS | SYSTOLIC BLOOD PRESSURE: 124 MMHG | HEART RATE: 78 BPM | DIASTOLIC BLOOD PRESSURE: 74 MMHG | BODY MASS INDEX: 39.38 KG/M2

## 2019-07-25 DIAGNOSIS — R79.89 ELEVATED LFTS: ICD-10-CM

## 2019-07-25 DIAGNOSIS — Z79.899 HIGH RISK MEDICATIONS (NOT ANTICOAGULANTS) LONG-TERM USE: ICD-10-CM

## 2019-07-25 DIAGNOSIS — M06.00 SERONEGATIVE RHEUMATOID ARTHRITIS (H): Primary | ICD-10-CM

## 2019-07-25 PROCEDURE — 99214 OFFICE O/P EST MOD 30 MIN: CPT | Performed by: INTERNAL MEDICINE

## 2019-07-25 RX ORDER — FOLIC ACID 1 MG/1
1 TABLET ORAL DAILY
Qty: 100 TABLET | Refills: 3 | Status: SHIPPED | OUTPATIENT
Start: 2019-07-25 | End: 2020-04-21

## 2019-07-25 RX ORDER — HYDROXYCHLOROQUINE SULFATE 200 MG/1
400 TABLET, FILM COATED ORAL DAILY
Qty: 180 TABLET | Refills: 3 | Status: SHIPPED | OUTPATIENT
Start: 2019-07-25 | End: 2020-04-21

## 2019-07-25 RX ORDER — METHOTREXATE 2.5 MG/1
20 TABLET ORAL WEEKLY
Qty: 96 TABLET | Refills: 2 | Status: SHIPPED | OUTPATIENT
Start: 2019-07-25 | End: 2020-02-13

## 2019-07-25 NOTE — NURSING NOTE
RAPID3 (0-30) Cumulative Score  1.0          RAPID3 Weighted Score (divide #4 by 3 and that is the weighted score)  0.3

## 2019-07-25 NOTE — PROGRESS NOTES
Rheumatology Clinic Visit      Jorge Abarca MRN# [id number]   YOB: 1960 Age: 58 year old      Date of visit: 7/25/19   PCP: Dr. Coy Varela  Ophthalmology: [facility] [place]     Chief Complaint   Patient presents with:  RECHECK  Arthritis: RA  Medication Update: plaquenil dose     Assessment and Plan   1. Seronegative nonerosive rheumatoid arthritis (RF negative, CCP negative): Symptoms began May 2015 and progressively worsened; initially with symmetric synovitis of the PIPs and MCPs and morning stiffness > 1 hour; steroid responsive. Currently on MTX 20mg PO weekly, folic acid 1mg daily, and HCQ 400mg daily.  Doing well today and will continue on the same regimen.   - Continue methotrexate 20mg once weekly  - Continue hydroxychloroquine 400mg daily (ophthalmology exam at [facility] [place])  - Continue folic acid 1mg daily  - Labs in 3 months: CBC, Creatinine, Hepatic Panel  - Labs in 6 months: CBC, Creatinine, Hepatic Panel, ESR, CRP     2. Left 3rd digit trigger finger: Steroid injections have been given on 11/2015, 9/2016, and 6/2017.  Not an issue today.     3. Left second digit trigger finger: Steroid injections were effective when given on 1/2018, and 1/24/2019.  Not an issue today.    4. History of DVT: reportedly associated with birth control; no recurrence reported    5. Bone Health: Following with her PCP for management    6.  Elevated LFTs: One-time mild elevation.  Repeat in 2 weeks to reassess.  -Lab during the week of August 5: Hepatic panel    7.  Vaccinations: Vaccinations reviewed with Ms. Abarca.  Risks and benefits of vaccinations were discussed.   CDC stance on shingrix when on moderate to high immunosuppression was reviewed.   - Influenza: encouraged yearly vaccination  - Yrkiwby64: up to date  - Fysxcegeq81: up to date  - Shingrix: advised receiving    Ms. Abarca verbalized agreement with and understanding of the rational for the diagnosis and  treatment plan.  All questions were answered to best of my ability and the patient's satisfaction. Ms. Abarca was advised to contact the clinic with any questions that may arise after the clinic visit.      Thank you for involving me in the care of the patient    Return to clinic: 6 months    HPI   Jorge Abarca is a 58 year old female with a medical history significant for GERD, diabetes, hypertension, hyperlipidemia, lumbar degenerative disc disease s/p L4-L5 microdiskectomy in 2013, left first toe fracture s/p nonsurgical treatment, allergic rhinitis (receiving allergy shots), asthma, and history of DVT who presents for f/u of seronegative rheumatoid arthritis.     Today, Ms. Abarca reports that she is doing great.  No joint pain.  Morning stiffness for no more than 20 minutes.  No active trigger finger.  Tolerating medications well.  No changes in medication.  No alcohol intake.    Denies fevers, chills, nausea, vomiting, diarrhea. Chronic on and off constipation. No abdominal pain. No chest pain/pressure, palpitations, or shortness of breath. No oral or nasal sores. No neck pain. No LE swelling. No dry mouth. Dry eyes doing well with infrequent use of artificial tears.  No eye pain.    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   SKIN: See history of present illness  HEENT:  No epistaxis. No oral or nasal ulcers.  CV: No chest pain, pressure, palpitations, or dyspnea on exertion.  PULM: No SOB, wheeze, cough.  GI:  No nausea, vomiting, diarrhea. See history of present illness. No blood in stool. No abdominal pain.    : No blood in urine.  MSK: See HPI.  NEURO: No numbness, tingling, or weakness.  EXT: No LE swelling    Active Problem List     Patient Active Problem List   Diagnosis     Hypertension goal BP (blood pressure) < 140/90     Allergy to mold spores     House dust mite allergy     Allergic rhinitis due to animal  dander     Hyperlipidemia LDL goal <70     Degenerative disc disease, lumbar     History of DVT (deep vein thrombosis)     Mild persistent asthma without complication     Seronegative rheumatoid arthritis (H)     High risk medications (not anticoagulants) long-term use     Allergic rhinitis due to pollen     Type 2 diabetes mellitus without complication, without long-term current use of insulin (H)     Morbid obesity due to excess calories (H)     Advanced directives, counseling/discussion     Age-related osteoporosis with current pathological fracture     Past Medical History     Past Medical History:   Diagnosis Date     Allergic rhinitis due to animal dander      Allergy to mold spores     7/03 skin tests per [facility]: pos. for cat/dog/CR/DM/M/T/G/W     Closed compression fracture of first lumbar vertebra, initial encounter (H) 6/13/2018     Diabetes (H)      Diagnostic skin and sensitization tests 7/03 skin tests per [facility]: pos. for cat/dog/CR/DM/M/T/G/W     DVT (deep venous thrombosis) (H)      Eczema     sees [facility]     GERD (gastroesophageal reflux disease)      High cholesterol     occ.     House dust mite allergy      HTN (hypertension)      Indigestion     uses OTC Zantac prn     Mild persistent asthma      Morbid obesity due to excess calories (H)      Need for desensitization to allergens 9/03 started at [facility]. for: cat/dog/DM/M/T/G/W    start IT at [facility]: about 1/12      PONV (postoperative nausea and vomiting)      RA (rheumatoid arthritis) (H)      Seasonal allergic rhinitis      Past Surgical History     Past Surgical History:   Procedure Laterality Date     BACK SURGERY  2009     [facility] AFF SURGICAL PATHOLOGY  1988    left foot     DISCECTOMY LUMBAR POSTERIOR MICROSCOPIC ONE LEVEL  3/18/2013    Procedure: DISCECTOMY LUMBAR POSTERIOR MICROSCOPIC ONE LEVEL;  Left Lumbar 4-5 Micro Discectomy;  Surgeon: Dallas Jensen MD;  Location: [facility]     TONSILLECTOMY & ADENOIDECTOMY  age 3      Allergy     Allergies   Allergen Reactions     Aleve [Naproxen Sodium] Hives     Hydralazine      Nexium [Esomeprazole Magnesium Trihydrate] Hives     HIVES     Shellfish Allergy Nausea     Sulfa Drugs      Stomach upset     Amlodipine Besylate Rash     Hctz Rash     Lisinopril Rash     Current Medication List     Current Outpatient Medications   Medication Sig     alendronate (FOSAMAX) 70 MG tablet FOR DIRECTIONS ON HOW TO   TAKE THIS MEDICINE, READ   THE ENCLOSED MEDICATION    INFORMATION FORM     Ascorbic Acid (VITAMIN C PO) Take  by mouth.     aspirin 81 MG tablet Take 1 tablet (81 mg) by mouth daily     blood glucose monitoring (NO BRAND SPECIFIED) test strip Use to test blood sugar 1 times daily or as directed.  One touch ultra test strips     Calcium Carbonate-Vit D-Min (CALCIUM 1200 PO) Take  by mouth.     cetirizine (ZYRTEC ALLERGY) 10 MG tablet Take 1-2 tablets (10-20 mg) by mouth daily as needed for allergies     Cholecalciferol (VITAMIN D PO) Take  by mouth.     CRANBERRY      FISH OIL      folic acid (FOLVITE) 1 MG tablet Take 1 tablet (1 mg) by mouth daily     hydroxychloroquine (PLAQUENIL) 200 MG tablet Take 2 tablets (400 mg) by mouth daily     hydrOXYzine (VISTARIL) 25 MG capsule Take 1-2 capsules (25-50 mg) by mouth nightly as needed for itching     ibuprofen 200 MG capsule Take 200 mg by mouth every 4 hours as needed.     levalbuterol (XOPENEX HFA) 45 MCG/ACT inhaler Inhale 2 puffs into the lungs every 4 hours as needed for shortness of breath / dyspnea or wheezing     losartan (COZAAR) 100 MG tablet TAKE 1 TABLET (100 MG) BY MOUTH DAILY     metFORMIN (GLUCOPHAGE) 1000 MG tablet Take 1 tablet (1,000 mg) by mouth 2 times daily (with meals)     methotrexate sodium 2.5 MG TABS Take 8 tablets (20 mg) by mouth once a week . Take all 8 tablets on the same day of each week.     metoprolol succinate (TOPROL-XL) 50 MG 24 hr tablet Take 1 tablet (50 mg) by mouth daily     mometasone (ASMANEX 60 METERED  "DOSES) 220 MCG/INH inhaler Inhale 1 puff into the lungs daily     Multiple Vitamin (DAILY MULTIVITAMIN PO) Take  by mouth.     simvastatin (ZOCOR) 10 MG tablet TAKE 1 TABLET AT BEDTIME     tacrolimus (PROTOPIC) 0.1 % ointment Apply to the face daily, already failed desonide     triamcinolone (NASACORT) 55 MCG/ACT nasal inhaler Spray 2 sprays into both nostrils daily as needed      XIIDRA 5 % opthalmic solution      No current facility-administered medications for this visit.      Social History   See HPI    Family History     Family History   Problem Relation Age of Onset     Cardiovascular Sister         atrial fibrillation     Cerebrovascular Disease Brother         carotid stenosis     Cancer Father         lung     Diabetes Mother      Breast Cancer Maternal Grandmother         also cousin on this side     Breast Cancer Paternal Aunt      C.A.D. No family hx of      Autoimmune Disease No family hx of      Physical Exam     Temp Readings from Last 3 Encounters:   03/29/19 96.9  F (36.1  C) (Oral)   08/07/18 97.9  F (36.6  C) (Oral)   06/13/18 98.4  F (36.9  C) (Oral)     BP Readings from Last 5 Encounters:   07/25/19 124/74   03/29/19 130/80   01/24/19 122/84   10/03/18 136/86   08/07/18 (!) 176/98     Pulse Readings from Last 1 Encounters:   07/25/19 78     Resp Readings from Last 1 Encounters:   03/29/19 20     Estimated body mass index is 39.38 kg/m  as calculated from the following:    Height as of 3/29/19: 1.676 m (5' 6\").    Weight as of this encounter: 110.7 kg (244 lb).    GEN: NAD  HEENT: MMM. No oral lesions. Anicteric, non-injected sclera.  CV: S1, S2. RRR. No m/r/g.  PULM: CTA bilaterally. No w/c.  MSK: MCPs, PIPs, wrists, elbows, shoulders, knees, ankles, and MTPs without swelling or tenderness to palpation.  Negative MCP and MTP squeeze.   Hips nontender to palpation.   NEURO: UE and LE strengths 5/5 and equal bilaterally.   SKIN: No rash  EXT: No LE edema  PSYCH: Alert. Appropriate.    Labs "   RF/CCP  Recent Labs   Lab Test 11/10/15  0918 10/26/15  0850   CCPABY <20  Interpretation:  Negative    --    RHF  --  <20     CBC  Recent Labs   Lab Test 07/22/19  1056 04/22/19  1059 01/21/19  1105   WBC 4.4 5.4 5.5   RBC 4.15 4.12 4.15   HGB 11.8 11.9 12.0   HCT 36.9 37.4 37.8   MCV 89 91 91   RDW 17.9* 17.5* 16.9*    271 274   MCH 28.4 28.9 28.9   MCHC 32.0 31.8 31.7   NEUTROPHIL 57.4 63.7 65.7   LYMPH 22.2 19.0 17.4   MONOCYTE 10.2 8.9 8.2   EOSINOPHIL 9.5 7.8 8.2   BASOPHIL 0.7 0.6 0.5   ANEU 2.5 3.4 3.6   ALYM 1.0 1.0 1.0   KOKO 0.5 0.5 0.5   AEOS 0.4 0.4 0.5   ABAS 0.0 0.0 0.0     CMP  Recent Labs   Lab Test 07/22/19  1056 04/22/19  1059 01/21/19  1105  08/07/18  1615  06/28/17  1221  11/23/16  0917   NA  --   --   --   --  140  --  143  --  141   POTASSIUM  --   --   --   --  4.4  --  4.3  --  4.3   CHLORIDE  --   --   --   --  105  --  105  --  103   CO2  --   --   --   --  27  --  29  --  30   ANIONGAP  --   --   --   --  8  --  9  --  8   GLC  --   --   --   --  96  --  87  --  97   BUN  --   --   --   --  21  --  16  --  16   CR 0.93 0.89 0.96   < > 0.91   < > 0.99   < > 0.81   GFRESTIMATED 66 71 64   < > 63   < > 57*   < > 72   GFRESTBLACK 77 82 74   < > 76   < > 69   < > 88   OG  --   --   --   --  9.7  --  9.9  --  9.5   BILITOTAL 0.5 0.7 0.5   < >  --    < >  --    < >  --    ALBUMIN 4.0 3.9 3.7   < >  --    < >  --    < >  --    PROTTOTAL 7.3 7.4 7.1   < >  --    < >  --    < >  --    ALKPHOS 58 70 67   < >  --    < >  --    < >  --    AST 60* 37 34   < >  --    < >  --    < >  --    ALT 82* 46 39   < >  --    < >  --    < >  --     < > = values in this interval not displayed.     Calcium/VitaminD  Recent Labs   Lab Test 08/07/18  1615 06/28/17  1221 11/23/16  0917 11/10/15  0918  02/12/13  0722 12/20/11  0829   OG 9.7 9.9 9.5  --    < > 9.7 10.1   D3VIT  --   --   --   --   --   --  26   VITDT  --   --   --  50  --  23*  --     < > = values in this interval not displayed.      ESR/CRP  Recent Labs   Lab Test 07/22/19  1056 01/21/19  1105 07/23/18  1508   SED 10 10 16   CRP 4.8 4.9 3.6     Lipid Panel  Recent Labs   Lab Test 02/15/19  1001 12/19/17  0916 11/23/16  0917  12/19/14  0947 10/15/14  0704 02/12/13  0722   CHOL 151 127 153   < > 201* 200* 207*   TRIG 80 73 73   < > 66 120 128   HDL 67 73 70   < > 60 56 46*   LDL 68 39 68   < > 128 120 135*   VLDL  --   --   --   --  13 24 26   CHOLHDLRATIO  --   --   --   --  3.4 3.6 4.5   NHDL 84 54 83   < >  --   --   --     < > = values in this interval not displayed.     Hepatitis B  Recent Labs   Lab Test 12/21/15  0844   AUSAB 0.12   HBCAB Nonreactive   HEPBANG Nonreactive     Hepatitis C  Recent Labs   Lab Test 12/21/15  0844   HCVAB Nonreactive   Assay performance characteristics have not been established for newborns,   infants, and children         HIV Screening  Recent Labs   Lab Test 12/21/15  0844   HIAGAB Nonreactive   HIV-1 p24 Ag & HIV-1/HIV-2 Ab Not Detected       Immunization History     Immunization History   Administered Date(s) Administered     HepB-Adult 08/07/2018     Pneumo Conj 13-V (2010&after) 06/01/2016     Pneumococcal 23 valent 09/07/2016     TD (ADULT, 7+) 12/06/1995, 12/06/2006     TDAP Vaccine (Adacel) 08/16/2017          Chart documentation done in part with Dragon Voice recognition Software. Although reviewed after completion, some word and grammatical error may remain.    Chris Capellan MD

## 2019-07-25 NOTE — PATIENT INSTRUCTIONS
Dr. Capellan s Rheumatology Clinics  Locations Clinic Hours Telephone Number     [facility]  [address]  [place] [address]   Wednesday: 7:20AM - 4:00PM  Thursday:     7:20AM - 4:00PM  Friday:          7:20AM - 11:00AM   To schedule an appointment with   Dr. Capellan,   please contact   Specialty [phone]       [facility]  [address]  [place] [address]   Monday:       7:20AM - 4:00PM         [place] [address] [place] [address]   Tuesday:      7:20AM - 4:00PM          [facility] (hours may change)  Locations Pharmacy Hours Telephone Numbers     [facility]  [address] [place] [place]  [place] [address]   Monday - Friday: 8:00AM - 8:00PM  Saturday:             8:00AM - 1:00PM [phone]     [facility]  [address] [facility] [address]  [place] [address]   Monday-Wed:      8:30AM-7:00PM  :          8:30AM-6:00PM  Saturday:             9:00AM-1:00PM [phone]       [facility] (hours may change)  Locations Pharmacy Hours Telephone Numbers     [facility]  [address] [place] [address] [place] [address]   Monday - Friday: 8:00AM - 5:00PM [phone]     [facility]  [address]  [address]  [place] [address]   Monday - Friday: 8:00AM - 4:30PM [phone]     [facility]   [facility]  [address] [place] [address]   Monday - Friday: 8:00AM - 4:30PM [phone]     [facility]  [address]  [address]  [place] [address]   Monday - Friday:   7:30AM - 6:00PM  Saturday-: 7:00AM - 3:30PM [phone]       [facility] (hours may change)  Urgent Care Locations Urgent Care Hours Telephone Numbers     [facility]  [address] [place] [address]     Monday-Friday:    11:00AM - 9:00PM    Saturday-: 9:00AM - 5:00PM   [phone]     [place]  [place]  [address] [facility] [place] [address]     Monday-Friday:     5:00PM - 9:00PM    Saturday-Sunday: 9:00AM - 5:00PM   [phone]     [facility]  [address] [place] [address]   Monday - Sunday: Noon - 8:00PM [phone]

## 2019-08-03 DIAGNOSIS — E11.9 TYPE 2 DIABETES MELLITUS WITHOUT COMPLICATION, WITHOUT LONG-TERM CURRENT USE OF INSULIN (H): ICD-10-CM

## 2019-08-03 DIAGNOSIS — E78.5 HYPERLIPIDEMIA LDL GOAL <100: ICD-10-CM

## 2019-08-05 DIAGNOSIS — M06.00 SERONEGATIVE RHEUMATOID ARTHRITIS (H): ICD-10-CM

## 2019-08-05 LAB
ALBUMIN SERPL-MCNC: 3.9 G/DL (ref 3.4–5)
ALP SERPL-CCNC: 70 U/L (ref 40–150)
ALT SERPL W P-5'-P-CCNC: 54 U/L (ref 0–50)
AST SERPL W P-5'-P-CCNC: 40 U/L (ref 0–45)
BILIRUB DIRECT SERPL-MCNC: 0.2 MG/DL (ref 0–0.2)
BILIRUB SERPL-MCNC: 0.6 MG/DL (ref 0.2–1.3)
PROT SERPL-MCNC: 7.4 G/DL (ref 6.8–8.8)

## 2019-08-05 PROCEDURE — 36415 COLL VENOUS BLD VENIPUNCTURE: CPT | Performed by: INTERNAL MEDICINE

## 2019-08-05 PROCEDURE — 80076 HEPATIC FUNCTION PANEL: CPT | Performed by: INTERNAL MEDICINE

## 2019-08-06 RX ORDER — SIMVASTATIN 10 MG
TABLET ORAL
Qty: 90 TABLET | Refills: 1 | Status: SHIPPED | OUTPATIENT
Start: 2019-08-06 | End: 2020-01-22

## 2019-08-06 NOTE — TELEPHONE ENCOUNTER
Routing refill request to provider for review/approval because:  Labs not current:      Requested Prescriptions   Pending Prescriptions Disp Refills     metFORMIN (GLUCOPHAGE) 1000 MG tablet [Pharmacy Med Name: METFORMIN TAB 1000MG]  Last Written Prescription Date:  8/7/18  Last Fill Quantity: 180,  # refills: 3   Last office visit: 8/7/2018 with prescribing provider:     Future Office Visit:   180 tablet 3     Sig: TAKE 1 TABLET TWICE DAILY  WITH MEALS       Biguanide Agents Failed - 8/5/2019  8:23 AM        Failed - Patient has had a Microalbumin in the past 15 mos.     Recent Labs   Lab Test 12/19/17  0925   MICROL 12   UMALCR 7.14             Passed - Blood pressure less than 140/90 in past 6 months     BP Readings from Last 3 Encounters:   07/25/19 124/74   03/29/19 130/80   01/24/19 122/84                 Passed - Patient has documented LDL within the past 12 mos.     Recent Labs   Lab Test 02/15/19  1001   LDL 68             Passed - Patient is age 10 or older        Passed - Patient has documented A1c within the specified period of time.     If HgbA1C is 8 or greater, it needs to be on file within the past 3 months.  If less than 8, must be on file within the past 6 months.     Recent Labs   Lab Test 02/15/19  1001   A1C 6.7*             Passed - Patient's CR is NOT>1.4 OR Patient's EGFR is NOT<45 within past 12 mos.     Recent Labs   Lab Test 07/22/19  1056   GFRESTIMATED 66   GFRESTBLACK 77       Recent Labs   Lab Test 07/22/19  1056   CR 0.93             Passed - Patient does NOT have a diagnosis of CHF.        Passed - Medication is active on med list        Passed - Patient is not pregnant        Passed - Patient has not had a positive pregnancy test within the past 12 mos.         Passed - Recent (6 mo) or future (30 days) visit within the authorizing provider's specialty     Patient had office visit in the last 6 months or has a visit in the next 30 days with authorizing provider or within the  "authorizing provider's specialty.  See \"Patient Info\" tab in inbasket, or \"Choose Columns\" in Meds & Orders section of the refill encounter.            Kimber Camacho RN - BC      "

## 2019-08-09 NOTE — RESULT ENCOUNTER NOTE
"[facility] ([facility]) message sent:  \"Ms. Abarca,    Liver function tests are improved.  ALT is now just above the normal range.  AST is normal.     Therefore, no change to the medications is needed.     Sincerely,  Chris Capellan MD  8/8/2019 11:18 PM\""

## 2019-08-23 DIAGNOSIS — I10 ESSENTIAL HYPERTENSION WITH GOAL BLOOD PRESSURE LESS THAN 140/90: ICD-10-CM

## 2019-08-27 RX ORDER — METOPROLOL SUCCINATE 50 MG/1
50 TABLET, EXTENDED RELEASE ORAL DAILY
Qty: 90 TABLET | Refills: 0 | Status: SHIPPED | OUTPATIENT
Start: 2019-08-27 | End: 2019-11-04

## 2019-08-27 RX ORDER — LOSARTAN POTASSIUM 100 MG/1
TABLET ORAL
Qty: 90 TABLET | Refills: 0 | Status: SHIPPED | OUTPATIENT
Start: 2019-08-27 | End: 2019-11-04

## 2019-09-21 DIAGNOSIS — M80.00XA AGE-RELATED OSTEOPOROSIS WITH CURRENT PATHOLOGICAL FRACTURE, INITIAL ENCOUNTER: ICD-10-CM

## 2019-09-24 NOTE — TELEPHONE ENCOUNTER
Left patient VM to return call to purple team to schedule for a physical  Hli ADOLPH Escalera on 9/24/2019 at 8:50 AM

## 2019-09-26 RX ORDER — ALENDRONATE SODIUM 70 MG/1
TABLET ORAL
Qty: 4 TABLET | Refills: 0
Start: 2019-09-26

## 2019-09-26 NOTE — TELEPHONE ENCOUNTER
Spoke with patient she noted she did not request this medication. Patient also noted she will call to schedule physical exam once she know her schedule. BERNARD Cortez

## 2019-09-30 ENCOUNTER — ALLIED HEALTH/NURSE VISIT (OUTPATIENT)
Dept: NURSING | Facility: CLINIC | Age: 59
End: 2019-09-30
Payer: COMMERCIAL

## 2019-09-30 DIAGNOSIS — Z23 NEED FOR PROPHYLACTIC VACCINATION AND INOCULATION AGAINST INFLUENZA: Primary | ICD-10-CM

## 2019-09-30 PROCEDURE — 90471 IMMUNIZATION ADMIN: CPT

## 2019-09-30 PROCEDURE — 90682 RIV4 VACC RECOMBINANT DNA IM: CPT

## 2019-10-01 ENCOUNTER — MYC REFILL (OUTPATIENT)
Dept: FAMILY MEDICINE | Facility: CLINIC | Age: 59
End: 2019-10-01

## 2019-10-01 DIAGNOSIS — M80.00XA AGE-RELATED OSTEOPOROSIS WITH CURRENT PATHOLOGICAL FRACTURE, INITIAL ENCOUNTER: ICD-10-CM

## 2019-10-01 DIAGNOSIS — Z91.09 HOUSE DUST MITE ALLERGY: ICD-10-CM

## 2019-10-03 RX ORDER — HYDROXYZINE PAMOATE 25 MG/1
25-50 CAPSULE ORAL
Qty: 60 CAPSULE | Refills: 1 | Status: SHIPPED | OUTPATIENT
Start: 2019-10-03

## 2019-10-03 RX ORDER — ALENDRONATE SODIUM 70 MG/1
TABLET ORAL
Qty: 12 TABLET | Refills: 0 | Status: SHIPPED | OUTPATIENT
Start: 2019-10-03 | End: 2019-10-08

## 2019-10-03 NOTE — TELEPHONE ENCOUNTER
Signed Prescriptions:                        Disp   Refills    hydrOXYzine (VISTARIL) 25 MG capsule       60 cap*1        Sig: Take 1-2 capsules (25-50 mg) by mouth nightly as           needed for itching  Authorizing Provider: MIKE CARSON  Ordering User: JAZZMINE RIBEIRO    alendronate (FOSAMAX) 70 MG tablet         12 tab*0        Sig: FOR DIRECTIONS ON HOW TO   TAKE THIS MEDICINE, READ             THE ENCLOSED MEDICATION    INFORMATION FORM  Authorizing Provider: MIKE CARSON  Ordering User: JAZZMINE RIBEIRO RN

## 2019-10-08 ENCOUNTER — TELEPHONE (OUTPATIENT)
Dept: FAMILY MEDICINE | Facility: CLINIC | Age: 59
End: 2019-10-08

## 2019-10-08 DIAGNOSIS — M80.00XA AGE-RELATED OSTEOPOROSIS WITH CURRENT PATHOLOGICAL FRACTURE, INITIAL ENCOUNTER: ICD-10-CM

## 2019-10-08 RX ORDER — ALENDRONATE SODIUM 70 MG/1
70 TABLET ORAL
Qty: 12 TABLET | Refills: 0 | Status: SHIPPED | OUTPATIENT
Start: 2019-10-08 | End: 2019-12-13

## 2019-10-08 NOTE — TELEPHONE ENCOUNTER
Reason for Call:  Other prescription    Detailed comments: Pharmacy calling to get clarification on directions for prescription of  Fosamax 70mg. States that this is the final time their going to contact the office and until they get a call back the prescription will be on hold. Please call to advise.     Phone Number Patient can be reached at: Other phone number:  [phone]    Best Time: any     Can we leave a detailed message on this number? YES    Call taken on 10/8/2019 at 9:46 AM by Gabriella Campbell

## 2019-10-08 NOTE — TELEPHONE ENCOUNTER
Please clarify directions for Fosamax, what dosage and how often should patient take?    Sita Wilkerson RN

## 2019-10-21 DIAGNOSIS — M06.00 SERONEGATIVE RHEUMATOID ARTHRITIS (H): ICD-10-CM

## 2019-10-21 LAB
ALBUMIN SERPL-MCNC: 4 G/DL (ref 3.4–5)
ALP SERPL-CCNC: 71 U/L (ref 40–150)
ALT SERPL W P-5'-P-CCNC: 62 U/L (ref 0–50)
AST SERPL W P-5'-P-CCNC: 44 U/L (ref 0–45)
BASOPHILS # BLD AUTO: 0 10E9/L (ref 0–0.2)
BASOPHILS NFR BLD AUTO: 0.4 %
BILIRUB DIRECT SERPL-MCNC: 0.2 MG/DL (ref 0–0.2)
BILIRUB SERPL-MCNC: 0.6 MG/DL (ref 0.2–1.3)
CREAT SERPL-MCNC: 0.89 MG/DL (ref 0.52–1.04)
DIFFERENTIAL METHOD BLD: ABNORMAL
EOSINOPHIL # BLD AUTO: 0.3 10E9/L (ref 0–0.7)
EOSINOPHIL NFR BLD AUTO: 6.4 %
ERYTHROCYTE [DISTWIDTH] IN BLOOD BY AUTOMATED COUNT: 17.9 % (ref 10–15)
GFR SERPL CREATININE-BSD FRML MDRD: 70 ML/MIN/{1.73_M2}
HCT VFR BLD AUTO: 35.9 % (ref 35–47)
HGB BLD-MCNC: 11.4 G/DL (ref 11.7–15.7)
LYMPHOCYTES # BLD AUTO: 0.8 10E9/L (ref 0.8–5.3)
LYMPHOCYTES NFR BLD AUTO: 16.1 %
MCH RBC QN AUTO: 28.2 PG (ref 26.5–33)
MCHC RBC AUTO-ENTMCNC: 31.8 G/DL (ref 31.5–36.5)
MCV RBC AUTO: 89 FL (ref 78–100)
MONOCYTES # BLD AUTO: 0.5 10E9/L (ref 0–1.3)
MONOCYTES NFR BLD AUTO: 9.9 %
NEUTROPHILS # BLD AUTO: 3.3 10E9/L (ref 1.6–8.3)
NEUTROPHILS NFR BLD AUTO: 67.2 %
PLATELET # BLD AUTO: 276 10E9/L (ref 150–450)
PROT SERPL-MCNC: 7.1 G/DL (ref 6.8–8.8)
RBC # BLD AUTO: 4.04 10E12/L (ref 3.8–5.2)
WBC # BLD AUTO: 4.9 10E9/L (ref 4–11)

## 2019-10-21 PROCEDURE — 36415 COLL VENOUS BLD VENIPUNCTURE: CPT | Performed by: INTERNAL MEDICINE

## 2019-10-21 PROCEDURE — 80076 HEPATIC FUNCTION PANEL: CPT | Performed by: INTERNAL MEDICINE

## 2019-10-21 PROCEDURE — 82565 ASSAY OF CREATININE: CPT | Performed by: INTERNAL MEDICINE

## 2019-10-21 PROCEDURE — 85025 COMPLETE CBC W/AUTO DIFF WBC: CPT | Performed by: INTERNAL MEDICINE

## 2019-10-24 DIAGNOSIS — E11.9 TYPE 2 DIABETES MELLITUS WITHOUT COMPLICATION, WITHOUT LONG-TERM CURRENT USE OF INSULIN (H): ICD-10-CM

## 2019-11-04 ENCOUNTER — OFFICE VISIT (OUTPATIENT)
Dept: FAMILY MEDICINE | Facility: CLINIC | Age: 59
End: 2019-11-04
Payer: COMMERCIAL

## 2019-11-04 VITALS
DIASTOLIC BLOOD PRESSURE: 104 MMHG | OXYGEN SATURATION: 98 % | WEIGHT: 245 LBS | BODY MASS INDEX: 39.37 KG/M2 | SYSTOLIC BLOOD PRESSURE: 180 MMHG | HEART RATE: 80 BPM | TEMPERATURE: 98.4 F | HEIGHT: 66 IN

## 2019-11-04 DIAGNOSIS — E11.9 TYPE 2 DIABETES MELLITUS WITHOUT COMPLICATION, WITHOUT LONG-TERM CURRENT USE OF INSULIN (H): Primary | ICD-10-CM

## 2019-11-04 DIAGNOSIS — I10 ESSENTIAL HYPERTENSION WITH GOAL BLOOD PRESSURE LESS THAN 140/90: ICD-10-CM

## 2019-11-04 LAB
CREAT UR-MCNC: 123 MG/DL
HBA1C MFR BLD: 6.9 % (ref 0–5.6)
MICROALBUMIN UR-MCNC: 9 MG/L
MICROALBUMIN/CREAT UR: 7.28 MG/G CR (ref 0–25)

## 2019-11-04 PROCEDURE — 36415 COLL VENOUS BLD VENIPUNCTURE: CPT | Performed by: NURSE PRACTITIONER

## 2019-11-04 PROCEDURE — 82043 UR ALBUMIN QUANTITATIVE: CPT | Performed by: NURSE PRACTITIONER

## 2019-11-04 PROCEDURE — 83036 HEMOGLOBIN GLYCOSYLATED A1C: CPT | Performed by: NURSE PRACTITIONER

## 2019-11-04 PROCEDURE — 99214 OFFICE O/P EST MOD 30 MIN: CPT | Performed by: NURSE PRACTITIONER

## 2019-11-04 RX ORDER — METOPROLOL SUCCINATE 50 MG/1
50 TABLET, EXTENDED RELEASE ORAL DAILY
Qty: 90 TABLET | Refills: 0 | Status: SHIPPED | OUTPATIENT
Start: 2019-11-04 | End: 2019-11-18

## 2019-11-04 RX ORDER — LOSARTAN POTASSIUM 100 MG/1
TABLET ORAL
Qty: 90 TABLET | Refills: 3 | Status: SHIPPED | OUTPATIENT
Start: 2019-11-04 | End: 2020-04-20

## 2019-11-04 ASSESSMENT — ASTHMA QUESTIONNAIRES
QUESTION_5 LAST FOUR WEEKS HOW WOULD YOU RATE YOUR ASTHMA CONTROL: COMPLETELY CONTROLLED
QUESTION_2 LAST FOUR WEEKS HOW OFTEN HAVE YOU HAD SHORTNESS OF BREATH: NOT AT ALL
QUESTION_4 LAST FOUR WEEKS HOW OFTEN HAVE YOU USED YOUR RESCUE INHALER OR NEBULIZER MEDICATION (SUCH AS ALBUTEROL): NOT AT ALL
ACT_TOTALSCORE: 25
QUESTION_3 LAST FOUR WEEKS HOW OFTEN DID YOUR ASTHMA SYMPTOMS (WHEEZING, COUGHING, SHORTNESS OF BREATH, CHEST TIGHTNESS OR PAIN) WAKE YOU UP AT NIGHT OR EARLIER THAN USUAL IN THE MORNING: NOT AT ALL
QUESTION_1 LAST FOUR WEEKS HOW MUCH OF THE TIME DID YOUR ASTHMA KEEP YOU FROM GETTING AS MUCH DONE AT WORK, SCHOOL OR AT HOME: NONE OF THE TIME

## 2019-11-04 ASSESSMENT — MIFFLIN-ST. JEOR: SCORE: 1693.06

## 2019-11-04 NOTE — PROGRESS NOTES
Subjective     Jorge Abarca is a 59 year old female who presents to clinic today for the following health issues:    HPI     Diabetes Follow-up    How often are you checking your blood sugar? rarely  What time of day are you checking your blood sugars (select all that apply)?  Not applicable  Have you had any blood sugars above 200?  No  Have you had any blood sugars below 70?  No    What symptoms do you notice when your blood sugar is low?  None    What concerns do you have today about your diabetes? None     Do you have any of these symptoms? (Select all that apply)  No numbness or tingling in feet.  No redness, sores or blisters on feet.  No complaints of excessive thirst.  No reports of blurry vision.  No significant changes to weight.     Have you had a diabetic eye exam in the last 12 months? Yes- Date of last eye exam: February (patient filled out ISIDORO to have exam sent to us)    BP Readings from Last 2 Encounters:   11/04/19 (!) 154/100   07/25/19 124/74     Hemoglobin A1C (%)   Date Value   02/15/2019 6.7 (H)   08/07/2018 6.5 (H)     LDL Cholesterol Calculated (mg/dL)   Date Value   02/15/2019 68   12/19/2017 39       Diabetes Management Resources      How many servings of fruits and vegetables do you eat daily?  0-1    On average, how many sweetened beverages do you drink each day (soda, juice, sweet tea, etc)?   0    How many days per week do you miss taking your medication? 0    Hypertension Follow-up      Do you check your blood pressure regularly outside of the clinic? Yes     Are you following a low salt diet? Yes    Are your blood pressures ever more than 140 on the top number (systolic) OR more   than 90 on the bottom number (diastolic), for example 140/90? No      Patient Active Problem List   Diagnosis     Hypertension goal BP (blood pressure) < 140/90     Allergy to mold spores     House dust mite allergy     Allergic rhinitis due to animal dander     Hyperlipidemia LDL goal <70      "Degenerative disc disease, lumbar     History of DVT (deep vein thrombosis)     Mild persistent asthma without complication     Seronegative rheumatoid arthritis (H)     High risk medications (not anticoagulants) long-term use     Allergic rhinitis due to pollen     Type 2 diabetes mellitus without complication, without long-term current use of insulin (H)     Morbid obesity due to excess calories (H)     Advanced directives, counseling/discussion     Age-related osteoporosis with current pathological fracture     Past Surgical History:   Procedure Laterality Date     BACK SURGERY  2009     [facility] AFF SURGICAL PATHOLOGY  1988    left foot     DISCECTOMY LUMBAR POSTERIOR MICROSCOPIC ONE LEVEL  3/18/2013    Procedure: DISCECTOMY LUMBAR POSTERIOR MICROSCOPIC ONE LEVEL;  Left Lumbar 4-5 Micro Discectomy;  Surgeon: Dallas Jensen MD;  Location: [facility]     TONSILLECTOMY & ADENOIDECTOMY  age 3       Social History     Tobacco Use     Smoking status: Never Smoker     Smokeless tobacco: Never Used   Substance Use Topics     Alcohol use: No     Family History   Problem Relation Age of Onset     Cardiovascular Sister         atrial fibrillation     Cerebrovascular Disease Brother         carotid stenosis     Cancer Father         lung     Diabetes Mother      Breast Cancer Maternal Grandmother         also cousin on this side     Breast Cancer Paternal Aunt      C.A.D. No family hx of      Autoimmune Disease No family hx of            Has had some mild URI symptoms for last several days, taking Ibuprofen but not decongestants.      Reviewed and updated as needed this visit by Provider         Review of Systems   ROS COMP: Constitutional, HEENT, cardiovascular, pulmonary, gi and gu systems are negative, except as otherwise noted.      Objective    BP (!) 180/104   Pulse 80   Temp 98.4  F (36.9  C) (Oral)   Ht 1.676 m (5' 6\")   Wt 111.1 kg (245 lb)   LMP 03/04/2011   SpO2 98%   BMI 39.54 kg/m    Body mass index " is 39.54 kg/m .  Physical Exam   GENERAL: healthy, alert and no distress  RESP: lungs clear to auscultation - no rales, rhonchi or wheezes  CV: regular rate and rhythm, normal S1 S2, no S3 or S4, no murmur, click or rub, no peripheral edema and peripheral pulses strong  Diabetic foot exam: normal DP and PT pulses, no trophic changes or ulcerative lesions, normal sensory exam and normal monofilament exam    Diagnostic Test Results:  Labs reviewed in Epic  Results for orders placed or performed in visit on 11/04/19 (from the past 24 hour(s))   HEMOGLOBIN A1C   Result Value Ref Range    Hemoglobin A1C 6.9 (H) 0 - 5.6 %           Assessment & Plan     1. Type 2 diabetes mellitus without complication, without long-term current use of insulin (H)  Well controlled, continue current medications without change.  - Albumin Random Urine Quantitative with Creat Ratio  - HEMOGLOBIN A1C  - metFORMIN (GLUCOPHAGE) 1000 MG tablet; TAKE 1 TABLET TWICE DAILY  WITH MEALs  Dispense: 180 tablet; Refill: 1    2. Essential hypertension with goal blood pressure less than 140/90  BP very elevated today, but has been controlled outside the clinic & patient asymptomatic. Has been ill with URI- recommend monitoring BP at home daily and return for ancillary BP check in 2 weeks (patient not available sooner). Avoid Ibuprofen, may use Tylenol. Patient will also contact me if her home BPs are consistently high. Plan to increase Metoprolol if BP remains elevated.   - metoprolol succinate ER (TOPROL-XL) 50 MG 24 hr tablet; Take 1 tablet (50 mg) by mouth daily  Dispense: 90 tablet; Refill: 0  - losartan (COZAAR) 100 MG tablet; TAKE 1 TABLET (100 MG) BY MOUTH DAILY.  Dispense: 90 tablet; Refill: 3     See Patient Instructions    Return in about 2 weeks (around 11/18/2019) for Nurse only BP check.    LUKE Garrido [facility]

## 2019-11-04 NOTE — Clinical Note
Please abstract the following data from this visit with this patient into the appropriate field in Epic:Tests that can be patient reported without a hard copy:Eye exam with ophthalmology on this date: 2/2019Other Tests found in the patient's chart through Chart Review/Care Everywhere:{Abstract Quality List (Optional):257902}Note to Abstraction: If this section is blank, no results were found via Chart Review/Care Everywhere.

## 2019-11-05 ASSESSMENT — ASTHMA QUESTIONNAIRES: ACT_TOTALSCORE: 25

## 2019-11-18 ENCOUNTER — ALLIED HEALTH/NURSE VISIT (OUTPATIENT)
Dept: NURSING | Facility: CLINIC | Age: 59
End: 2019-11-18
Payer: COMMERCIAL

## 2019-11-18 VITALS — OXYGEN SATURATION: 98 % | SYSTOLIC BLOOD PRESSURE: 130 MMHG | DIASTOLIC BLOOD PRESSURE: 82 MMHG | HEART RATE: 87 BPM

## 2019-11-18 DIAGNOSIS — Z01.30 BP CHECK: Primary | ICD-10-CM

## 2019-11-18 DIAGNOSIS — I10 ESSENTIAL HYPERTENSION WITH GOAL BLOOD PRESSURE LESS THAN 140/90: ICD-10-CM

## 2019-11-18 RX ORDER — METOPROLOL SUCCINATE 50 MG/1
50 TABLET, EXTENDED RELEASE ORAL DAILY
Qty: 90 TABLET | Refills: 3 | Status: SHIPPED | OUTPATIENT
Start: 2019-11-18 | End: 2020-04-20

## 2019-11-18 NOTE — PROGRESS NOTES
Jorge Abarca is a 59 year old patient who comes in today for a Blood Pressure check.  Initial BP:  /82   Pulse 87   LMP 03/04/2011   SpO2 98%      87  Disposition: results routed to provider  Robyn GARCIA CMA ([facility])

## 2019-12-13 DIAGNOSIS — M80.00XA AGE-RELATED OSTEOPOROSIS WITH CURRENT PATHOLOGICAL FRACTURE, INITIAL ENCOUNTER: ICD-10-CM

## 2019-12-15 RX ORDER — ALENDRONATE SODIUM 70 MG/1
TABLET ORAL
Qty: 12 TABLET | Refills: 1 | Status: SHIPPED | OUTPATIENT
Start: 2019-12-15 | End: 2020-04-20

## 2020-01-10 ENCOUNTER — TELEPHONE (OUTPATIENT)
Dept: FAMILY MEDICINE | Facility: CLINIC | Age: 60
End: 2020-01-10

## 2020-01-10 NOTE — TELEPHONE ENCOUNTER
Received a fax from [facility]   Your patient has not completed the Cologuard test you ordered for them.    [facility] has an outstanding Cologuard order for your Patient.     Order number: [id number]    Phone number [phone]  Fax number [phone]    If, at a later date, your patient returns their kit, we will reactivate this order. The order can be reactivated with in 365 days of the initial order date of: 11/4/2019  Nikki Augustin,

## 2020-01-13 NOTE — TELEPHONE ENCOUNTER
Called and left a message for Jorge and asked her to contact [facility] about the testing Cheryl Gautam CNP had ordered for her to complete. Left the clinic line to contact us to let us know of any barriers for her in getting this testing completed. Nikki Augustin,

## 2020-01-22 DIAGNOSIS — E78.5 HYPERLIPIDEMIA LDL GOAL <100: ICD-10-CM

## 2020-01-22 RX ORDER — SIMVASTATIN 10 MG
TABLET ORAL
Qty: 90 TABLET | Refills: 0 | Status: SHIPPED | OUTPATIENT
Start: 2020-01-22 | End: 2020-04-20

## 2020-01-24 DIAGNOSIS — I10 ESSENTIAL HYPERTENSION WITH GOAL BLOOD PRESSURE LESS THAN 140/90: ICD-10-CM

## 2020-01-24 NOTE — TELEPHONE ENCOUNTER
duplicate    Disp Refills Start End BRITNEY    metoprolol succinate ER (TOPROL-XL) 50 MG 24 hr tablet 90 tablet 3 11/18/2019  No   Sig - Route: Take 1 tablet (50 mg) by mouth daily - Oral   Sent to pharmacy as: metoprolol succinate ER (TOPROL-XL) 50 MG 24 hr tablet   Class: E-Prescribe   Order: [id number]   E-Prescribing Status: Receipt confirmed by pharmacy (11/18/2019  3:20 PM CST)

## 2020-01-27 RX ORDER — METOPROLOL SUCCINATE 50 MG/1
TABLET, EXTENDED RELEASE ORAL
Qty: 90 TABLET | Refills: 0 | OUTPATIENT
Start: 2020-01-27

## 2020-01-27 NOTE — TELEPHONE ENCOUNTER
Refused Prescriptions:                       Disp   Refills    metoprolol succinate ER (TOPROL-XL) 50 MG *90 tab*0        Sig: TAKE 1 TABLET DAILY  Refused By: JOHANNA GRESHAM  Reason for Refusal: Duplicate    Johanna Gresham RN on 1/27/2020 at 4:38 PM

## 2020-02-04 ENCOUNTER — MYC REFILL (OUTPATIENT)
Dept: FAMILY MEDICINE | Facility: CLINIC | Age: 60
End: 2020-02-04

## 2020-02-04 DIAGNOSIS — I10 ESSENTIAL HYPERTENSION WITH GOAL BLOOD PRESSURE LESS THAN 140/90: ICD-10-CM

## 2020-02-04 RX ORDER — METOPROLOL SUCCINATE 50 MG/1
50 TABLET, EXTENDED RELEASE ORAL DAILY
Qty: 90 TABLET | Refills: 3 | Status: CANCELLED | OUTPATIENT
Start: 2020-02-04

## 2020-02-07 DIAGNOSIS — M06.00 SERONEGATIVE RHEUMATOID ARTHRITIS (H): ICD-10-CM

## 2020-02-07 LAB
ALBUMIN SERPL-MCNC: 3.6 G/DL (ref 3.4–5)
ALP SERPL-CCNC: 67 U/L (ref 40–150)
ALT SERPL W P-5'-P-CCNC: 50 U/L (ref 0–50)
AST SERPL W P-5'-P-CCNC: 33 U/L (ref 0–45)
BASOPHILS # BLD AUTO: 0 10E9/L (ref 0–0.2)
BASOPHILS NFR BLD AUTO: 0.5 %
BILIRUB DIRECT SERPL-MCNC: 0.1 MG/DL (ref 0–0.2)
BILIRUB SERPL-MCNC: 0.4 MG/DL (ref 0.2–1.3)
CREAT SERPL-MCNC: 0.83 MG/DL (ref 0.52–1.04)
CRP SERPL-MCNC: 4.1 MG/L (ref 0–8)
DIFFERENTIAL METHOD BLD: ABNORMAL
EOSINOPHIL # BLD AUTO: 0.5 10E9/L (ref 0–0.7)
EOSINOPHIL NFR BLD AUTO: 8.1 %
ERYTHROCYTE [DISTWIDTH] IN BLOOD BY AUTOMATED COUNT: 19.3 % (ref 10–15)
ERYTHROCYTE [SEDIMENTATION RATE] IN BLOOD BY WESTERGREN METHOD: 13 MM/H (ref 0–30)
GFR SERPL CREATININE-BSD FRML MDRD: 76 ML/MIN/{1.73_M2}
HCT VFR BLD AUTO: 35.6 % (ref 35–47)
HGB BLD-MCNC: 11.2 G/DL (ref 11.7–15.7)
LYMPHOCYTES # BLD AUTO: 1.2 10E9/L (ref 0.8–5.3)
LYMPHOCYTES NFR BLD AUTO: 17.3 %
MCH RBC QN AUTO: 27.3 PG (ref 26.5–33)
MCHC RBC AUTO-ENTMCNC: 31.5 G/DL (ref 31.5–36.5)
MCV RBC AUTO: 87 FL (ref 78–100)
MONOCYTES # BLD AUTO: 0.8 10E9/L (ref 0–1.3)
MONOCYTES NFR BLD AUTO: 11.4 %
NEUTROPHILS # BLD AUTO: 4.2 10E9/L (ref 1.6–8.3)
NEUTROPHILS NFR BLD AUTO: 62.7 %
PLATELET # BLD AUTO: 300 10E9/L (ref 150–450)
PROT SERPL-MCNC: 7.1 G/DL (ref 6.8–8.8)
RBC # BLD AUTO: 4.11 10E12/L (ref 3.8–5.2)
WBC # BLD AUTO: 6.6 10E9/L (ref 4–11)

## 2020-02-07 PROCEDURE — 82565 ASSAY OF CREATININE: CPT | Performed by: INTERNAL MEDICINE

## 2020-02-07 PROCEDURE — 86140 C-REACTIVE PROTEIN: CPT | Performed by: INTERNAL MEDICINE

## 2020-02-07 PROCEDURE — 80076 HEPATIC FUNCTION PANEL: CPT | Performed by: INTERNAL MEDICINE

## 2020-02-07 PROCEDURE — 85652 RBC SED RATE AUTOMATED: CPT | Performed by: INTERNAL MEDICINE

## 2020-02-07 PROCEDURE — 85025 COMPLETE CBC W/AUTO DIFF WBC: CPT | Performed by: INTERNAL MEDICINE

## 2020-02-07 PROCEDURE — 36415 COLL VENOUS BLD VENIPUNCTURE: CPT | Performed by: INTERNAL MEDICINE

## 2020-02-13 ENCOUNTER — OFFICE VISIT (OUTPATIENT)
Dept: RHEUMATOLOGY | Facility: CLINIC | Age: 60
End: 2020-02-13
Payer: COMMERCIAL

## 2020-02-13 VITALS
WEIGHT: 247 LBS | OXYGEN SATURATION: 99 % | BODY MASS INDEX: 39.87 KG/M2 | SYSTOLIC BLOOD PRESSURE: 164 MMHG | HEART RATE: 96 BPM | DIASTOLIC BLOOD PRESSURE: 92 MMHG

## 2020-02-13 DIAGNOSIS — M06.00 SERONEGATIVE RHEUMATOID ARTHRITIS (H): Primary | ICD-10-CM

## 2020-02-13 DIAGNOSIS — Z79.899 HIGH RISK MEDICATIONS (NOT ANTICOAGULANTS) LONG-TERM USE: ICD-10-CM

## 2020-02-13 PROCEDURE — 99213 OFFICE O/P EST LOW 20 MIN: CPT | Performed by: INTERNAL MEDICINE

## 2020-02-13 RX ORDER — METHOTREXATE 2.5 MG/1
20 TABLET ORAL WEEKLY
Qty: 96 TABLET | Refills: 1 | Status: SHIPPED | OUTPATIENT
Start: 2020-02-13 | End: 2020-04-21

## 2020-02-13 NOTE — PROGRESS NOTES
RAPID3 (0-30) Cumulative Score  1          RAPID3 Weighted Score (divide #4 by 3 and that is the weighted score)  0.67       Iraj Mullen RN....2/13/2020 11:14 AM

## 2020-02-13 NOTE — PROGRESS NOTES
Rheumatology Clinic Visit      Jorge Abarca MRN# [id number]   YOB: 1960 Age: 59 year old      Date of visit: 2/13/20   PCP: Dr. Coy Varela  Ophthalmology: [facility] [place]     Chief Complaint   Patient presents with:  RECHECK: RA; overrall doing well, planning on moving to [place] in April/May    Assessment and Plan   1. Seronegative nonerosive rheumatoid arthritis (RF negative, CCP negative): Symptoms began May 2015 and progressively worsened; initially with symmetric synovitis of the PIPs and MCPs and morning stiffness > 1 hour; steroid responsive. Currently on MTX 20mg PO weekly, folic acid 1mg daily, and HCQ 400mg daily.  Doing well today and will continue on the same regimen. She plans to move to [place] in late April / early May; check labs in late April before leaving.  She will need to follow-up with rheumatology in [place] for continued management; advised that she call ASAP to schedule as it may take a while to establish care depending on availability.   - Continue methotrexate 20mg once weekly  - Continue hydroxychloroquine 400mg daily (ophthalmology exam at [facility] [place])  - Continue folic acid 1mg daily  - Labs in late April 2020: CBC, Creatinine, Hepatic Panel    2. Left 3rd digit trigger finger: Steroid injections have been given on 11/2015, 9/2016, and 6/2017.  Not an issue today.     3. Left second digit trigger finger: Steroid injections were effective when given on 1/2018, and 1/24/2019.  Not an issue today.    4. History of DVT: reportedly associated with birth control; no recurrence reported    5. Bone Health: Following with her PCP for management    7.  Vaccinations: Vaccinations reviewed with Ms. Abarca.  Risks and benefits of vaccinations were discussed.   CDC stance on shingrix when on moderate to high immunosuppression was reviewed.   - Influenza: encouraged yearly vaccination  - [id number]: up to date  - [id number]: up to date  - Shingrix: advised  receiving and she will consider receiving in the future    Ms. Abarca verbalized agreement with and understanding of the rational for the diagnosis and treatment plan.  All questions were answered to best of my ability and the patient's satisfaction. Ms. Abarca was advised to contact the clinic with any questions that may arise after the clinic visit.      Thank you for involving me in the care of the patient    Return to clinic: she plans to move to [place] and establish with rheumatology there    HPI   Jorge Abarca is a 59 year old female with a medical history significant for GERD, diabetes, hypertension, hyperlipidemia, lumbar degenerative disc disease s/p L4-L5 microdiskectomy in 2013, left first toe fracture s/p nonsurgical treatment, allergic rhinitis (receiving allergy shots), asthma, and history of DVT who presents for f/u of seronegative rheumatoid arthritis.     Today, Ms. Abarca reports that she is doing great.  No joint pain.  Morning stiffness for no more than 20 minutes.  No active trigger finger.  Tolerating medications well.  Moving to [place] in April/May.    Denies fevers, chills, nausea, vomiting, diarrhea. Chronic on and off constipation. No abdominal pain. No chest pain/pressure, palpitations, or shortness of breath. No oral or nasal sores. No neck pain. No LE swelling. No dry mouth. Dry eyes doing well with infrequent use of artificial tears.  No eye pain.    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   SKIN: See history of present illness  HEENT:  No epistaxis. No oral or nasal ulcers.  CV: No chest pain, pressure, palpitations, or dyspnea on exertion.  PULM: No SOB, wheeze, cough.  GI:  No nausea, vomiting, diarrhea. See history of present illness. No blood in stool. No abdominal pain.    : No blood in urine.  MSK: See HPI.  NEURO: No numbness, tingling, or weakness.  EXT: No LE swelling  PSYCH:  Negative    Active Problem List     Patient Active Problem List   Diagnosis     Hypertension goal BP (blood pressure) < 140/90     Allergy to mold spores     House dust mite allergy     Allergic rhinitis due to animal dander     Hyperlipidemia LDL goal <70     Degenerative disc disease, lumbar     History of DVT (deep vein thrombosis)     Mild persistent asthma without complication     Seronegative rheumatoid arthritis (H)     High risk medications (not anticoagulants) long-term use     Allergic rhinitis due to pollen     Type 2 diabetes mellitus without complication, without long-term current use of insulin (H)     Morbid obesity due to excess calories (H)     Advanced directives, counseling/discussion     Age-related osteoporosis with current pathological fracture     Past Medical History     Past Medical History:   Diagnosis Date     Allergic rhinitis due to animal dander      Allergy to mold spores     7/03 skin tests per [facility]: pos. for cat/dog/CR/DM/M/T/G/W     Closed compression fracture of first lumbar vertebra, initial encounter (H) 6/13/2018     Diabetes (H)      Diagnostic skin and sensitization tests 7/03 skin tests per [facility]: pos. for cat/dog/CR/DM/M/T/G/W     DVT (deep venous thrombosis) (H)      Eczema     sees [facility]     GERD (gastroesophageal reflux disease)      High cholesterol     occ.     House dust mite allergy      HTN (hypertension)      Indigestion     uses OTC Zantac prn     Mild persistent asthma      Morbid obesity due to excess calories (H)      Need for desensitization to allergens 9/03 started at [facility]. for: cat/dog/DM/M/T/G/W    start IT at [facility]: about 1/12      PONV (postoperative nausea and vomiting)      RA (rheumatoid arthritis) (H)      Seasonal allergic rhinitis      Past Surgical History     Past Surgical History:   Procedure Laterality Date     BACK SURGERY  2009     CL AFF SURGICAL PATHOLOGY  1988    left foot     DISCECTOMY LUMBAR POSTERIOR MICROSCOPIC ONE LEVEL   3/18/2013    Procedure: DISCECTOMY LUMBAR POSTERIOR MICROSCOPIC ONE LEVEL;  Left Lumbar 4-5 Micro Discectomy;  Surgeon: Dallas Jensen MD;  Location: [facility]     TONSILLECTOMY & ADENOIDECTOMY  age 3     Allergy     Allergies   Allergen Reactions     Aleve [Naproxen Sodium] Hives     Hydralazine      Nexium [Esomeprazole Magnesium Trihydrate] Hives     HIVES     Shellfish Allergy Nausea     Sulfa Drugs      Stomach upset     Amlodipine Besylate Rash     Hctz Rash     Lisinopril Rash     Current Medication List     Current Outpatient Medications   Medication Sig     alendronate (FOSAMAX) 70 MG tablet TAKE 1 TABLET EVERY 7 DAYS     Ascorbic Acid (VITAMIN C PO) Take  by mouth.     aspirin 81 MG tablet Take 1 tablet (81 mg) by mouth daily     Calcium Carbonate-Vit D-Min (CALCIUM 1200 PO) Take  by mouth.     cetirizine (ZYRTEC ALLERGY) 10 MG tablet Take 1-2 tablets (10-20 mg) by mouth daily as needed for allergies     Cholecalciferol (VITAMIN D PO) Take  by mouth.     CRANBERRY      FISH OIL      folic acid (FOLVITE) 1 MG tablet Take 1 tablet (1 mg) by mouth daily     hydroxychloroquine (PLAQUENIL) 200 MG tablet Take 2 tablets (400 mg) by mouth daily     hydrOXYzine (VISTARIL) 25 MG capsule Take 1-2 capsules (25-50 mg) by mouth nightly as needed for itching     ibuprofen 200 MG capsule Take 200 mg by mouth every 4 hours as needed.     levalbuterol (XOPENEX HFA) 45 MCG/ACT inhaler Inhale 2 puffs into the lungs every 4 hours as needed for shortness of breath / dyspnea or wheezing     losartan (COZAAR) 100 MG tablet TAKE 1 TABLET (100 MG) BY MOUTH DAILY.     metFORMIN (GLUCOPHAGE) 1000 MG tablet TAKE 1 TABLET TWICE DAILY  WITH MEALs     methotrexate sodium 2.5 MG TABS Take 8 tablets (20 mg) by mouth once a week . Take all 8 tablets on the same day of each week.     metoprolol succinate ER (TOPROL-XL) 50 MG 24 hr tablet Take 1 tablet (50 mg) by mouth daily     mometasone (ASMANEX 60 METERED DOSES) 220 MCG/INH  "inhaler Inhale 1 puff into the lungs daily     Multiple Vitamin (DAILY MULTIVITAMIN PO) Take  by mouth.     simvastatin (ZOCOR) 10 MG tablet TAKE 1 TABLET AT BEDTIME     tacrolimus (PROTOPIC) 0.1 % ointment Apply to the face daily, already failed desonide     triamcinolone (NASACORT) 55 MCG/ACT nasal inhaler Spray 2 sprays into both nostrils daily as needed      XIIDRA 5 % opthalmic solution      blood glucose monitoring (NO BRAND SPECIFIED) test strip Use to test blood sugar 1 times daily or as directed.  One touch ultra test strips (Patient not taking: Reported on 2/13/2020)     No current facility-administered medications for this visit.      Social History   See HPI    Family History     Family History   Problem Relation Age of Onset     Cardiovascular Sister         atrial fibrillation     Cerebrovascular Disease Brother         carotid stenosis     Cancer Father         lung     Diabetes Mother      Breast Cancer Maternal Grandmother         also cousin on this side     Breast Cancer Paternal Aunt      C.A.D. No family hx of      Autoimmune Disease No family hx of      Physical Exam     Temp Readings from Last 3 Encounters:   11/04/19 98.4  F (36.9  C) (Oral)   03/29/19 96.9  F (36.1  C) (Oral)   08/07/18 97.9  F (36.6  C) (Oral)     BP Readings from Last 5 Encounters:   02/13/20 (!) 164/94   11/18/19 130/82   11/04/19 (!) 180/104   07/25/19 124/74   03/29/19 130/80     Pulse Readings from Last 1 Encounters:   02/13/20 96     Resp Readings from Last 1 Encounters:   03/29/19 20     Estimated body mass index is 39.87 kg/m  as calculated from the following:    Height as of 11/4/19: 1.676 m (5' 6\").    Weight as of this encounter: 112 kg (247 lb).    GEN: NAD  HEENT: MMM. No oral lesions. Anicteric, non-injected sclera.  CV: S1, S2. RRR. No m/r/g.  PULM: CTA bilaterally. No w/c.  MSK: MCPs, PIPs, DIPs, wrists, elbows, shoulders, knees, ankles, and MTPs without swelling or tenderness to palpation.  Negative MCP and " MTP squeeze.   Hips nontender to palpation.  NEURO: UE and LE strengths 5/5 and equal bilaterally.   SKIN: No rash  EXT: No LE edema  PSYCH: Alert. Appropriate.    Labs   RF/CCP  Recent Labs   Lab Test 11/10/15  0918 10/26/15  0850   CCPABY <20  Interpretation:  Negative    --    RHF  --  <20     CBC  Recent Labs   Lab Test 02/07/20  1055 10/21/19  1113 07/22/19  1056   WBC 6.6 4.9 4.4   RBC 4.11 4.04 4.15   HGB 11.2* 11.4* 11.8   HCT 35.6 35.9 36.9   MCV 87 89 89   RDW 19.3* 17.9* 17.9*    276 265   MCH 27.3 28.2 28.4   MCHC 31.5 31.8 32.0   NEUTROPHIL 62.7 67.2 57.4   LYMPH 17.3 16.1 22.2   MONOCYTE 11.4 9.9 10.2   EOSINOPHIL 8.1 6.4 9.5   BASOPHIL 0.5 0.4 0.7   ANEU 4.2 3.3 2.5   ALYM 1.2 0.8 1.0   KOKO 0.8 0.5 0.5   AEOS 0.5 0.3 0.4   ABAS 0.0 0.0 0.0     CMP  Recent Labs   Lab Test 02/07/20  1055 10/21/19  1113 08/05/19  1059 07/22/19  1056  08/07/18  1615  06/28/17  1221  11/23/16  0917   NA  --   --   --   --   --  140  --  143  --  141   POTASSIUM  --   --   --   --   --  4.4  --  4.3  --  4.3   CHLORIDE  --   --   --   --   --  105  --  105  --  103   CO2  --   --   --   --   --  27  --  29  --  30   ANIONGAP  --   --   --   --   --  8  --  9  --  8   GLC  --   --   --   --   --  96  --  87  --  97   BUN  --   --   --   --   --  21  --  16  --  16   CR 0.83 0.89  --  0.93   < > 0.91   < > 0.99   < > 0.81   GFRESTIMATED 76 70  --  66   < > 63   < > 57*   < > 72   GFRESTBLACK 88 81  --  77   < > 76   < > 69   < > 88   OG  --   --   --   --   --  9.7  --  9.9  --  9.5   BILITOTAL 0.4 0.6 0.6 0.5   < >  --    < >  --    < >  --    ALBUMIN 3.6 4.0 3.9 4.0   < >  --    < >  --    < >  --    PROTTOTAL 7.1 7.1 7.4 7.3   < >  --    < >  --    < >  --    ALKPHOS 67 71 70 58   < >  --    < >  --    < >  --    AST 33 44 40 60*   < >  --    < >  --    < >  --    ALT 50 62* 54* 82*   < >  --    < >  --    < >  --     < > = values in this interval not displayed.     Calcium/VitaminD  Recent Labs   Lab Test  08/07/18  1615 06/28/17  1221 11/23/16  0917 11/10/15  0918  02/12/13  0722 12/20/11  0829   OG 9.7 9.9 9.5  --    < > 9.7 10.1   D3VIT  --   --   --   --   --   --  26   VITDT  --   --   --  50  --  23*  --     < > = values in this interval not displayed.     ESR/CRP  Recent Labs   Lab Test 02/07/20  1055 07/22/19  1056 01/21/19  1105   SED 13 10 10   CRP 4.1 4.8 4.9     Lipid Panel  Recent Labs   Lab Test 02/15/19  1001 12/19/17  0916 11/23/16  0917  12/19/14  0947 10/15/14  0704 02/12/13  0722   CHOL 151 127 153   < > 201* 200* 207*   TRIG 80 73 73   < > 66 120 128   HDL 67 73 70   < > 60 56 46*   LDL 68 39 68   < > 128 120 135*   VLDL  --   --   --   --  13 24 26   CHOLHDLRATIO  --   --   --   --  3.4 3.6 4.5   NHDL 84 54 83   < >  --   --   --     < > = values in this interval not displayed.     Hepatitis B  Recent Labs   Lab Test 12/21/15  0844   AUSAB 0.12   HBCAB Nonreactive   HEPBANG Nonreactive     Hepatitis C  Recent Labs   Lab Test 12/21/15  0844   HCVAB Nonreactive   Assay performance characteristics have not been established for newborns,   infants, and children       HIV Screening  Recent Labs   Lab Test 12/21/15  0844   HIAGAB Nonreactive   HIV-1 p24 Ag & HIV-1/HIV-2 Ab Not Detected       Immunization History     Immunization History   Administered Date(s) Administered     HepB-Adult 08/07/2018     Influenza Quad, Recombinant, p-free (RIV4) 09/30/2019     Pneumo Conj 13-V (2010&after) 06/01/2016     Pneumococcal 23 valent 09/07/2016     TD (ADULT, 7+) 12/06/1995, 12/06/2006     TDAP Vaccine (Adacel) 08/16/2017          Chart documentation done in part with Dragon Voice recognition Software. Although reviewed after completion, some word and grammatical error may remain.    Chris Capellan MD

## 2020-02-14 ENCOUNTER — TELEPHONE (OUTPATIENT)
Dept: FAMILY MEDICINE | Facility: CLINIC | Age: 60
End: 2020-02-14

## 2020-02-14 NOTE — TELEPHONE ENCOUNTER
Panel Management Review      Patient has the following on her problem list:     Diabetes    ASA: Passed    Last A1C  Lab Results   Component Value Date    A1C 6.9 11/04/2019    A1C 6.7 02/15/2019    A1C 6.5 08/07/2018    A1C 6.4 12/19/2017    A1C 5.7 08/16/2017     A1C tested: MONITOR    Last LDL:    Lab Results   Component Value Date    CHOL 151 02/15/2019     Lab Results   Component Value Date    HDL 67 02/15/2019     Lab Results   Component Value Date    LDL 68 02/15/2019     Lab Results   Component Value Date    TRIG 80 02/15/2019     Lab Results   Component Value Date    CHOLHDLRATIO 3.4 12/19/2014     Lab Results   Component Value Date    NHDL 84 02/15/2019       Is the patient on a Statin? YES             Is the patient on Aspirin? YES    Medications     HMG CoA Reductase Inhibitors     simvastatin (ZOCOR) 10 MG tablet       Salicylates     aspirin 81 MG tablet             Last three blood pressure readings:  BP Readings from Last 3 Encounters:   02/13/20 (!) 164/92   11/18/19 130/82   11/04/19 (!) 180/104       Date of last diabetes office visit: 11/04/2019     Tobacco History:     History   Smoking Status     Never Smoker   Smokeless Tobacco     Never Used         Hypertension   Last three blood pressure readings:  BP Readings from Last 3 Encounters:   02/13/20 (!) 164/92   11/18/19 130/82   11/04/19 (!) 180/104     Blood pressure: FAILED    HTN Guidelines:  Less than 140/90      Composite cancer screening  Chart review shows that this patient is due/due soon for the following None  Summary:    Patient is due/failing the following:   Patient was into see Rheumatology yesterday and BP was elevated    Action needed:   Routed to provider for review.    Type of outreach:    None, routed to provider for review.    Questions for provider review:    None                                                                                                                                    Nikkie Quiñonez MA

## 2020-02-14 NOTE — TELEPHONE ENCOUNTER
Called and left patient VM to return call to red team to schedule for BP check  Kurtis Escalera CMA on 2/14/2020 at 9:51 AM

## 2020-02-14 NOTE — TELEPHONE ENCOUNTER
patient should have refills at her pharmacy. Called and left her VM to call her pharmacy to refill her medication  Kurtis Escalera CMA on 2/4/2020 at 3:34 PM     back

## 2020-02-17 NOTE — TELEPHONE ENCOUNTER
2nd attempt. Called and left patient VM to return call to red team to schedule for BP check  Kurtis Escalera CMA on 2/17/2020 at 7:29 AM

## 2020-02-24 ENCOUNTER — HEALTH MAINTENANCE LETTER (OUTPATIENT)
Age: 60
End: 2020-02-24

## 2020-02-28 ENCOUNTER — TRANSFERRED RECORDS (OUTPATIENT)
Dept: HEALTH INFORMATION MANAGEMENT | Facility: CLINIC | Age: 60
End: 2020-02-28

## 2020-03-03 ENCOUNTER — ANCILLARY PROCEDURE (OUTPATIENT)
Dept: MAMMOGRAPHY | Facility: CLINIC | Age: 60
End: 2020-03-03
Attending: FAMILY MEDICINE
Payer: COMMERCIAL

## 2020-03-03 DIAGNOSIS — Z12.31 VISIT FOR SCREENING MAMMOGRAM: ICD-10-CM

## 2020-03-03 PROCEDURE — 77067 SCR MAMMO BI INCL CAD: CPT | Mod: TC

## 2020-03-24 ENCOUNTER — MYC MEDICAL ADVICE (OUTPATIENT)
Dept: ALLERGY | Facility: OTHER | Age: 60
End: 2020-03-24

## 2020-03-24 DIAGNOSIS — J45.30 MILD PERSISTENT ASTHMA WITHOUT COMPLICATION: Primary | ICD-10-CM

## 2020-03-24 RX ORDER — DEXAMETHASONE 4 MG/1
2 TABLET ORAL 2 TIMES DAILY
Qty: 3 INHALER | Refills: 3 | Status: SHIPPED | OUTPATIENT
Start: 2020-03-24

## 2020-03-24 NOTE — TELEPHONE ENCOUNTER
Patient is requesting script for Flovent to be sent as Asmanex is no longer covered. Please send script if appropriate. Patient would also like 90 day supply as her insurance is changed April 1.     Tamie Covington RN

## 2020-03-25 NOTE — TELEPHONE ENCOUNTER
RN left message for patient to return call to [phone].  If patient returns call, please assist her in scheduling a return telephone encounter.    Anastasiia Vu RN

## 2020-03-31 ENCOUNTER — VIRTUAL VISIT (OUTPATIENT)
Dept: ALLERGY | Facility: OTHER | Age: 60
End: 2020-03-31
Payer: COMMERCIAL

## 2020-03-31 VITALS — BODY MASS INDEX: 39.87 KG/M2 | HEIGHT: 66 IN

## 2020-03-31 DIAGNOSIS — J30.1 SEASONAL ALLERGIC RHINITIS DUE TO POLLEN: ICD-10-CM

## 2020-03-31 DIAGNOSIS — J30.81 ALLERGIC RHINITIS DUE TO ANIMAL DANDER: Primary | ICD-10-CM

## 2020-03-31 DIAGNOSIS — J45.30 MILD PERSISTENT ASTHMA WITHOUT COMPLICATION: ICD-10-CM

## 2020-03-31 PROCEDURE — 99212 OFFICE O/P EST SF 10 MIN: CPT | Mod: TEL | Performed by: ALLERGY & IMMUNOLOGY

## 2020-03-31 NOTE — ASSESSMENT & PLAN NOTE
Asthma is currently well controlled. She remains on Asmanex 220  g 1 puff inhaled daily.  Asthma triggers remain perfumes, dogs, dust and smoke. History of rheumatoid arthritis. When tried to taper in the past she was increasingly symptomatic. Follows with rheumatology Dr. Capellan.           - An asthma action plan was provided and discussed with patient and family.   - Xopenex 2-4 puffs inhaled (use a spacer unless using a Proair Respiclick device) every 4 hours as needed for chest tightness, wheezing, shortness of breath and/or coughing.   - Avoid asthma triggers.  - Flovent 110mcg 2 puffs daily.

## 2020-03-31 NOTE — ASSESSMENT & PLAN NOTE
Was on allergen immunotherapy. Stopped in 2016. No worsening since stopping immunotherapy.  Allergies well controlled.        - Zyrtec daily as needed for allergy symptoms.

## 2020-03-31 NOTE — PATIENT INSTRUCTIONS
Allergy Staff Appt Hours Shot Hours Locations    Physician     Mike Fox DO       Support Staff     ELBA Bourne, ADOLPH  Tuesday:        [place] 7-4:20     Wednesday:        [place]: 7-5 Thursday:                    [place] 7-6:40     Friday:  [place]  7-2:40   [place]        Thursday: 1-5:50        Friday: 7-10:50     [place]        Tuesday: 7- 3:20        Wednesday: 7-4:20     [place] Monday: 7-4:20 Tuesday: 1-6:20         [facility]  [address] [place] [address]  Appt Line: [phone]  Allergy RN:  [phone]    [facility]  [address] [place] [address]  Appt Line: [phone]  Allergy RN:  [phone]       Important Scheduling Information  Aspirin Desensitization: Appt will last 2 clinic days. Please call the Allergy RN line for your clinic to schedule. Discontinue antihistamines 7 days prior to the appointment.     Food Challenges: Appt will last 3-4 hours. Please call the Allergy RN line for your clinic to schedule. Discontinue antihistamines 7 days prior to the appointment.     Penicillin Testing: Appt will last 2-3 hours. Please call the Allergy RN line for your clinic to schedule. Discontinue antihistamines 7 days prior to the appointment.     Skin Testing: Appt will about 40 minutes. Call the appointment line for your clinic to schedule. Discontinue antihistamines 7 days prior to the appointment.     Venom Testing: Appt will last 2-3 hours. Please call the Allergy RN line for your clinic to schedule. Discontinue antihistamines 7 days prior to the appointment.     Thank you for trusting us with your Allergy, Asthma, and Immunology care. Please feel free to contact us with any questions or concerns you may have.      - Flovent 110mcg 2 puffs daily.   - Albuterol 2-4 puffs inhaled (use a spacer unless using a Proair Respiclick device) every 4 hours as needed for chest tightness, wheezing, shortness of breath and/or coughing.

## 2020-03-31 NOTE — PROGRESS NOTES
"Jorge Abarca is a 59 year old female who is being evaluated via a billable telephone visit.      The patient has been notified of following:     \"This telephone visit will be conducted via a call between you and your physician/provider. We have found that certain health care needs can be provided without the need for a physical exam.  This service lets us provide the care you need with a short phone conversation.  If a prescription is necessary, we can send it directly to your pharmacy.  If lab work is needed, we can place an order for that and you can then stop by our lab to have the test done at a later time.     If during the course of the call the physician/provider feels a telephone visit is not appropriate, you will not be charged for this service.\"   Consent has been obtained for this service by 1 care team member: yes.       Asthma has been controlled. No use of rescue inhaler. Using Asmanex 220mcg 1 puff daily. No coughing, shortness of breath, wheezing, and chest tightness. Now switched to Flovent 2 puffs daily. Allergies have been well controlled.            ACT Total Scores 11/4/2019   ACT TOTAL SCORE -   ASTHMA ER VISITS -   ASTHMA HOSPITALIZATIONS -   ACT TOTAL SCORE (Goal Greater than or Equal to 20) 25   In the past 12 months, how many times did you visit the emergency room for your asthma without being admitted to the hospital? 0   In the past 12 months, how many times were you hospitalized overnight because of your asthma? 0           I have reviewed and updated the patient's Past Medical History, Social History, Family History and Medication List.    ALLERGIES  Aleve [naproxen sodium]; Hydralazine; Nexium [esomeprazole magnesium trihydrate]; Shellfish allergy; Sulfa drugs; Amlodipine besylate; Hctz; and Lisinopril    ASSESSMENT/PLAN:  Problem List Items Addressed This Visit        Respiratory    Allergic rhinitis due to animal dander - Primary    Mild persistent asthma without complication     " Asthma is currently well controlled. She remains on Asmanex 220  g 1 puff inhaled daily.  Asthma triggers remain perfumes, dogs, dust and smoke. History of rheumatoid arthritis. When tried to taper in the past she was increasingly symptomatic. Follows with rheumatology Dr. Capellan.           - An asthma action plan was provided and discussed with patient and family.   - Xopenex 2-4 puffs inhaled (use a spacer unless using a Proair Respiclick device) every 4 hours as needed for chest tightness, wheezing, shortness of breath and/or coughing.   - Avoid asthma triggers.  - Flovent 110mcg 2 puffs daily.             Allergic rhinitis due to pollen     Was on allergen immunotherapy. Stopped in 2016. No worsening since stopping immunotherapy.  Allergies well controlled.        - Zyrtec daily as needed for allergy symptoms.                Chart documentation with Dragon Voice recognition Software. Although reviewed after completion, some words and grammatical errors may remain.    I have reviewed the note as documented above.  This accurately captures the substance of my conversation with the patient.    Phone call contact time  Call Started at 1412  Call Ended at  1420    Mike Fox DO FAAAAI  Allergy/Immunology  [place], [place]

## 2020-04-20 ENCOUNTER — MYC REFILL (OUTPATIENT)
Dept: FAMILY MEDICINE | Facility: CLINIC | Age: 60
End: 2020-04-20

## 2020-04-20 DIAGNOSIS — E11.9 TYPE 2 DIABETES MELLITUS WITHOUT COMPLICATION, WITHOUT LONG-TERM CURRENT USE OF INSULIN (H): ICD-10-CM

## 2020-04-20 DIAGNOSIS — E78.5 HYPERLIPIDEMIA LDL GOAL <100: ICD-10-CM

## 2020-04-20 DIAGNOSIS — M80.00XA AGE-RELATED OSTEOPOROSIS WITH CURRENT PATHOLOGICAL FRACTURE, INITIAL ENCOUNTER: ICD-10-CM

## 2020-04-20 DIAGNOSIS — E66.01 MORBID OBESITY DUE TO EXCESS CALORIES (H): Primary | ICD-10-CM

## 2020-04-20 DIAGNOSIS — I10 ESSENTIAL HYPERTENSION WITH GOAL BLOOD PRESSURE LESS THAN 140/90: ICD-10-CM

## 2020-04-21 ENCOUNTER — ALLIED HEALTH/NURSE VISIT (OUTPATIENT)
Dept: NURSING | Facility: CLINIC | Age: 60
End: 2020-04-21
Payer: COMMERCIAL

## 2020-04-21 VITALS — SYSTOLIC BLOOD PRESSURE: 138 MMHG | DIASTOLIC BLOOD PRESSURE: 82 MMHG

## 2020-04-21 DIAGNOSIS — E66.01 MORBID OBESITY DUE TO EXCESS CALORIES (H): ICD-10-CM

## 2020-04-21 DIAGNOSIS — I10 HYPERTENSION GOAL BP (BLOOD PRESSURE) < 140/90: Primary | ICD-10-CM

## 2020-04-21 DIAGNOSIS — Z79.899 HIGH RISK MEDICATIONS (NOT ANTICOAGULANTS) LONG-TERM USE: ICD-10-CM

## 2020-04-21 DIAGNOSIS — E11.9 TYPE 2 DIABETES MELLITUS WITHOUT COMPLICATION, WITHOUT LONG-TERM CURRENT USE OF INSULIN (H): ICD-10-CM

## 2020-04-21 DIAGNOSIS — M06.00 SERONEGATIVE RHEUMATOID ARTHRITIS (H): ICD-10-CM

## 2020-04-21 DIAGNOSIS — I10 ESSENTIAL HYPERTENSION WITH GOAL BLOOD PRESSURE LESS THAN 140/90: ICD-10-CM

## 2020-04-21 DIAGNOSIS — E78.5 HYPERLIPIDEMIA LDL GOAL <100: ICD-10-CM

## 2020-04-21 LAB
ALBUMIN SERPL-MCNC: 3.8 G/DL (ref 3.4–5)
ALP SERPL-CCNC: 78 U/L (ref 40–150)
ALT SERPL W P-5'-P-CCNC: 70 U/L (ref 0–50)
ANION GAP SERPL CALCULATED.3IONS-SCNC: 4 MMOL/L (ref 3–14)
AST SERPL W P-5'-P-CCNC: 48 U/L (ref 0–45)
BASOPHILS # BLD AUTO: 0 10E9/L (ref 0–0.2)
BASOPHILS NFR BLD AUTO: 0.5 %
BILIRUB DIRECT SERPL-MCNC: 0.1 MG/DL (ref 0–0.2)
BILIRUB SERPL-MCNC: 0.4 MG/DL (ref 0.2–1.3)
BUN SERPL-MCNC: 14 MG/DL (ref 7–30)
CALCIUM SERPL-MCNC: 9.3 MG/DL (ref 8.5–10.1)
CHLORIDE SERPL-SCNC: 104 MMOL/L (ref 94–109)
CHOLEST SERPL-MCNC: 123 MG/DL
CO2 SERPL-SCNC: 30 MMOL/L (ref 20–32)
CREAT SERPL-MCNC: 0.89 MG/DL (ref 0.52–1.04)
DIFFERENTIAL METHOD BLD: ABNORMAL
EOSINOPHIL # BLD AUTO: 0.5 10E9/L (ref 0–0.7)
EOSINOPHIL NFR BLD AUTO: 8.9 %
ERYTHROCYTE [DISTWIDTH] IN BLOOD BY AUTOMATED COUNT: 18.8 % (ref 10–15)
GFR SERPL CREATININE-BSD FRML MDRD: 70 ML/MIN/{1.73_M2}
GLUCOSE SERPL-MCNC: 251 MG/DL (ref 70–99)
HBA1C MFR BLD: 7.2 % (ref 0–5.6)
HCT VFR BLD AUTO: 37.7 % (ref 35–47)
HDLC SERPL-MCNC: 66 MG/DL
HGB BLD-MCNC: 11.9 G/DL (ref 11.7–15.7)
LDLC SERPL CALC-MCNC: 35 MG/DL
LYMPHOCYTES # BLD AUTO: 1.1 10E9/L (ref 0.8–5.3)
LYMPHOCYTES NFR BLD AUTO: 19.9 %
MCH RBC QN AUTO: 27.3 PG (ref 26.5–33)
MCHC RBC AUTO-ENTMCNC: 31.6 G/DL (ref 31.5–36.5)
MCV RBC AUTO: 87 FL (ref 78–100)
MONOCYTES # BLD AUTO: 0.6 10E9/L (ref 0–1.3)
MONOCYTES NFR BLD AUTO: 10.1 %
NEUTROPHILS # BLD AUTO: 3.4 10E9/L (ref 1.6–8.3)
NEUTROPHILS NFR BLD AUTO: 60.6 %
NONHDLC SERPL-MCNC: 57 MG/DL
PLATELET # BLD AUTO: 305 10E9/L (ref 150–450)
POTASSIUM SERPL-SCNC: 4.1 MMOL/L (ref 3.4–5.3)
PROT SERPL-MCNC: 7.3 G/DL (ref 6.8–8.8)
RBC # BLD AUTO: 4.36 10E12/L (ref 3.8–5.2)
SODIUM SERPL-SCNC: 138 MMOL/L (ref 133–144)
TRIGL SERPL-MCNC: 111 MG/DL
WBC # BLD AUTO: 5.6 10E9/L (ref 4–11)

## 2020-04-21 PROCEDURE — 36415 COLL VENOUS BLD VENIPUNCTURE: CPT | Performed by: FAMILY MEDICINE

## 2020-04-21 PROCEDURE — 80076 HEPATIC FUNCTION PANEL: CPT | Performed by: FAMILY MEDICINE

## 2020-04-21 PROCEDURE — 83036 HEMOGLOBIN GLYCOSYLATED A1C: CPT | Performed by: FAMILY MEDICINE

## 2020-04-21 PROCEDURE — 85025 COMPLETE CBC W/AUTO DIFF WBC: CPT | Performed by: FAMILY MEDICINE

## 2020-04-21 PROCEDURE — 80048 BASIC METABOLIC PNL TOTAL CA: CPT | Performed by: FAMILY MEDICINE

## 2020-04-21 PROCEDURE — 80061 LIPID PANEL: CPT | Performed by: FAMILY MEDICINE

## 2020-04-21 RX ORDER — ALENDRONATE SODIUM 70 MG/1
70 TABLET ORAL
Qty: 12 TABLET | Refills: 0 | Status: SHIPPED | OUTPATIENT
Start: 2020-04-21

## 2020-04-21 RX ORDER — LOSARTAN POTASSIUM 100 MG/1
TABLET ORAL
Qty: 30 TABLET | Refills: 0 | Status: SHIPPED | OUTPATIENT
Start: 2020-04-21

## 2020-04-21 RX ORDER — METOPROLOL SUCCINATE 50 MG/1
50 TABLET, EXTENDED RELEASE ORAL DAILY
Qty: 30 TABLET | Refills: 0 | Status: SHIPPED | OUTPATIENT
Start: 2020-04-21

## 2020-04-21 RX ORDER — SIMVASTATIN 10 MG
10 TABLET ORAL AT BEDTIME
Qty: 90 TABLET | Refills: 0 | Status: SHIPPED | OUTPATIENT
Start: 2020-04-21

## 2020-04-21 NOTE — RESULT ENCOUNTER NOTE
Oskar Patel,    Please schedule a Video or phone visit to discuss your diabetes.     Your final test results are pending.  Please check your chart again within 2 - 3 days.    Paula Alarcon MD

## 2020-04-21 NOTE — TELEPHONE ENCOUNTER
"Routing refill request to provider for review/approval because:  Failed protocol - see below    Requested Prescriptions   Pending Prescriptions Disp Refills     losartan (COZAAR) 100 MG tablet 90 tablet 3     Sig: TAKE 1 TABLET (100 MG) BY MOUTH DAILY.       Angiotensin-II Receptors Failed - 4/20/2020 10:09 AM        Failed - Last blood pressure under 140/90 in past 12 months     BP Readings from Last 3 Encounters:   02/13/20 (!) 164/92   11/18/19 130/82   11/04/19 (!) 180/104                 Failed - Normal serum potassium on file in past 12 months     Recent Labs   Lab Test 08/07/18  1615   POTASSIUM 4.4                    Passed - Recent (12 mo) or future (30 days) visit within the authorizing provider's specialty     Patient has had an office visit with the authorizing provider or a provider within the authorizing providers department within the previous 12 mos or has a future within next 30 days. See \"Patient Info\" tab in inbasket, or \"Choose Columns\" in Meds & Orders section of the refill encounter.              Passed - Medication is active on med list        Passed - Patient is age 18 or older        Passed - No active pregnancy on record        Passed - Normal serum creatinine on file in past 12 months     Recent Labs   Lab Test 02/07/20  1055   CR 0.83       Ok to refill medication if creatinine is low          Passed - No positive pregnancy test in past 12 months         Signed Prescriptions Disp Refills    metFORMIN (GLUCOPHAGE) 1000 MG tablet 180 tablet 0     Sig: TAKE 1 TABLET TWICE DAILY  WITH MEALs       Biguanide Agents Passed - 4/20/2020 10:09 AM        Passed - Patient is age 10 or older        Passed - Patient has documented A1c within the specified period of time.     If HgbA1C is 8 or greater, it needs to be on file within the past 3 months.  If less than 8, must be on file within the past 6 months.     Recent Labs   Lab Test 11/04/19  1332   A1C 6.9*             Passed - Patient's CR is " "NOT>1.4 OR Patient's EGFR is NOT<45 within past 12 mos.     Recent Labs   Lab Test 02/07/20  1055   GFRESTIMATED 76   GFRESTBLACK 88       Recent Labs   Lab Test 02/07/20  1055   CR 0.83             Passed - Patient does NOT have a diagnosis of CHF.        Passed - Medication is active on med list        Passed - Patient is not pregnant        Passed - Patient has not had a positive pregnancy test within the past 12 mos.         Passed - Recent (6 mo) or future (30 days) visit within the authorizing provider's specialty     Patient had office visit in the last 6 months or has a visit in the next 30 days with authorizing provider or within the authorizing provider's specialty.  See \"Patient Info\" tab in inbasket, or \"Choose Columns\" in Meds & Orders section of the refill encounter.               Mary Grajeda RN  "

## 2020-04-21 NOTE — TELEPHONE ENCOUNTER
Called and left patient VM to return call to purple team to schedule for a telephone visit  Kurtis Escalera CMA on 4/21/2020 at 8:13 AM

## 2020-04-21 NOTE — RESULT ENCOUNTER NOTE
Oskar Patel,     Please schedule a Video or phone visit to discuss your diabetes.     Your kidney and cholesterol tests are normal.     Paula Alarcon MD

## 2020-04-21 NOTE — TELEPHONE ENCOUNTER
Please call patient to schedule ancillary blood pressure check and have my labs drawn too at lab only appointment as already scheduled today     Then, follow-up by Video Visit for hypertension and diabetes     Paula Alarcon MD    Signed Prescriptions:                        Disp   Refills    metoprolol succinate ER (TOPROL-XL) 50 MG *30 tab*0        Sig: Take 1 tablet (50 mg) by mouth daily  Authorizing Provider: PAULA ALARCON    alendronate (FOSAMAX) 70 MG tablet         12 tab*0        Sig: Take 1 tablet (70 mg) by mouth every 7 days  Authorizing Provider: PAULA ALARCON  Ordering User: MARTA CALDERÓN    simvastatin (ZOCOR) 10 MG tablet           90 tab*0        Sig: Take 1 tablet (10 mg) by mouth At Bedtime  Authorizing Provider: PAULA ALARCON

## 2020-04-21 NOTE — TELEPHONE ENCOUNTER
"Routing refill request to provider for review/approval because:  Failed protocol - see below    Requested Prescriptions   Pending Prescriptions Disp Refills     metoprolol succinate ER (TOPROL-XL) 50 MG 24 hr tablet 90 tablet 3     Sig: Take 1 tablet (50 mg) by mouth daily       Beta-Blockers Protocol Failed - 4/20/2020  2:08 PM        Failed - Blood pressure under 140/90 in past 12 months     BP Readings from Last 3 Encounters:   02/13/20 (!) 164/92   11/18/19 130/82   11/04/19 (!) 180/104                 Passed - Patient is age 6 or older        Passed - Recent (12 mo) or future (30 days) visit within the authorizing provider's specialty     Patient has had an office visit with the authorizing provider or a provider within the authorizing providers department within the previous 12 mos or has a future within next 30 days. See \"Patient Info\" tab in inbasket, or \"Choose Columns\" in Meds & Orders section of the refill encounter.              Passed - Medication is active on med list           simvastatin (ZOCOR) 10 MG tablet 90 tablet 0     Sig: Take 1 tablet (10 mg) by mouth At Bedtime       Statins Protocol Failed - 4/20/2020  2:08 PM        Failed - LDL on file in past 12 months     Recent Labs   Lab Test 02/15/19  1001   LDL 68             Passed - No abnormal creatine kinase in past 12 months     No lab results found.             Passed - Recent (12 mo) or future (30 days) visit within the authorizing provider's specialty     Patient has had an office visit with the authorizing provider or a provider within the authorizing providers department within the previous 12 mos or has a future within next 30 days. See \"Patient Info\" tab in inbasket, or \"Choose Columns\" in Meds & Orders section of the refill encounter.              Passed - Medication is active on med list        Passed - Patient is age 18 or older        Passed - No active pregnancy on record        Passed - No positive pregnancy test in past 12 months " "        Signed Prescriptions Disp Refills    alendronate (FOSAMAX) 70 MG tablet 12 tablet 0     Sig: Take 1 tablet (70 mg) by mouth every 7 days       Bisphosphonates Passed - 4/20/2020  2:08 PM        Passed - Recent (12 mo) or future (30 days) visit within the authorizing provider's specialty     Patient has had an office visit with the authorizing provider or a provider within the authorizing providers department within the previous 12 mos or has a future within next 30 days. See \"Patient Info\" tab in inbasket, or \"Choose Columns\" in Meds & Orders section of the refill encounter.              Passed - Dexa on file within past 2 years     Please review last Dexa result.           Passed - Medication is active on med list        Passed - Patient is age 18 or older        Passed - Normal serum creatinine on file within past 12 months     Recent Labs   Lab Test 02/07/20  1055   CR 0.83       Ok to refill medication if creatinine is low             Mary Grajeda RN  "

## 2020-12-13 ENCOUNTER — HEALTH MAINTENANCE LETTER (OUTPATIENT)
Age: 60
End: 2020-12-13

## 2021-04-17 ENCOUNTER — HEALTH MAINTENANCE LETTER (OUTPATIENT)
Age: 61
End: 2021-04-17

## 2021-08-01 ENCOUNTER — HEALTH MAINTENANCE LETTER (OUTPATIENT)
Age: 61
End: 2021-08-01

## 2021-09-26 ENCOUNTER — HEALTH MAINTENANCE LETTER (OUTPATIENT)
Age: 61
End: 2021-09-26

## 2022-02-17 PROBLEM — S32.010A CLOSED COMPRESSION FRACTURE OF L1 VERTEBRA, INITIAL ENCOUNTER (H): Status: RESOLVED | Noted: 2018-06-13 | Resolved: 2018-08-07

## 2022-03-13 ENCOUNTER — HEALTH MAINTENANCE LETTER (OUTPATIENT)
Age: 62
End: 2022-03-13

## 2022-05-08 ENCOUNTER — HEALTH MAINTENANCE LETTER (OUTPATIENT)
Age: 62
End: 2022-05-08

## 2023-03-14 LAB
ALANINE AMINOTRANSFERASE (SGPT) (U/L) IN SER/PLAS: 30 U/L (ref 7–45)
ALBUMIN (G/DL) IN SER/PLAS: 4.2 G/DL (ref 3.4–5)
ALKALINE PHOSPHATASE (U/L) IN SER/PLAS: 52 U/L (ref 33–136)
ANION GAP IN SER/PLAS: 12 MMOL/L (ref 10–20)
ASPARTATE AMINOTRANSFERASE (SGOT) (U/L) IN SER/PLAS: 36 U/L (ref 9–39)
BASOPHILS (10*3/UL) IN BLOOD BY AUTOMATED COUNT: 0.05 X10E9/L (ref 0–0.1)
BASOPHILS/100 LEUKOCYTES IN BLOOD BY AUTOMATED COUNT: 0.7 % (ref 0–2)
BILIRUBIN TOTAL (MG/DL) IN SER/PLAS: 0.5 MG/DL (ref 0–1.2)
CALCIUM (MG/DL) IN SER/PLAS: 10.2 MG/DL (ref 8.6–10.3)
CARBON DIOXIDE, TOTAL (MMOL/L) IN SER/PLAS: 29 MMOL/L (ref 21–32)
CHLORIDE (MMOL/L) IN SER/PLAS: 101 MMOL/L (ref 98–107)
CREATININE (MG/DL) IN SER/PLAS: 0.92 MG/DL (ref 0.5–1.05)
EOSINOPHILS (10*3/UL) IN BLOOD BY AUTOMATED COUNT: 0.1 X10E9/L (ref 0–0.7)
EOSINOPHILS/100 LEUKOCYTES IN BLOOD BY AUTOMATED COUNT: 1.4 % (ref 0–6)
ERYTHROCYTE DISTRIBUTION WIDTH (RATIO) BY AUTOMATED COUNT: 19.2 % (ref 11.5–14.5)
ERYTHROCYTE MEAN CORPUSCULAR HEMOGLOBIN CONCENTRATION (G/DL) BY AUTOMATED: 31.9 G/DL (ref 32–36)
ERYTHROCYTE MEAN CORPUSCULAR VOLUME (FL) BY AUTOMATED COUNT: 84 FL (ref 80–100)
ERYTHROCYTES (10*6/UL) IN BLOOD BY AUTOMATED COUNT: 4.19 X10E12/L (ref 4–5.2)
FERRITIN (UG/LL) IN SER/PLAS: 32 UG/L (ref 8–150)
GFR FEMALE: 69 ML/MIN/1.73M2
GLUCOSE (MG/DL) IN SER/PLAS: 95 MG/DL (ref 74–99)
HEMATOCRIT (%) IN BLOOD BY AUTOMATED COUNT: 35.1 % (ref 36–46)
HEMOGLOBIN (G/DL) IN BLOOD: 11.2 G/DL (ref 12–16)
IMMATURE GRANULOCYTES/100 LEUKOCYTES IN BLOOD BY AUTOMATED COUNT: 0.5 % (ref 0–0.9)
IRON (UG/DL) IN SER/PLAS: 55 UG/DL (ref 35–150)
IRON BINDING CAPACITY (UG/DL) IN SER/PLAS: 362 UG/DL (ref 240–445)
IRON SATURATION (%) IN SER/PLAS: 15 % (ref 25–45)
LEUKOCYTES (10*3/UL) IN BLOOD BY AUTOMATED COUNT: 7.3 X10E9/L (ref 4.4–11.3)
LYMPHOCYTES (10*3/UL) IN BLOOD BY AUTOMATED COUNT: 1.41 X10E9/L (ref 1.2–4.8)
LYMPHOCYTES/100 LEUKOCYTES IN BLOOD BY AUTOMATED COUNT: 19.3 % (ref 13–44)
MONOCYTES (10*3/UL) IN BLOOD BY AUTOMATED COUNT: 0.57 X10E9/L (ref 0.1–1)
MONOCYTES/100 LEUKOCYTES IN BLOOD BY AUTOMATED COUNT: 7.8 % (ref 2–10)
NEUTROPHILS (10*3/UL) IN BLOOD BY AUTOMATED COUNT: 5.12 X10E9/L (ref 1.2–7.7)
NEUTROPHILS/100 LEUKOCYTES IN BLOOD BY AUTOMATED COUNT: 70.3 % (ref 40–80)
PLATELETS (10*3/UL) IN BLOOD AUTOMATED COUNT: 408 X10E9/L (ref 150–450)
POTASSIUM (MMOL/L) IN SER/PLAS: 4.4 MMOL/L (ref 3.5–5.3)
PROTEIN TOTAL: 7.4 G/DL (ref 6.4–8.2)
SODIUM (MMOL/L) IN SER/PLAS: 138 MMOL/L (ref 136–145)
UREA NITROGEN (MG/DL) IN SER/PLAS: 19 MG/DL (ref 6–23)

## 2023-04-23 ENCOUNTER — HEALTH MAINTENANCE LETTER (OUTPATIENT)
Age: 63
End: 2023-04-23

## 2023-05-09 ENCOUNTER — TELEPHONE (OUTPATIENT)
Dept: PRIMARY CARE | Facility: CLINIC | Age: 63
End: 2023-05-09

## 2023-05-09 NOTE — TELEPHONE ENCOUNTER
----- Message from Caroline Tovar DO sent at 5/9/2023 10:47 AM EDT -----  Your screening mammogram is normal. Continue with yearly screening mamms

## 2023-05-26 ENCOUNTER — OFFICE VISIT (OUTPATIENT)
Dept: PRIMARY CARE | Facility: CLINIC | Age: 63
End: 2023-05-26
Payer: COMMERCIAL

## 2023-05-26 VITALS
BODY MASS INDEX: 32.95 KG/M2 | HEIGHT: 66 IN | TEMPERATURE: 96.6 F | WEIGHT: 205 LBS | DIASTOLIC BLOOD PRESSURE: 88 MMHG | HEART RATE: 74 BPM | OXYGEN SATURATION: 98 % | RESPIRATION RATE: 14 BRPM | SYSTOLIC BLOOD PRESSURE: 120 MMHG

## 2023-05-26 DIAGNOSIS — K21.9 GASTROESOPHAGEAL REFLUX DISEASE WITHOUT ESOPHAGITIS: ICD-10-CM

## 2023-05-26 DIAGNOSIS — I10 PRIMARY HYPERTENSION: ICD-10-CM

## 2023-05-26 DIAGNOSIS — E11.9 TYPE 2 DIABETES MELLITUS WITHOUT COMPLICATION, WITHOUT LONG-TERM CURRENT USE OF INSULIN (MULTI): ICD-10-CM

## 2023-05-26 DIAGNOSIS — M06.9 RHEUMATOID ARTHRITIS INVOLVING MULTIPLE SITES, UNSPECIFIED WHETHER RHEUMATOID FACTOR PRESENT (MULTI): ICD-10-CM

## 2023-05-26 DIAGNOSIS — D64.9 ANEMIA, UNSPECIFIED TYPE: Primary | ICD-10-CM

## 2023-05-26 PROBLEM — H04.129 DRY EYE: Status: ACTIVE | Noted: 2023-05-26

## 2023-05-26 PROBLEM — J45.909 ASTHMA (HHS-HCC): Status: ACTIVE | Noted: 2023-05-26

## 2023-05-26 PROBLEM — J30.2 SEASONAL ALLERGIES: Status: ACTIVE | Noted: 2023-05-26

## 2023-05-26 PROBLEM — E53.8 VITAMIN B12 DEFICIENCY: Status: ACTIVE | Noted: 2023-05-26

## 2023-05-26 PROBLEM — E78.5 HLD (HYPERLIPIDEMIA): Status: ACTIVE | Noted: 2023-05-26

## 2023-05-26 PROBLEM — Z79.631 LONG TERM METHOTREXATE USER: Status: ACTIVE | Noted: 2023-05-26

## 2023-05-26 PROBLEM — N18.31 STAGE 3A CHRONIC KIDNEY DISEASE (MULTI): Status: ACTIVE | Noted: 2023-05-26

## 2023-05-26 PROBLEM — M81.0 OSTEOPOROSIS: Status: ACTIVE | Noted: 2023-05-26

## 2023-05-26 LAB — POC HEMOGLOBIN A1C: 6.6 % (ref 4.2–6.5)

## 2023-05-26 PROCEDURE — 3074F SYST BP LT 130 MM HG: CPT | Performed by: INTERNAL MEDICINE

## 2023-05-26 PROCEDURE — 83036 HEMOGLOBIN GLYCOSYLATED A1C: CPT | Performed by: INTERNAL MEDICINE

## 2023-05-26 PROCEDURE — 99214 OFFICE O/P EST MOD 30 MIN: CPT | Performed by: INTERNAL MEDICINE

## 2023-05-26 PROCEDURE — 3079F DIAST BP 80-89 MM HG: CPT | Performed by: INTERNAL MEDICINE

## 2023-05-26 PROCEDURE — 4010F ACE/ARB THERAPY RXD/TAKEN: CPT | Performed by: INTERNAL MEDICINE

## 2023-05-26 PROCEDURE — 1036F TOBACCO NON-USER: CPT | Performed by: INTERNAL MEDICINE

## 2023-05-26 RX ORDER — MOMETASONE FUROATE 220 UG/1
INHALANT RESPIRATORY (INHALATION)
COMMUNITY
Start: 2020-10-28 | End: 2023-09-29 | Stop reason: SDUPTHER

## 2023-05-26 RX ORDER — CHOLECALCIFEROL (VITAMIN D3) 25 MCG
TABLET ORAL
COMMUNITY
Start: 2020-07-27

## 2023-05-26 RX ORDER — FOLIC ACID 1 MG/1
TABLET ORAL
COMMUNITY
Start: 2022-08-03 | End: 2023-11-01 | Stop reason: SDUPTHER

## 2023-05-26 RX ORDER — IBUPROFEN 100 MG/5ML
SUSPENSION, ORAL (FINAL DOSE FORM) ORAL
COMMUNITY
Start: 2020-07-27

## 2023-05-26 RX ORDER — HYDROXYCHLOROQUINE SULFATE 200 MG/1
200 TABLET, FILM COATED ORAL DAILY
COMMUNITY
Start: 2020-04-23 | End: 2023-11-01 | Stop reason: SDUPTHER

## 2023-05-26 RX ORDER — CETIRIZINE HYDROCHLORIDE 10 MG/1
10 TABLET ORAL DAILY
COMMUNITY
Start: 2020-07-27

## 2023-05-26 RX ORDER — METHOTREXATE 2.5 MG/1
8 TABLET ORAL
COMMUNITY
Start: 2020-04-23 | End: 2023-11-01 | Stop reason: SDUPTHER

## 2023-05-26 RX ORDER — METOPROLOL SUCCINATE 50 MG/1
50 TABLET, EXTENDED RELEASE ORAL DAILY
COMMUNITY
Start: 2020-04-21 | End: 2023-09-29 | Stop reason: SDUPTHER

## 2023-05-26 RX ORDER — ADHESIVE BANDAGE 7/8"
BANDAGE TOPICAL
COMMUNITY
Start: 2020-07-27

## 2023-05-26 RX ORDER — HYDROXYZINE HYDROCHLORIDE 25 MG/1
25 TABLET, FILM COATED ORAL ONCE
COMMUNITY
Start: 2020-07-27 | End: 2023-09-29 | Stop reason: SDUPTHER

## 2023-05-26 RX ORDER — LEVALBUTEROL TARTRATE 45 UG/1
1 AEROSOL, METERED ORAL EVERY 4 HOURS PRN
COMMUNITY
Start: 2020-07-27

## 2023-05-26 RX ORDER — GINSENG 100 MG
CAPSULE ORAL
COMMUNITY
Start: 2020-07-27

## 2023-05-26 RX ORDER — NAPROXEN SODIUM 220 MG/1
TABLET, FILM COATED ORAL
COMMUNITY
Start: 2020-07-27

## 2023-05-26 RX ORDER — ACETAMINOPHEN, DEXTROMETHORPHAN HBR, DOXYLAMINE SUCCINATE, PHENYLEPHRINE HCL 650; 20; 12.5; 1 MG/30ML; MG/30ML; MG/30ML; MG/30ML
1 SOLUTION ORAL DAILY
COMMUNITY
Start: 2021-05-13

## 2023-05-26 RX ORDER — SIMVASTATIN 10 MG/1
10 TABLET, FILM COATED ORAL NIGHTLY
COMMUNITY
Start: 2020-04-21 | End: 2023-09-29 | Stop reason: SDUPTHER

## 2023-05-26 RX ORDER — LOSARTAN POTASSIUM 100 MG/1
100 TABLET ORAL DAILY
COMMUNITY
Start: 2020-04-21 | End: 2023-09-29 | Stop reason: SDUPTHER

## 2023-05-26 RX ORDER — METFORMIN HYDROCHLORIDE 1000 MG/1
1000 TABLET ORAL
COMMUNITY
Start: 2020-04-21 | End: 2023-09-29 | Stop reason: SDUPTHER

## 2023-05-26 RX ORDER — CALC/MAG/B COMPLEX/D3/HERB 61
15 TABLET ORAL
COMMUNITY
Start: 2020-07-27

## 2023-05-26 NOTE — PATIENT INSTRUCTIONS
follow up in 3 months. Recommend yearly eye exams and self foot exams. call if any problems or questions.   Egd for chornic gerd and anemia

## 2023-05-26 NOTE — PROGRESS NOTES
"Subjective    Parish Muñoz is a 62 y.o. female who presents for Diabetes.  HPI  DM follow up   Does not check blood glucose  Last A1c 6.7  She has had long standing heart burn.     Review of Systems   All other systems reviewed and are negative.        Objective     /88 (BP Location: Left arm, Patient Position: Sitting, BP Cuff Size: Adult)   Pulse 74   Temp 35.9 °C (96.6 °F) (Skin)   Resp 14   Ht 1.676 m (5' 6\")   Wt 93 kg (205 lb)   SpO2 98%   BMI 33.09 kg/m²    Physical Exam  Vitals reviewed.   Constitutional:       General: She is not in acute distress.     Appearance: Normal appearance.   Cardiovascular:      Rate and Rhythm: Normal rate and regular rhythm.      Pulses: Normal pulses.      Heart sounds: Normal heart sounds.   Pulmonary:      Effort: Pulmonary effort is normal.      Breath sounds: Normal breath sounds.   Abdominal:      Tenderness: There is no abdominal tenderness.   Musculoskeletal:         General: No swelling.   Skin:     General: Skin is warm and dry.   Neurological:      Mental Status: She is alert.       Health Maintenance Due   Topic Date Due    Yearly Adult Physical  Never done    Medicare Initial Physical (IPPE)  Never done    HIV Screening  Never done    MMR Vaccines (1 of 1 - Standard series) Never done    Pneumococcal Vaccine: Pediatrics (0 to 5 Years) and At-Risk Patients (6 to 64 Years) (1 - PCV) Never done    Pneumococcal Vaccine: 65+ Years (1 - PCV) Never done    Diabetes: Foot Exam  Never done    Diabetes: Retinopathy Screening  Never done    Hepatitis C Screening  Never done    Cervical Cancer Screening  Never done    DTaP/Tdap/Td Vaccines (1 - Tdap) Never done    Diabetes: Urine Protein Screening  09/20/2022    COVID-19 Vaccine (4 - Booster for Pfizer series) 01/05/2023    Lipid Panel  01/12/2023          Assessment/Plan   Problem List Items Addressed This Visit          Circulatory    Hypertension       Endocrine/Metabolic    Diabetes (CMS/HCC)    Relevant " Orders    POCT glycosylated hemoglobin (Hb A1C) manually resulted (Completed)       Hematologic    Anemia - Primary    Relevant Orders    EGD       Other    Rheumatoid arthritis (CMS/HCC)     Other Visit Diagnoses       Gastroesophageal reflux disease without esophagitis            Stable based on symptoms. Continue established treatment plan and follow up at least yearly  She has longstanding GERD and mild chronic anemia. Will refer for EGD

## 2023-06-02 ENCOUNTER — HEALTH MAINTENANCE LETTER (OUTPATIENT)
Age: 63
End: 2023-06-02

## 2023-07-11 LAB
ALANINE AMINOTRANSFERASE (SGPT) (U/L) IN SER/PLAS: 34 U/L (ref 7–45)
ALBUMIN (G/DL) IN SER/PLAS: 4.4 G/DL (ref 3.4–5)
ALKALINE PHOSPHATASE (U/L) IN SER/PLAS: 53 U/L (ref 33–136)
ASPARTATE AMINOTRANSFERASE (SGOT) (U/L) IN SER/PLAS: 43 U/L (ref 9–39)
BASOPHILS (10*3/UL) IN BLOOD BY AUTOMATED COUNT: 0.03 X10E9/L (ref 0–0.1)
BASOPHILS/100 LEUKOCYTES IN BLOOD BY AUTOMATED COUNT: 0.6 % (ref 0–2)
BILIRUBIN DIRECT (MG/DL) IN SER/PLAS: 0.1 MG/DL (ref 0–0.3)
BILIRUBIN TOTAL (MG/DL) IN SER/PLAS: 0.6 MG/DL (ref 0–1.2)
CREATININE (MG/DL) IN SER/PLAS: 1 MG/DL (ref 0.5–1.05)
EOSINOPHILS (10*3/UL) IN BLOOD BY AUTOMATED COUNT: 0.34 X10E9/L (ref 0–0.7)
EOSINOPHILS/100 LEUKOCYTES IN BLOOD BY AUTOMATED COUNT: 6.6 % (ref 0–6)
ERYTHROCYTE DISTRIBUTION WIDTH (RATIO) BY AUTOMATED COUNT: 19.2 % (ref 11.5–14.5)
ERYTHROCYTE MEAN CORPUSCULAR HEMOGLOBIN CONCENTRATION (G/DL) BY AUTOMATED: 31.4 G/DL (ref 32–36)
ERYTHROCYTE MEAN CORPUSCULAR VOLUME (FL) BY AUTOMATED COUNT: 85 FL (ref 80–100)
ERYTHROCYTES (10*6/UL) IN BLOOD BY AUTOMATED COUNT: 3.98 X10E12/L (ref 4–5.2)
GFR FEMALE: 63 ML/MIN/1.73M2
HEMATOCRIT (%) IN BLOOD BY AUTOMATED COUNT: 33.8 % (ref 36–46)
HEMOGLOBIN (G/DL) IN BLOOD: 10.6 G/DL (ref 12–16)
IMMATURE GRANULOCYTES/100 LEUKOCYTES IN BLOOD BY AUTOMATED COUNT: 0.2 % (ref 0–0.9)
LEUKOCYTES (10*3/UL) IN BLOOD BY AUTOMATED COUNT: 5.2 X10E9/L (ref 4.4–11.3)
LYMPHOCYTES (10*3/UL) IN BLOOD BY AUTOMATED COUNT: 1.06 X10E9/L (ref 1.2–4.8)
LYMPHOCYTES/100 LEUKOCYTES IN BLOOD BY AUTOMATED COUNT: 20.5 % (ref 13–44)
MONOCYTES (10*3/UL) IN BLOOD BY AUTOMATED COUNT: 0.46 X10E9/L (ref 0.1–1)
MONOCYTES/100 LEUKOCYTES IN BLOOD BY AUTOMATED COUNT: 8.9 % (ref 2–10)
NEUTROPHILS (10*3/UL) IN BLOOD BY AUTOMATED COUNT: 3.27 X10E9/L (ref 1.2–7.7)
NEUTROPHILS/100 LEUKOCYTES IN BLOOD BY AUTOMATED COUNT: 63.2 % (ref 40–80)
PLATELETS (10*3/UL) IN BLOOD AUTOMATED COUNT: 322 X10E9/L (ref 150–450)
PROTEIN TOTAL: 7 G/DL (ref 6.4–8.2)
UREA NITROGEN (MG/DL) IN SER/PLAS: 18 MG/DL (ref 6–23)

## 2023-09-29 ENCOUNTER — OFFICE VISIT (OUTPATIENT)
Dept: PRIMARY CARE | Facility: CLINIC | Age: 63
End: 2023-09-29
Payer: MEDICARE

## 2023-09-29 VITALS
SYSTOLIC BLOOD PRESSURE: 118 MMHG | RESPIRATION RATE: 14 BRPM | TEMPERATURE: 97.2 F | WEIGHT: 197 LBS | BODY MASS INDEX: 31.66 KG/M2 | HEIGHT: 66 IN | DIASTOLIC BLOOD PRESSURE: 80 MMHG | HEART RATE: 78 BPM | OXYGEN SATURATION: 98 %

## 2023-09-29 DIAGNOSIS — I10 PRIMARY HYPERTENSION: ICD-10-CM

## 2023-09-29 DIAGNOSIS — J45.909 MILD ASTHMA, UNSPECIFIED WHETHER COMPLICATED, UNSPECIFIED WHETHER PERSISTENT (HHS-HCC): ICD-10-CM

## 2023-09-29 DIAGNOSIS — L29.9 ITCHING: ICD-10-CM

## 2023-09-29 DIAGNOSIS — E11.9 TYPE 2 DIABETES MELLITUS WITHOUT COMPLICATION, WITHOUT LONG-TERM CURRENT USE OF INSULIN (MULTI): ICD-10-CM

## 2023-09-29 DIAGNOSIS — E78.2 MIXED HYPERLIPIDEMIA: ICD-10-CM

## 2023-09-29 DIAGNOSIS — Z00.00 ROUTINE GENERAL MEDICAL EXAMINATION AT HEALTH CARE FACILITY: Primary | ICD-10-CM

## 2023-09-29 DIAGNOSIS — Z00.00 WELCOME TO MEDICARE PREVENTIVE VISIT: ICD-10-CM

## 2023-09-29 DIAGNOSIS — Z23 NEED FOR VACCINATION: ICD-10-CM

## 2023-09-29 DIAGNOSIS — I65.23 BILATERAL CAROTID ARTERY STENOSIS: ICD-10-CM

## 2023-09-29 DIAGNOSIS — Z23 ENCOUNTER FOR VACCINATION: ICD-10-CM

## 2023-09-29 DIAGNOSIS — Z13.6 SCREENING FOR CARDIOVASCULAR CONDITION: ICD-10-CM

## 2023-09-29 DIAGNOSIS — Z11.59 NEED FOR HEPATITIS C SCREENING TEST: ICD-10-CM

## 2023-09-29 LAB
POC APPEARANCE, URINE: CLEAR
POC BILIRUBIN, URINE: NEGATIVE
POC BLOOD, URINE: NEGATIVE
POC COLOR, URINE: COLORLESS
POC GLUCOSE, URINE: NEGATIVE MG/DL
POC HEMOGLOBIN A1C: 6.4 % (ref 4.2–6.5)
POC KETONES, URINE: NEGATIVE MG/DL
POC LEUKOCYTES, URINE: NEGATIVE
POC NITRITE,URINE: NEGATIVE
POC PH, URINE: 5 PH
POC PROTEIN, URINE: NEGATIVE MG/DL
POC SPECIFIC GRAVITY, URINE: <=1.005
POC UROBILINOGEN, URINE: 0.2 EU/DL

## 2023-09-29 PROCEDURE — G0009 ADMIN PNEUMOCOCCAL VACCINE: HCPCS | Performed by: INTERNAL MEDICINE

## 2023-09-29 PROCEDURE — G0008 ADMIN INFLUENZA VIRUS VAC: HCPCS | Performed by: INTERNAL MEDICINE

## 2023-09-29 PROCEDURE — 1036F TOBACCO NON-USER: CPT | Performed by: INTERNAL MEDICINE

## 2023-09-29 PROCEDURE — 90677 PCV20 VACCINE IM: CPT | Performed by: INTERNAL MEDICINE

## 2023-09-29 PROCEDURE — 90662 IIV NO PRSV INCREASED AG IM: CPT | Performed by: INTERNAL MEDICINE

## 2023-09-29 PROCEDURE — 1160F RVW MEDS BY RX/DR IN RCRD: CPT | Performed by: INTERNAL MEDICINE

## 2023-09-29 PROCEDURE — G0403 EKG FOR INITIAL PREVENT EXAM: HCPCS | Performed by: INTERNAL MEDICINE

## 2023-09-29 PROCEDURE — 3074F SYST BP LT 130 MM HG: CPT | Performed by: INTERNAL MEDICINE

## 2023-09-29 PROCEDURE — 3079F DIAST BP 80-89 MM HG: CPT | Performed by: INTERNAL MEDICINE

## 2023-09-29 PROCEDURE — 4010F ACE/ARB THERAPY RXD/TAKEN: CPT | Performed by: INTERNAL MEDICINE

## 2023-09-29 PROCEDURE — 81002 URINALYSIS NONAUTO W/O SCOPE: CPT | Performed by: INTERNAL MEDICINE

## 2023-09-29 PROCEDURE — G0402 INITIAL PREVENTIVE EXAM: HCPCS | Performed by: INTERNAL MEDICINE

## 2023-09-29 PROCEDURE — 83036 HEMOGLOBIN GLYCOSYLATED A1C: CPT | Performed by: INTERNAL MEDICINE

## 2023-09-29 PROCEDURE — 1170F FXNL STATUS ASSESSED: CPT | Performed by: INTERNAL MEDICINE

## 2023-09-29 PROCEDURE — 1159F MED LIST DOCD IN RCRD: CPT | Performed by: INTERNAL MEDICINE

## 2023-09-29 RX ORDER — MOMETASONE FUROATE 220 UG/1
1 INHALANT RESPIRATORY (INHALATION) DAILY
Qty: 1 EACH | Refills: 5 | Status: SHIPPED | OUTPATIENT
Start: 2023-09-29 | End: 2024-01-31 | Stop reason: SDUPTHER

## 2023-09-29 RX ORDER — METFORMIN HYDROCHLORIDE 1000 MG/1
1000 TABLET ORAL
Qty: 180 TABLET | Refills: 3 | Status: SHIPPED | OUTPATIENT
Start: 2023-09-29 | End: 2024-09-28

## 2023-09-29 RX ORDER — METOPROLOL SUCCINATE 50 MG/1
50 TABLET, EXTENDED RELEASE ORAL DAILY
Qty: 90 TABLET | Refills: 3 | Status: SHIPPED | OUTPATIENT
Start: 2023-09-29 | End: 2024-09-28

## 2023-09-29 RX ORDER — FLUTICASONE PROPIONATE 220 UG/1
2 AEROSOL, METERED RESPIRATORY (INHALATION)
Qty: 12 G | Refills: 5 | Status: SHIPPED | OUTPATIENT
Start: 2023-09-29 | End: 2023-09-29 | Stop reason: ENTERED-IN-ERROR

## 2023-09-29 RX ORDER — MOMETASONE FUROATE 220 UG/1
INHALANT RESPIRATORY (INHALATION)
Qty: 3 EACH | Refills: 3 | Status: SHIPPED | OUTPATIENT
Start: 2023-09-29 | End: 2023-09-29 | Stop reason: WASHOUT

## 2023-09-29 RX ORDER — SIMVASTATIN 10 MG/1
10 TABLET, FILM COATED ORAL NIGHTLY
Qty: 90 TABLET | Refills: 3 | Status: SHIPPED | OUTPATIENT
Start: 2023-09-29 | End: 2024-09-28

## 2023-09-29 RX ORDER — LOSARTAN POTASSIUM 100 MG/1
100 TABLET ORAL DAILY
Qty: 90 TABLET | Refills: 3 | Status: SHIPPED | OUTPATIENT
Start: 2023-09-29 | End: 2024-09-28

## 2023-09-29 RX ORDER — HYDROXYZINE HYDROCHLORIDE 25 MG/1
25 TABLET, FILM COATED ORAL NIGHTLY
Qty: 90 TABLET | Refills: 3 | Status: SHIPPED | OUTPATIENT
Start: 2023-09-29

## 2023-09-29 ASSESSMENT — PATIENT HEALTH QUESTIONNAIRE - PHQ9
2. FEELING DOWN, DEPRESSED OR HOPELESS: NOT AT ALL
SUM OF ALL RESPONSES TO PHQ9 QUESTIONS 1 AND 2: 0
1. LITTLE INTEREST OR PLEASURE IN DOING THINGS: NOT AT ALL

## 2023-09-29 ASSESSMENT — ENCOUNTER SYMPTOMS
DEPRESSION: 0
OCCASIONAL FEELINGS OF UNSTEADINESS: 0
LOSS OF SENSATION IN FEET: 0

## 2023-09-29 ASSESSMENT — ACTIVITIES OF DAILY LIVING (ADL)
DRESSING: INDEPENDENT
DOING_HOUSEWORK: INDEPENDENT
GROCERY_SHOPPING: INDEPENDENT
MANAGING_FINANCES: INDEPENDENT
TAKING_MEDICATION: INDEPENDENT
BATHING: INDEPENDENT

## 2023-09-29 NOTE — PROGRESS NOTES
"Subjective    Parish Muñoz is a 63 y.o. female who presents for Welcome To Medicare.  HPI  She has lost some weight.   She had gastroenteritis. A month ago.  Her breathing is good. She is due for prevnar 20    Mammogram 5/2023  Bone density 10/2022  Colonoscopy 2021. Due 2024    Shingrix 2020/2021  Prevnar 13/23  2017 in [place]   No Living will  No chest pain, she sleeps well.       Review of Systems   All other systems reviewed and are negative.        Objective     /80 (BP Location: Left arm, Patient Position: Sitting, BP Cuff Size: Adult)   Pulse 78   Temp 36.2 °C (97.2 °F) (Skin)   Resp 14   Ht 1.676 m (5' 6\")   Wt 89.4 kg (197 lb)   SpO2 98%   BMI 31.80 kg/m²    Physical Exam  Constitutional:       Appearance: Normal appearance.   Cardiovascular:      Rate and Rhythm: Normal rate and regular rhythm.      Pulses: Normal pulses.   Pulmonary:      Effort: Pulmonary effort is normal.      Breath sounds: Normal breath sounds.   Chest:      Chest wall: No mass or tenderness.   Breasts:     Right: Normal.      Left: Normal.   Abdominal:      General: Abdomen is flat.   Musculoskeletal:      Cervical back: Neck supple. No tenderness.   Lymphadenopathy:      Cervical: No cervical adenopathy.      Upper Body:      Right upper body: No supraclavicular or axillary adenopathy.      Left upper body: No supraclavicular or axillary adenopathy.   Neurological:      Mental Status: She is alert.   Psychiatric:         Attention and Perception: Attention and perception normal.         Mood and Affect: Mood and affect normal.       Health Maintenance Due   Topic Date Due    Medicare Initial Physical (IPPE)  Never done    MMR Vaccines (1 of 1 - Standard series) Never done    Pneumococcal Vaccine: 65+ Years (1 - PCV) Never done    Diabetes: Foot Exam  Never done    Hepatitis C Screening  Never done    Cervical Cancer Screening  Never done    DTaP/Tdap/Td Vaccines (1 - Tdap) Never done    Diabetes: Urine Protein Screening "  09/20/2022    COVID-19 Vaccine (4 - Pfizer series) 01/05/2023    Lipid Panel  01/12/2023    Diabetes: Retinopathy Screening  06/27/2023    Influenza Vaccine (1) 09/01/2023    Bone Density Scan  10/12/2023          Assessment/Plan   Problem List Items Addressed This Visit       Asthma    Relevant Medications    mometasone (Asmanex Twisthaler) 220 mcg/ actuation (60) aerosol powdr breath activated    Diabetes (CMS/HCC)    Relevant Medications    metFORMIN (Glucophage) 1,000 mg tablet    Other Relevant Orders    POCT glycosylated hemoglobin (Hb A1C) manually resulted (Completed)    HLD (hyperlipidemia)    Relevant Medications    simvastatin (Zocor) 10 mg tablet    Hypertension    Relevant Medications    metoprolol succinate XL (Toprol-XL) 50 mg 24 hr tablet    losartan (Cozaar) 100 mg tablet    Other Relevant Orders    CBC     Other Visit Diagnoses       Routine general medical examination at health care facility    -  Primary    Encounter for vaccination        Relevant Orders    Flu vaccine, quadrivalent, high-dose, preservative free, age 65y+ (FLUZONE)    Welcome to Medicare preventive visit        Relevant Orders    POCT UA (nonautomated) manually resulted    CBC    Comprehensive Metabolic Panel    Lipid Panel    Itching        Relevant Medications    hydrOXYzine HCL (Atarax) 25 mg tablet    Screening for cardiovascular condition        Relevant Orders    ECG 12 lead    Need for vaccination        Relevant Orders    Pneumococcal conjugate vaccine 20-valent IM    Need for hepatitis C screening test        Relevant Orders    Hepatitis C antibody    Bilateral carotid artery stenosis        Relevant Orders    Vascular US carotid artery duplex bilateral        Stable based on symptoms. Continue established treatment plan and follow up at least yearly  Check labs, up to date on cancer screening  follow up in 3 months. Recommend yearly eye exams and self foot exams. call if any problems or questions.   Bp is good.    Prevnar and flu given today

## 2023-10-04 ENCOUNTER — ANCILLARY PROCEDURE (OUTPATIENT)
Dept: RADIOLOGY | Facility: CLINIC | Age: 63
End: 2023-10-04
Payer: MEDICARE

## 2023-10-04 DIAGNOSIS — I65.23 BILATERAL CAROTID ARTERY STENOSIS: ICD-10-CM

## 2023-10-04 PROCEDURE — 93880 EXTRACRANIAL BILAT STUDY: CPT

## 2023-10-04 PROCEDURE — 93880 EXTRACRANIAL BILAT STUDY: CPT | Performed by: RADIOLOGY

## 2023-10-05 ENCOUNTER — LAB (OUTPATIENT)
Dept: LAB | Facility: LAB | Age: 63
End: 2023-10-05
Payer: MEDICARE

## 2023-10-05 DIAGNOSIS — Z00.00 WELCOME TO MEDICARE PREVENTIVE VISIT: ICD-10-CM

## 2023-10-05 DIAGNOSIS — I10 PRIMARY HYPERTENSION: ICD-10-CM

## 2023-10-05 DIAGNOSIS — Z11.59 NEED FOR HEPATITIS C SCREENING TEST: ICD-10-CM

## 2023-10-05 LAB
ALBUMIN SERPL BCP-MCNC: 4.2 G/DL (ref 3.4–5)
ALP SERPL-CCNC: 44 U/L (ref 33–136)
ALT SERPL W P-5'-P-CCNC: 37 U/L (ref 7–45)
ANION GAP SERPL CALC-SCNC: 13 MMOL/L (ref 10–20)
AST SERPL W P-5'-P-CCNC: 42 U/L (ref 9–39)
BILIRUB SERPL-MCNC: 0.5 MG/DL (ref 0–1.2)
BUN SERPL-MCNC: 15 MG/DL (ref 6–23)
CALCIUM SERPL-MCNC: 10.2 MG/DL (ref 8.6–10.3)
CHLORIDE SERPL-SCNC: 107 MMOL/L (ref 98–107)
CHOLEST SERPL-MCNC: 134 MG/DL (ref 0–199)
CHOLESTEROL/HDL RATIO: 2
CO2 SERPL-SCNC: 31 MMOL/L (ref 21–32)
CREAT SERPL-MCNC: 1.01 MG/DL (ref 0.5–1.05)
ERYTHROCYTE [DISTWIDTH] IN BLOOD BY AUTOMATED COUNT: 21.3 % (ref 11.5–14.5)
GFR SERPL CREATININE-BSD FRML MDRD: 62 ML/MIN/1.73M*2
GLUCOSE SERPL-MCNC: 124 MG/DL (ref 74–99)
HCT VFR BLD AUTO: 35.2 % (ref 36–46)
HCV AB SER QL: NONREACTIVE
HDLC SERPL-MCNC: 67.2 MG/DL
HGB BLD-MCNC: 10.8 G/DL (ref 12–16)
LDLC SERPL CALC-MCNC: 54 MG/DL (ref 140–190)
MCH RBC QN AUTO: 26 PG (ref 26–34)
MCHC RBC AUTO-ENTMCNC: 30.7 G/DL (ref 32–36)
MCV RBC AUTO: 85 FL (ref 80–100)
NON HDL CHOLESTEROL: 67 MG/DL (ref 0–149)
NRBC BLD-RTO: 0 /100 WBCS (ref 0–0)
PLATELET # BLD AUTO: 309 X10*3/UL (ref 150–450)
PMV BLD AUTO: 10.4 FL (ref 7.5–11.5)
POTASSIUM SERPL-SCNC: 4.6 MMOL/L (ref 3.5–5.3)
PROT SERPL-MCNC: 7 G/DL (ref 6.4–8.2)
RBC # BLD AUTO: 4.16 X10*6/UL (ref 4–5.2)
SODIUM SERPL-SCNC: 146 MMOL/L (ref 136–145)
TRIGL SERPL-MCNC: 62 MG/DL (ref 0–149)
VLDL: 12 MG/DL (ref 0–40)
WBC # BLD AUTO: 4.3 X10*3/UL (ref 4.4–11.3)

## 2023-10-05 PROCEDURE — 36415 COLL VENOUS BLD VENIPUNCTURE: CPT

## 2023-10-05 PROCEDURE — 86803 HEPATITIS C AB TEST: CPT

## 2023-10-05 PROCEDURE — 80053 COMPREHEN METABOLIC PANEL: CPT

## 2023-10-05 PROCEDURE — 80061 LIPID PANEL: CPT

## 2023-10-05 PROCEDURE — 85027 COMPLETE CBC AUTOMATED: CPT

## 2023-10-06 ENCOUNTER — TELEPHONE (OUTPATIENT)
Dept: PRIMARY CARE | Facility: CLINIC | Age: 63
End: 2023-10-06
Payer: MEDICARE

## 2023-10-06 NOTE — TELEPHONE ENCOUNTER
Caroline Tovar, DO [place], CMA  Very small amount of plaque in left carotid. None on right. Repeat in a couple of years

## 2023-10-12 DIAGNOSIS — D64.9 ANEMIA, UNSPECIFIED TYPE: Primary | ICD-10-CM

## 2023-10-12 NOTE — RESULT ENCOUNTER NOTE
Mild chronic anemia. Otherwise labs are ok. She is up to date with her egd/colon  May be anemia of chronic disease.  However will recheck labs in 3 months to include vit b12 that has not been checked

## 2023-10-13 ENCOUNTER — TELEPHONE (OUTPATIENT)
Dept: PRIMARY CARE | Facility: CLINIC | Age: 63
End: 2023-10-13
Payer: MEDICARE

## 2023-10-13 NOTE — TELEPHONE ENCOUNTER
----- Message from Caroline Tovar DO sent at 10/12/2023 11:21 AM EDT -----  Mild chronic anemia. Otherwise labs are ok. She is up to date with her egd/colon  May be anemia of chronic disease.  However will recheck labs in 3 months to include vit b12 that has not been checked

## 2023-10-31 NOTE — PROGRESS NOTES
Subjective   Patient ID: [id number]   Parish Muñoz is a 63 y.o. female with Osteoporosis with vertebral fx, seronegative RA, asthma, obesity (BMI 33< 35), HLD, HTN, and DM, presents for follow up.      Current rheum meds:  - HCQ 400mg daily  - MTX 20mg (8 tabs) weekly (on Fridays), folic acid   - Vitamin D/Citracal daily    Prior rheum meds:  - Alendronate for 2 years ([id number]), stopped by her provider as she had improvement in BMD    HPI  Last DEXA:  DEXA 10/12/22:    LEFT FEMUR - TOTAL BMD: 1.144 g/cm2 T-score : 1.1    LEFT FEMUR - NECk BMD: 0.994 g/cm2 T-score : -0.3   SPINE L1-L4 BMD 1.223 g/cm2 T-score : 0.4    Eye exam in July of this year, had some swelling in the left eye related to DM, referred to a retina specialist, no tx, follow up in 10/23 showed improvement, will follow up in 6 months with her  Patient lost weight and had a drop in her A1c    + 10 minutes of morning stiffness   Wears arthritis gloves to bed, helps keep them warm and no pain   The patient is doing well  Complaints with med  + dry eyes     No side effects reported  No rashes  No infections  No eye inflammation  No chest pain or dyspnea   No fatigue  No hematochezia or melena  Had colonoscopy 2 years ago and EGD 1 year ago as work up for anemia, had some polys, will repeat colono in 1 year from today     History of falls / fractures: no  Loss of height: no  Tobacco: no  Alcohol: no  Vitamin D supplementation: yes  Calcium supplementation: yes  Weight bearing exercise: yes  Previous or planned invasive jaw surgery: no  History of radiation: no  Chronic steroid use: no  History of RA: yes  GERD: yes  Dillan-en-y: no  CKD / GFR <30: no  Parental hip fracture: no    ROS:  As per HPI     Rheum hx (Recall from Dr. Jimenez's notes):  At age of 52, presented with complaints of bilateral hand stiffness in morning with associated pain and intermittent joint swelling. Lab testing was negative but felt to have seronegative RA. She was started on  MTX and tapered up to 20mg weekly with addition of HCQ by Dr. Ramsey.      Current treatment:HCQ 400mg daily, MTX 20mg (8 tabs) weekly (on Fridays), folic acid   No side effects reported from meds. Gets annual eye examination- last done 8/2/2022     Osteoporosis : Traumatic fall from 2 step ladder resulted in vertebral fracture in 2018 (was in [place] at the time). Took Alendronate for 2 years ([id number]), stopped by her provider as she had improvement in BMD. She takes daily vitamin D/Citracal. No systemic steroid use. Menopause at age of 52-53. No hx of radiation. Sees dentist regularly.     PSH: back surgery (for herniated disc)  Social hx: Smoking: none, ETOH: none, Recreational drugs: none  FMH: none for rheumatic disease, she has no kids. Lives by herself,  passed away in 2018. Her sister lives next door    Patient Active Problem List   Diagnosis    Anemia    Asthma    Diabetes (CMS/[facility])    Dry eye    HLD (hyperlipidemia)    Hypertension    Long term methotrexate user    Osteoporosis    Rheumatoid arthritis (CMS/[facility])    Seasonal allergies    Stage 3a chronic kidney disease (CMS/[facility])    Vitamin B12 deficiency      History reviewed. No pertinent past medical history.     Past Surgical History:   Procedure Laterality Date    OTHER SURGICAL HISTORY  07/27/2020    Back surgery    OTHER SURGICAL HISTORY  07/27/2020    Lipoma excision    OTHER SURGICAL HISTORY  07/27/2020    Tonsillectomy with adenoidectomy      Social History     Socioeconomic History    Marital status: Single     Spouse name: Not on file    Number of children: Not on file    Years of education: Not on file    Highest education level: Not on file   Occupational History    Not on file   Tobacco Use    Smoking status: Never    Smokeless tobacco: Never   Vaping Use    Vaping Use: Never used   Substance and Sexual Activity    Alcohol use: Never    Drug use: Never    Sexual activity: Not on file   Other Topics Concern    Not on file   Social  History Narrative    Not on file     Social Determinants of Health     Financial Resource Strain: Not on file   Food Insecurity: Not on file   Transportation Needs: Not on file   Physical Activity: Not on file   Stress: Not on file   Social Connections: Not on file   Intimate Partner Violence: Not on file   Housing Stability: Not on file     Allergies   Allergen Reactions    Esomeprazole Other    Naproxen Sodium Other    Shellfish Derived Other    [place] Rash     Current Outpatient Medications:     ascorbic acid (Vitamin C) 1,000 mg tablet, Take by mouth., Disp: , Rfl:     aspirin 81 mg chewable tablet, Chew., Disp: , Rfl:     calcium-D3-zinc-copper-babar 325 mg-12.5 mcg -2.75 mg tablet, Take by mouth., Disp: , Rfl:     cetirizine (ZyrTEC) 10 mg tablet, Take 1 tablet (10 mg) by mouth once daily., Disp: , Rfl:     cholecalciferol (Vitamin D-3) 25 MCG (1000 UT) tablet, Take by mouth., Disp: , Rfl:     cranberry extract 200 mg capsule, Take by mouth., Disp: , Rfl:     cyanocobalamin, vitamin B-12, (Vitamin B-12) 1,000 mcg tablet extended release, Take 1 tablet (1,000 mcg) by mouth once daily., Disp: , Rfl:     fish oil concentrate (Omega-3) 120-180 mg capsule, Take by mouth., Disp: , Rfl:     folic acid (Folvite) 1 mg tablet, folic acid 1 mg tablet, Disp: , Rfl:     hydroxychloroquine (Plaquenil) 200 mg tablet, Take 1 tablet (200 mg) by mouth once daily., Disp: , Rfl:     hydrOXYzine HCL (Atarax) 25 mg tablet, Take 1 tablet (25 mg) by mouth once daily at bedtime., Disp: 90 tablet, Rfl: 3    lansoprazole (Prevacid) 15 mg DR capsule, Take 1 capsule (15 mg) by mouth once daily in the morning. Take before meals., Disp: , Rfl:     levalbuterol (Xopenex) 45 mcg/actuation inhaler, Inhale 1 puff every 4 hours if needed., Disp: , Rfl:     losartan (Cozaar) 100 mg tablet, Take 1 tablet (100 mg) by mouth once daily., Disp: 90 tablet, Rfl: 3    metFORMIN (Glucophage) 1,000 mg tablet, Take 1 tablet (1,000 mg) by mouth 2 times a day  with meals., Disp: 180 tablet, Rfl: 3    methotrexate (Trexall) 2.5 mg tablet, Take 8 tablets (20 mg total) by mouth 1 (one) time per week., Disp: , Rfl:     metoprolol succinate XL (Toprol-XL) 50 mg 24 hr tablet, Take 1 tablet (50 mg) by mouth once daily., Disp: 90 tablet, Rfl: 3    mometasone (Asmanex Twisthaler) 220 mcg/ actuation (60) aerosol powdr breath activated, Inhale 1 puff once daily., Disp: 1 each, Rfl: 5    simvastatin (Zocor) 10 mg tablet, Take 1 tablet (10 mg) by mouth once daily at bedtime., Disp: 90 tablet, Rfl: 3       Objective     Visit Vitals  /84 (BP Location: Left arm, Patient Position: Sitting)   Pulse 88      Physical Exam:  General: AAOx3, Cooperative  Skin: No rashes, ulcers or photosensitive areas  MSK: Upper Extremities:  Hand/Fingers: No erythema, edema, tenderness or warmth at DIP, PIP, or MCP joints, FROM grossly. Good hand . No nodules. No deformities   Wrists: No erythema, edema, warmth or tenderness at wrist, FROM grossly  Elbows: No tenderness, edema, erythema or warmth at elbows, FROM grossly. No nodules   Shoulders: No edema, erythema, tenderness or warmth at shoulders. FROM  Lower Extremities:   Hips: No obvious deformities. No joint tenderness, normal ROM grossly. Log roll test negative bilaterally. Celeste test is negative bilaterally. No trochanteric bursae TTP  Knees: No tenderness, deformities, edema, rashes, or warmth, normal ROM grossly. No crepitus, no pes anserine bursa TTP   Ankles, feet: No deformities, tenderness, edema, erythema, ulceration, or warmth at the ankle or MTP/IP joints, normal ROM grossly  Spine: No spinal tenderness to palpation. No SI joint tenderness. Gaenslen test negative      Lab Results   Component Value Date    WBC 4.3 (L) 10/05/2023    HGB 10.8 (L) 10/05/2023    HCT 35.2 (L) 10/05/2023    MCV 85 10/05/2023     10/05/2023        Chemistry    Lab Results   Component Value Date/Time     (H) 10/05/2023 1112    K 4.6 10/05/2023  1112     10/05/2023 1112    CO2 31 10/05/2023 1112    BUN 15 10/05/2023 1112    CREATININE 1.01 10/05/2023 1112    Lab Results   Component Value Date/Time    CALCIUM 10.2 10/05/2023 1112    ALKPHOS 44 10/05/2023 1112    AST 42 (H) 10/05/2023 1112    ALT 37 10/05/2023 1112    BILITOT 0.5 10/05/2023 1112        Lab Results   Component Value Date    ALT 37 10/05/2023    AST 42 (H) 10/05/2023    ALKPHOS 44 10/05/2023    BILITOT 0.5 10/05/2023      Lab Results   Component Value Date    CALCIUM 10.2 10/05/2023      Lab Results   Component Value Date    SPEP NORMAL 12/14/2022      EGD biopsies in 8/2023:  A.  2ND PORTION DUODENUM:    -- SMALL BOWEL MUCOSA WITH NO SIGNIFICANT PATHOLOGIC CHANGE.   -- NO MORPHOLOGIC EVIDENCE OF CELIAC DISEASE.     B.  GASTRITIS:    -- GASTRIC ANTRAL-TYPE MUCOSA WITH MILD REACTIVE EPITHELIAL CHANGE AND FOCAL   GOBLET CELL METAPLASIA.   -- NO HELICOBACTER PYLORI IDENTIFIED ON ROUTINE STAINED SLIDES.       Assessment/Plan:  This is a 64 yo F with osteoporosis with vertebral fx and seronegative RA, presents for follow up.  Last seen by Dr. Jimenez in 7/2023     Labs:  10/23:  AST 42, Na 146, WBC 4.3, Hg 10.8, hep C neg  12/22: SPEP no monoclonal gammopathy, Vitamin D 67.   8/3/22: RF< 10, CCP <1, CRP 0.28, SSA/SSB negative ESR 6 (always normal)     Imaging:  DEXA 10/12/22:    LEFT FEMUR - TOTAL BMD: 1.144 g/cm2 T-score : 1.1    LEFT FEMUR - NECk BMD: 0.994 g/cm2 T-score : -0.3   SPINE L1-L4 BMD 1.223 g/cm2 T-score : 0.4     # Seronegative RA, well controlled. No extra-articular manifestations. No active joints. CDAI 1  # Long term use of HCQ  # Long term use of Methotrexate   # OP, vertebral fx in 2018. Prior tx: Alendronate for 2 years ([id number]). Repeat DEXA 10/22 with improved/normal BMD. Monitoring off tx  # Anemia Hb 10  - Continue HCQ 400mg daily refilled  - Continue methotrexate refilled   - Labs before next mari (CBC, CMP, ESR, CPR)  - Follow up with ophtho in 6 months  - Vaccines UTD  including influenza, 4 doses of COVID 19, pna in 2017, shingrex 2021  - Continue Vitamin D at least 700 IU daily and calcium 1200mg day (diet + supplement)  - Follow up with PCP for anemia work up, has an mari in January   - Weight bearing exercise as tolerated  - Repeat DEXA in 10/24    RTC in 6 months     Plan, including risks and benefits, was discussed with the patient, informed on how to reach us.     To schedule an appointment, call [phone]  To reach the rheumatology office, call [phone]    Aida Riley MD   Division of Rheumatology  [facility]

## 2023-11-01 ENCOUNTER — OFFICE VISIT (OUTPATIENT)
Dept: RHEUMATOLOGY | Facility: CLINIC | Age: 63
End: 2023-11-01
Payer: MEDICARE

## 2023-11-01 VITALS
SYSTOLIC BLOOD PRESSURE: 136 MMHG | HEIGHT: 66 IN | DIASTOLIC BLOOD PRESSURE: 84 MMHG | WEIGHT: 197 LBS | BODY MASS INDEX: 31.66 KG/M2 | HEART RATE: 88 BPM

## 2023-11-01 DIAGNOSIS — M85.80 OSTEOPENIA, UNSPECIFIED LOCATION: ICD-10-CM

## 2023-11-01 DIAGNOSIS — Z79.60 LONG-TERM USE OF IMMUNOSUPPRESSANT MEDICATION: ICD-10-CM

## 2023-11-01 DIAGNOSIS — D64.9 ANEMIA, UNSPECIFIED TYPE: ICD-10-CM

## 2023-11-01 DIAGNOSIS — M06.042 RHEUMATOID ARTHRITIS INVOLVING BOTH HANDS WITH NEGATIVE RHEUMATOID FACTOR (MULTI): Primary | ICD-10-CM

## 2023-11-01 DIAGNOSIS — Z79.899 LONG-TERM USE OF HYDROXYCHLOROQUINE: ICD-10-CM

## 2023-11-01 DIAGNOSIS — M06.041 RHEUMATOID ARTHRITIS INVOLVING BOTH HANDS WITH NEGATIVE RHEUMATOID FACTOR (MULTI): Primary | ICD-10-CM

## 2023-11-01 PROCEDURE — 99214 OFFICE O/P EST MOD 30 MIN: CPT | Performed by: STUDENT IN AN ORGANIZED HEALTH CARE EDUCATION/TRAINING PROGRAM

## 2023-11-01 PROCEDURE — 1036F TOBACCO NON-USER: CPT | Performed by: STUDENT IN AN ORGANIZED HEALTH CARE EDUCATION/TRAINING PROGRAM

## 2023-11-01 PROCEDURE — 3079F DIAST BP 80-89 MM HG: CPT | Performed by: STUDENT IN AN ORGANIZED HEALTH CARE EDUCATION/TRAINING PROGRAM

## 2023-11-01 PROCEDURE — 3075F SYST BP GE 130 - 139MM HG: CPT | Performed by: STUDENT IN AN ORGANIZED HEALTH CARE EDUCATION/TRAINING PROGRAM

## 2023-11-01 PROCEDURE — 1160F RVW MEDS BY RX/DR IN RCRD: CPT | Performed by: STUDENT IN AN ORGANIZED HEALTH CARE EDUCATION/TRAINING PROGRAM

## 2023-11-01 PROCEDURE — 1159F MED LIST DOCD IN RCRD: CPT | Performed by: STUDENT IN AN ORGANIZED HEALTH CARE EDUCATION/TRAINING PROGRAM

## 2023-11-01 PROCEDURE — 4010F ACE/ARB THERAPY RXD/TAKEN: CPT | Performed by: STUDENT IN AN ORGANIZED HEALTH CARE EDUCATION/TRAINING PROGRAM

## 2023-11-01 PROCEDURE — 3048F LDL-C <100 MG/DL: CPT | Performed by: STUDENT IN AN ORGANIZED HEALTH CARE EDUCATION/TRAINING PROGRAM

## 2023-11-01 RX ORDER — HYDROXYCHLOROQUINE SULFATE 200 MG/1
200 TABLET, FILM COATED ORAL DAILY
Qty: 90 TABLET | Refills: 1 | Status: SHIPPED | OUTPATIENT
Start: 2023-11-01 | End: 2023-11-17 | Stop reason: SDUPTHER

## 2023-11-01 RX ORDER — FOLIC ACID 1 MG/1
1 TABLET ORAL DAILY
Qty: 90 TABLET | Refills: 1 | Status: SHIPPED | OUTPATIENT
Start: 2023-11-01 | End: 2024-04-29

## 2023-11-01 RX ORDER — METHOTREXATE 2.5 MG/1
20 TABLET ORAL
Qty: 96 TABLET | Refills: 1 | Status: SHIPPED | OUTPATIENT
Start: 2023-11-01 | End: 2024-05-01 | Stop reason: SDUPTHER

## 2023-11-17 DIAGNOSIS — M06.041 RHEUMATOID ARTHRITIS INVOLVING BOTH HANDS WITH NEGATIVE RHEUMATOID FACTOR (MULTI): ICD-10-CM

## 2023-11-17 DIAGNOSIS — M06.042 RHEUMATOID ARTHRITIS INVOLVING BOTH HANDS WITH NEGATIVE RHEUMATOID FACTOR (MULTI): ICD-10-CM

## 2023-11-17 RX ORDER — HYDROXYCHLOROQUINE SULFATE 200 MG/1
400 TABLET, FILM COATED ORAL DAILY
Qty: 360 TABLET | Refills: 0 | Status: SHIPPED | OUTPATIENT
Start: 2023-11-17 | End: 2024-05-01 | Stop reason: SDUPTHER

## 2023-12-03 ENCOUNTER — HEALTH MAINTENANCE LETTER (OUTPATIENT)
Age: 63
End: 2023-12-03

## 2024-01-24 ENCOUNTER — LAB (OUTPATIENT)
Dept: LAB | Facility: LAB | Age: 64
End: 2024-01-24
Payer: MEDICARE

## 2024-01-24 DIAGNOSIS — Z12.11 COLON CANCER SCREENING: Primary | ICD-10-CM

## 2024-01-24 DIAGNOSIS — D64.9 ANEMIA, UNSPECIFIED TYPE: ICD-10-CM

## 2024-01-24 LAB
ERYTHROCYTE [DISTWIDTH] IN BLOOD BY AUTOMATED COUNT: 20.1 % (ref 11.5–14.5)
HCT VFR BLD AUTO: 34.5 % (ref 36–46)
HGB BLD-MCNC: 10.5 G/DL (ref 12–16)
IRON SATN MFR SERPL: 7 %
IRON SERPL-MCNC: 29 UG/DL (ref 35–150)
MCH RBC QN AUTO: 25.4 PG (ref 26–34)
MCHC RBC AUTO-ENTMCNC: 30.4 G/DL (ref 32–36)
MCV RBC AUTO: 84 FL (ref 80–100)
NRBC BLD-RTO: 0 /100 WBCS (ref 0–0)
PLATELET # BLD AUTO: 350 X10*3/UL (ref 150–450)
RBC # BLD AUTO: 4.13 X10*6/UL (ref 4–5.2)
TIBC SERPL-MCNC: 412 UG/DL
UIBC SERPL-MCNC: 383 UG/DL (ref 110–370)
VIT B12 SERPL-MCNC: 921 PG/ML (ref 211–911)
WBC # BLD AUTO: 5.2 X10*3/UL (ref 4.4–11.3)

## 2024-01-24 PROCEDURE — 83540 ASSAY OF IRON: CPT

## 2024-01-24 PROCEDURE — 82607 VITAMIN B-12: CPT

## 2024-01-24 PROCEDURE — 83550 IRON BINDING TEST: CPT

## 2024-01-24 PROCEDURE — 85027 COMPLETE CBC AUTOMATED: CPT

## 2024-01-24 PROCEDURE — 36415 COLL VENOUS BLD VENIPUNCTURE: CPT

## 2024-01-25 NOTE — RESULT ENCOUNTER NOTE
She has low iron levels and mild anemia.   I ordered her colonoscopy which is due. Her egd was done.   I would like her to take otc iron sulfate 325 mg twice a day.   Will order repeat laBs are her arenwoup

## 2024-01-26 ENCOUNTER — TELEPHONE (OUTPATIENT)
Dept: PRIMARY CARE | Facility: CLINIC | Age: 64
End: 2024-01-26
Payer: MEDICARE

## 2024-01-26 NOTE — TELEPHONE ENCOUNTER
----- Message from Caroline Tovar DO sent at 1/25/2024  4:12 PM EST -----  She has low iron levels and mild anemia.   I ordered her colonoscopy which is due. Her egd was done.   I would like her to take otc iron sulfate 325 mg twice a day.   Will order repeat laBs are her follwoup

## 2024-01-31 ENCOUNTER — OFFICE VISIT (OUTPATIENT)
Dept: PRIMARY CARE | Facility: CLINIC | Age: 64
End: 2024-01-31
Payer: MEDICARE

## 2024-01-31 VITALS
WEIGHT: 201 LBS | DIASTOLIC BLOOD PRESSURE: 80 MMHG | SYSTOLIC BLOOD PRESSURE: 142 MMHG | HEART RATE: 76 BPM | RESPIRATION RATE: 14 BRPM | TEMPERATURE: 97.2 F | BODY MASS INDEX: 32.3 KG/M2 | HEIGHT: 66 IN

## 2024-01-31 DIAGNOSIS — R42 LIGHTHEADED: ICD-10-CM

## 2024-01-31 DIAGNOSIS — D50.0 IRON DEFICIENCY ANEMIA DUE TO CHRONIC BLOOD LOSS: ICD-10-CM

## 2024-01-31 DIAGNOSIS — N18.31 STAGE 3A CHRONIC KIDNEY DISEASE (MULTI): ICD-10-CM

## 2024-01-31 DIAGNOSIS — M06.042 RHEUMATOID ARTHRITIS INVOLVING BOTH HANDS WITH NEGATIVE RHEUMATOID FACTOR (MULTI): ICD-10-CM

## 2024-01-31 DIAGNOSIS — E11.9 TYPE 2 DIABETES MELLITUS WITHOUT COMPLICATION, WITHOUT LONG-TERM CURRENT USE OF INSULIN (MULTI): Primary | ICD-10-CM

## 2024-01-31 DIAGNOSIS — M06.041 RHEUMATOID ARTHRITIS INVOLVING BOTH HANDS WITH NEGATIVE RHEUMATOID FACTOR (MULTI): ICD-10-CM

## 2024-01-31 DIAGNOSIS — J45.909 MILD ASTHMA, UNSPECIFIED WHETHER COMPLICATED, UNSPECIFIED WHETHER PERSISTENT (HHS-HCC): ICD-10-CM

## 2024-01-31 DIAGNOSIS — E03.9 HYPOTHYROIDISM, UNSPECIFIED TYPE: ICD-10-CM

## 2024-01-31 LAB — POC HEMOGLOBIN A1C: 6.4 % (ref 4.2–6.5)

## 2024-01-31 PROCEDURE — 1036F TOBACCO NON-USER: CPT | Performed by: INTERNAL MEDICINE

## 2024-01-31 PROCEDURE — 3075F SYST BP GE 130 - 139MM HG: CPT | Performed by: INTERNAL MEDICINE

## 2024-01-31 PROCEDURE — 4010F ACE/ARB THERAPY RXD/TAKEN: CPT | Performed by: INTERNAL MEDICINE

## 2024-01-31 PROCEDURE — 93000 ELECTROCARDIOGRAM COMPLETE: CPT | Performed by: INTERNAL MEDICINE

## 2024-01-31 PROCEDURE — 1159F MED LIST DOCD IN RCRD: CPT | Performed by: INTERNAL MEDICINE

## 2024-01-31 PROCEDURE — 83036 HEMOGLOBIN GLYCOSYLATED A1C: CPT | Performed by: INTERNAL MEDICINE

## 2024-01-31 PROCEDURE — 3079F DIAST BP 80-89 MM HG: CPT | Performed by: INTERNAL MEDICINE

## 2024-01-31 PROCEDURE — 99214 OFFICE O/P EST MOD 30 MIN: CPT | Performed by: INTERNAL MEDICINE

## 2024-01-31 RX ORDER — MOMETASONE FUROATE 220 UG/1
1 INHALANT RESPIRATORY (INHALATION) 2 TIMES DAILY PRN
Qty: 3 EACH | Refills: 3 | Status: SHIPPED | OUTPATIENT
Start: 2024-01-31 | End: 2024-02-06 | Stop reason: SDUPTHER

## 2024-01-31 RX ORDER — FERROUS SULFATE 325(65) MG
65 TABLET, DELAYED RELEASE (ENTERIC COATED) ORAL 2 TIMES DAILY
COMMUNITY

## 2024-01-31 NOTE — PROGRESS NOTES
"Subjective    Parish Muñoz is a 63 y.o. female who presents for Diabetes.  HPI  3 month follow up DM  Does not check blood sugar.   No blood in her stool   No black or tarry stool.  No vaginal bleeding or spotting    Mammogram due in May  Colonoscopy due in December.     Has had intermittent dizziness, fatigue. Denies sob, chest pain. No palpitation  For the past month.  She will get lightheaded   Does not matter if she is standing up or sitting down. No syncope  She is feeling tired.       Review of Systems   All other systems reviewed and are negative.        Objective     /80 (BP Location: Left arm, Patient Position: Standing, BP Cuff Size: Adult)   Pulse 76   Temp 36.2 °C (97.2 °F) (Skin)   Resp 14   Ht 1.676 m (5' 6\")   Wt 91.2 kg (201 lb)   BMI 32.44 kg/m²    Physical Exam  Vitals reviewed.   Constitutional:       General: She is not in acute distress.     Appearance: Normal appearance.   Cardiovascular:      Rate and Rhythm: Normal rate and regular rhythm.      Pulses: Normal pulses.      Heart sounds: Normal heart sounds.   Pulmonary:      Effort: Pulmonary effort is normal.      Breath sounds: Normal breath sounds.   Abdominal:      Tenderness: There is no abdominal tenderness.   Musculoskeletal:         General: No swelling.   Skin:     General: Skin is warm and dry.   Neurological:      Mental Status: She is alert.       Health Maintenance Due   Topic Date Due    TSH Level  Never done    MMR Vaccines (1 of 1 - Standard series) Never done    Diabetes: Foot Exam  Never done    Cervical Cancer Screening  Never done    DTaP/Tdap/Td Vaccines (1 - Tdap) Never done    Diabetes: Urine Protein Screening  09/20/2022    Diabetes: Retinopathy Screening  06/27/2023          Assessment/Plan   Problem List Items Addressed This Visit       Anemia    Relevant Orders    Iron and TIBC    CBC    Asthma    Relevant Medications    mometasone (Asmanex Twisthaler) 220 mcg/ actuation (60) aerosol powdr breath " activated    Diabetes (CMS/[facility]) - Primary    Relevant Orders    POCT glycosylated hemoglobin (Hb A1C) manually resulted (Completed)    Rheumatoid arthritis (CMS/[facility])    Stage 3a chronic kidney disease (CMS/[facility])     Other Visit Diagnoses       Lightheaded        Relevant Orders    Holter or Event Cardiac Monitor    ECG 12 Lead    Hypothyroidism, unspecified type        Relevant Orders    TSH with reflex to Free T4 if abnormal        Her EKG is normal. Her orthostatics are negative.   Will order holter monitor.   Continue iron and recheck labs in a month  If she is not feeling better after replenishing iron will refer to cardio  If sx worsen she will call  Call  with any problems or questions.   Follow up in one month. Have labs done just before she comes in

## 2024-02-06 DIAGNOSIS — J45.909 MILD ASTHMA, UNSPECIFIED WHETHER COMPLICATED, UNSPECIFIED WHETHER PERSISTENT (HHS-HCC): ICD-10-CM

## 2024-02-06 RX ORDER — MOMETASONE FUROATE 220 UG/1
1 INHALANT RESPIRATORY (INHALATION) 2 TIMES DAILY
Qty: 3 EACH | Refills: 3 | Status: SHIPPED | OUTPATIENT
Start: 2024-02-06 | End: 2025-02-05

## 2024-02-08 ENCOUNTER — TELEPHONE (OUTPATIENT)
Dept: GASTROENTEROLOGY | Facility: CLINIC | Age: 64
End: 2024-02-08
Payer: MEDICARE

## 2024-02-16 ENCOUNTER — HOSPITAL ENCOUNTER (OUTPATIENT)
Dept: CARDIOLOGY | Facility: HOSPITAL | Age: 64
Discharge: HOME | End: 2024-02-16
Payer: MEDICARE

## 2024-02-16 DIAGNOSIS — R42 LIGHTHEADED: ICD-10-CM

## 2024-02-16 PROCEDURE — 93244 EXT ECG>48HR<7D REV&INTERPJ: CPT | Performed by: INTERNAL MEDICINE

## 2024-02-16 PROCEDURE — 93242 EXT ECG>48HR<7D RECORDING: CPT

## 2024-02-27 ENCOUNTER — LAB (OUTPATIENT)
Dept: LAB | Facility: LAB | Age: 64
End: 2024-02-27
Payer: MEDICARE

## 2024-02-27 DIAGNOSIS — E03.9 HYPOTHYROIDISM, UNSPECIFIED TYPE: ICD-10-CM

## 2024-02-27 DIAGNOSIS — D50.0 IRON DEFICIENCY ANEMIA DUE TO CHRONIC BLOOD LOSS: ICD-10-CM

## 2024-02-27 LAB
ERYTHROCYTE [DISTWIDTH] IN BLOOD BY AUTOMATED COUNT: 22.4 % (ref 11.5–14.5)
HCT VFR BLD AUTO: 35.5 % (ref 36–46)
HGB BLD-MCNC: 11 G/DL (ref 12–16)
IRON SATN MFR SERPL: 14 % (ref 25–45)
IRON SERPL-MCNC: 55 UG/DL (ref 35–150)
MCH RBC QN AUTO: 26.2 PG (ref 26–34)
MCHC RBC AUTO-ENTMCNC: 31 G/DL (ref 32–36)
MCV RBC AUTO: 85 FL (ref 80–100)
NRBC BLD-RTO: 0 /100 WBCS (ref 0–0)
PLATELET # BLD AUTO: 309 X10*3/UL (ref 150–450)
RBC # BLD AUTO: 4.2 X10*6/UL (ref 4–5.2)
TIBC SERPL-MCNC: 381 UG/DL (ref 240–445)
TSH SERPL-ACNC: 3.68 MIU/L (ref 0.44–3.98)
UIBC SERPL-MCNC: 326 UG/DL (ref 110–370)
WBC # BLD AUTO: 4.5 X10*3/UL (ref 4.4–11.3)

## 2024-02-27 PROCEDURE — 83540 ASSAY OF IRON: CPT

## 2024-02-27 PROCEDURE — 36415 COLL VENOUS BLD VENIPUNCTURE: CPT

## 2024-02-27 PROCEDURE — 85027 COMPLETE CBC AUTOMATED: CPT

## 2024-02-27 PROCEDURE — 84443 ASSAY THYROID STIM HORMONE: CPT

## 2024-02-27 PROCEDURE — 83550 IRON BINDING TEST: CPT

## 2024-02-28 ENCOUNTER — TELEPHONE (OUTPATIENT)
Dept: PRIMARY CARE | Facility: CLINIC | Age: 64
End: 2024-02-28
Payer: MEDICARE

## 2024-02-28 NOTE — TELEPHONE ENCOUNTER
----- Message from Caroline Tovar DO sent at 2/27/2024  4:51 PM EST -----  Her labs look better. Continue her iron

## 2024-03-04 ENCOUNTER — OFFICE VISIT (OUTPATIENT)
Dept: PRIMARY CARE | Facility: CLINIC | Age: 64
End: 2024-03-04
Payer: MEDICARE

## 2024-03-04 VITALS
DIASTOLIC BLOOD PRESSURE: 80 MMHG | BODY MASS INDEX: 32.3 KG/M2 | TEMPERATURE: 97.2 F | WEIGHT: 201 LBS | RESPIRATION RATE: 14 BRPM | HEART RATE: 72 BPM | HEIGHT: 66 IN | SYSTOLIC BLOOD PRESSURE: 118 MMHG

## 2024-03-04 DIAGNOSIS — D50.0 IRON DEFICIENCY ANEMIA DUE TO CHRONIC BLOOD LOSS: Primary | ICD-10-CM

## 2024-03-04 DIAGNOSIS — I10 PRIMARY HYPERTENSION: ICD-10-CM

## 2024-03-04 DIAGNOSIS — M62.838 TRAPEZIUS MUSCLE SPASM: ICD-10-CM

## 2024-03-04 DIAGNOSIS — R42 LIGHTHEADED: ICD-10-CM

## 2024-03-04 PROCEDURE — 1159F MED LIST DOCD IN RCRD: CPT | Performed by: INTERNAL MEDICINE

## 2024-03-04 PROCEDURE — 3074F SYST BP LT 130 MM HG: CPT | Performed by: INTERNAL MEDICINE

## 2024-03-04 PROCEDURE — 4010F ACE/ARB THERAPY RXD/TAKEN: CPT | Performed by: INTERNAL MEDICINE

## 2024-03-04 PROCEDURE — 1123F ACP DISCUSS/DSCN MKR DOCD: CPT | Performed by: INTERNAL MEDICINE

## 2024-03-04 PROCEDURE — 99214 OFFICE O/P EST MOD 30 MIN: CPT | Performed by: INTERNAL MEDICINE

## 2024-03-04 PROCEDURE — 1036F TOBACCO NON-USER: CPT | Performed by: INTERNAL MEDICINE

## 2024-03-04 PROCEDURE — 1160F RVW MEDS BY RX/DR IN RCRD: CPT | Performed by: INTERNAL MEDICINE

## 2024-03-04 PROCEDURE — 3079F DIAST BP 80-89 MM HG: CPT | Performed by: INTERNAL MEDICINE

## 2024-03-04 NOTE — PROGRESS NOTES
"Subjective    Parish Muñoz is a 63 y.o. female who presents for Follow-up.  HPI  1 month follow up   Dizziness resolved,  Taking iron bid   Returned holter monitor on 2/26/24 via ups, no results yet     Right upper arm pain radiates to shoulder to neck. No known injury. Took Ibuprofen 800mg for 3 days, decreased it on day 4 and 5. Has not used since.     Review of Systems   All other systems reviewed and are negative.        Objective     /80 (BP Location: Left arm, Patient Position: Sitting, BP Cuff Size: Adult)   Pulse 72   Temp 36.2 °C (97.2 °F) (Skin)   Resp 14   Ht 1.676 m (5' 6\")   Wt 91.2 kg (201 lb)   BMI 32.44 kg/m²    Physical Exam  Vitals reviewed.   Constitutional:       General: She is not in acute distress.     Appearance: Normal appearance.   Cardiovascular:      Rate and Rhythm: Normal rate and regular rhythm.      Pulses: Normal pulses.      Heart sounds: Normal heart sounds.   Pulmonary:      Effort: Pulmonary effort is normal.      Breath sounds: Normal breath sounds.   Abdominal:      Tenderness: There is no abdominal tenderness.   Musculoskeletal:         General: No swelling.      Comments: Right sided cervical spasm.  Mild tenderness down her right arm.    Skin:     General: Skin is warm and dry.   Neurological:      Mental Status: She is alert.       Health Maintenance Due   Topic Date Due    MMR Vaccines (1 of 1 - Standard series) Never done    Diabetes: Foot Exam  Never done    Cervical Cancer Screening  Never done    DTaP/Tdap/Td Vaccines (1 - Tdap) Never done    Diabetes: Urine Protein Screening  09/20/2022    Diabetes: Retinopathy Screening  06/27/2023    Diabetes: Hemoglobin A1C  04/30/2024          Assessment/Plan   Problem List Items Addressed This Visit       Anemia - Primary    Relevant Orders    Iron and TIBC    CBC    Hypertension     Other Visit Diagnoses       Lightheaded        Trapezius muscle spasm            Her lightheaded has resolved. Will obtain the " report from her zio patch  Her right trapezius and posterior neck muscles are spastic.  It is improving. She has a massage therapist, use heat and  biofreeze  Recheck her labs in a month or so.  Cont iron bid til then

## 2024-03-11 ENCOUNTER — TELEPHONE (OUTPATIENT)
Dept: PRIMARY CARE | Facility: CLINIC | Age: 64
End: 2024-03-11
Payer: MEDICARE

## 2024-03-11 NOTE — TELEPHONE ENCOUNTER
----- Message from Caroline Tovar DO sent at 3/11/2024  9:29 AM EDT -----  Her holter monitor is normal

## 2024-03-11 NOTE — TELEPHONE ENCOUNTER
----- Message from Diann Blum sent at 3/8/2024  1:38 PM EST -----  Called  Jermaine will fax the results to us.  ----- Message -----  From: Wayne Weaver CMA  Sent: 3/8/2024  11:30 AM EST  To: Diann Blum    Can you call biometrics at [facility] for results?  ----- Message -----  From: Caroline Tovar DO  Sent: 3/4/2024   3:59 PM EST  To: Wayne Weaver CMA    Can you please call [facility] and get her results from her zio patch

## 2024-04-01 ENCOUNTER — LAB (OUTPATIENT)
Dept: LAB | Facility: LAB | Age: 64
End: 2024-04-01
Payer: MEDICARE

## 2024-04-01 DIAGNOSIS — D50.0 IRON DEFICIENCY ANEMIA DUE TO CHRONIC BLOOD LOSS: ICD-10-CM

## 2024-04-01 LAB
ERYTHROCYTE [DISTWIDTH] IN BLOOD BY AUTOMATED COUNT: 21.9 % (ref 11.5–14.5)
HCT VFR BLD AUTO: 37.6 % (ref 36–46)
HGB BLD-MCNC: 11.7 G/DL (ref 12–16)
IRON SATN MFR SERPL: 36 % (ref 25–45)
IRON SERPL-MCNC: 128 UG/DL (ref 35–150)
MCH RBC QN AUTO: 27.1 PG (ref 26–34)
MCHC RBC AUTO-ENTMCNC: 31.1 G/DL (ref 32–36)
MCV RBC AUTO: 87 FL (ref 80–100)
NRBC BLD-RTO: 0 /100 WBCS (ref 0–0)
PLATELET # BLD AUTO: 277 X10*3/UL (ref 150–450)
RBC # BLD AUTO: 4.32 X10*6/UL (ref 4–5.2)
TIBC SERPL-MCNC: 357 UG/DL (ref 240–445)
UIBC SERPL-MCNC: 229 UG/DL (ref 110–370)
WBC # BLD AUTO: 4.1 X10*3/UL (ref 4.4–11.3)

## 2024-04-01 PROCEDURE — 83550 IRON BINDING TEST: CPT

## 2024-04-01 PROCEDURE — 85027 COMPLETE CBC AUTOMATED: CPT

## 2024-04-01 PROCEDURE — 83540 ASSAY OF IRON: CPT

## 2024-04-01 PROCEDURE — 36415 COLL VENOUS BLD VENIPUNCTURE: CPT

## 2024-04-02 ENCOUNTER — TELEPHONE (OUTPATIENT)
Dept: PRIMARY CARE | Facility: CLINIC | Age: 64
End: 2024-04-02
Payer: MEDICARE

## 2024-04-02 NOTE — TELEPHONE ENCOUNTER
----- Message from Caroline Tovar DO sent at 4/1/2024  4:05 PM EDT -----  Her labs are better. Continue bid iron for another month then go down to once a day

## 2024-04-23 NOTE — PROGRESS NOTES
Subjective   Patient ID: [id number]   Parish Muñoz is a 63 y.o. female with Osteoporosis with vertebral fx, seronegative RA, asthma, obesity (BMI 33< 35), HLD, HTN, and DM, presents for follow up     Current rheum meds:  - HCQ 400mg daily  - MTX 20mg (8 tabs) weekly (on Fridays), folic acid   - Vitamin D/Citracal daily    Prior rheum meds:  - Alendronate for 2 years ([id number]), stopped by her provider as she had improvement in BMD      HPI  Last DEXA:  DEXA 10/12/22:    LEFT FEMUR - TOTAL BMD: 1.144 g/cm2 T-score : 1.1    LEFT FEMUR - NECk BMD: 0.994 g/cm2 T-score : -0.3   SPINE L1-L4 BMD 1.223 g/cm2 T-score : 0.4    Eye exam in July of this year, had some swelling in the left eye related to DM, referred to a retina specialist, no tx, follow up in 10/23 showed improvement, will follow up in 6 months, which is in 2 days   Patient lost weight and had a drop in her A1c  Around 10 minutes of morning stiffness   Wears arthritis gloves to bed, helps keep them warm and no pain, less in the warm weather   The patient is doing well  Complaints with med  + dry eyes   No side effects reported  No rashes  No infections  No eye inflammation  No chest pain or dyspnea   No fatigue  No hematochezia or melena  Had colonoscopy 2 years ago and EGD 1 year ago as work up for anemia, had some polys, will repeat colono in 1 year from today     History of falls / fractures: no  Loss of height: no  Tobacco: no  Alcohol: no  Vitamin D supplementation: yes  Calcium supplementation: yes  Weight bearing exercise: yes  Previous or planned invasive jaw surgery: no  History of radiation: no  Chronic steroid use: no  History of RA: yes  GERD: yes  Dillan-en-y: no  CKD / GFR <30: no  Parental hip fracture: no    ROS:  As per HPI     Rheum hx (Recall from Dr. Jimenez's notes):  At age of 52, presented with complaints of bilateral hand stiffness in morning with associated pain and intermittent joint swelling. Lab testing was negative but felt to have  seronegative RA. She was started on MTX and tapered up to 20mg weekly with addition of HCQ by Dr. Ramsey.      Current treatment:HCQ 400mg daily, MTX 20mg (8 tabs) weekly (on Fridays), folic acid   No side effects reported from meds. Gets annual eye examination- last done 8/2/2022     Osteoporosis : Traumatic fall from 2 step ladder resulted in vertebral fracture in 2018 (was in [place] at the time). Took Alendronate for 2 years ([id number]), stopped by her provider as she had improvement in BMD. She takes daily vitamin D/Citracal. No systemic steroid use. Menopause at age of 52-53. No hx of radiation. Sees dentist regularly.     PSH: back surgery (for herniated disc)  Social hx: Smoking: none, ETOH: none, Recreational drugs: none  FMH: none for rheumatic disease, she has no kids. Lives by herself,  passed away in 2018. Her sister lives next door    Patient Active Problem List   Diagnosis    Anemia    Asthma (HHS-HCC)    Diabetes (Multi)    Dry eye    HLD (hyperlipidemia)    Hypertension    Long term methotrexate user    Osteoporosis    Rheumatoid arthritis (Multi)    Seasonal allergies    Stage 3a chronic kidney disease (Multi)    Vitamin B12 deficiency      History reviewed. No pertinent past medical history.     Past Surgical History:   Procedure Laterality Date    OTHER SURGICAL HISTORY  07/27/2020    Back surgery    OTHER SURGICAL HISTORY  07/27/2020    Lipoma excision    OTHER SURGICAL HISTORY  07/27/2020    Tonsillectomy with adenoidectomy      Social History     Socioeconomic History    Marital status: Single     Spouse name: Not on file    Number of children: Not on file    Years of education: Not on file    Highest education level: Not on file   Occupational History    Not on file   Tobacco Use    Smoking status: Never    Smokeless tobacco: Never   Vaping Use    Vaping status: Never Used   Substance and Sexual Activity    Alcohol use: Never    Drug use: Never    Sexual activity: Not on file    Other Topics Concern    Not on file   Social History Narrative    Not on file     Social Determinants of Health     Financial Resource Strain: Not on file   Food Insecurity: Not on file   Transportation Needs: Not on file   Physical Activity: Not on file   Stress: Not on file   Social Connections: Not on file   Intimate Partner Violence: Not on file   Housing Stability: Not on file     Allergies   Allergen Reactions    Esomeprazole Other    Naproxen Sodium Other    Shellfish Derived Other    [place] Rash     Current Outpatient Medications:     ascorbic acid (Vitamin C) 1,000 mg tablet, Take by mouth., Disp: , Rfl:     aspirin 81 mg chewable tablet, Chew., Disp: , Rfl:     calcium-D3-zinc-copper-babar 325 mg-12.5 mcg -2.75 mg tablet, Take by mouth., Disp: , Rfl:     cetirizine (ZyrTEC) 10 mg tablet, Take 1 tablet (10 mg) by mouth once daily., Disp: , Rfl:     cholecalciferol (Vitamin D-3) 25 MCG (1000 UT) tablet, Take by mouth., Disp: , Rfl:     cranberry extract 200 mg capsule, Take by mouth., Disp: , Rfl:     cyanocobalamin, vitamin B-12, (Vitamin B-12) 1,000 mcg tablet extended release, Take 1 tablet (1,000 mcg) by mouth once daily., Disp: , Rfl:     ferrous sulfate 325 (65 Fe) MG EC tablet, Take 65 mg by mouth 2 times a day. Do not crush, chew, or split., Disp: , Rfl:     fish oil concentrate (Omega-3) 120-180 mg capsule, Take by mouth., Disp: , Rfl:     hydroxychloroquine (Plaquenil) 200 mg tablet, Take 2 tablets (400 mg) by mouth once daily., Disp: 360 tablet, Rfl: 0    hydrOXYzine HCL (Atarax) 25 mg tablet, Take 1 tablet (25 mg) by mouth once daily at bedtime., Disp: 90 tablet, Rfl: 3    lansoprazole (Prevacid) 15 mg DR capsule, Take 1 capsule (15 mg) by mouth once daily in the morning. Take before meals., Disp: , Rfl:     levalbuterol (Xopenex) 45 mcg/actuation inhaler, Inhale 1 puff every 4 hours if needed., Disp: , Rfl:     losartan (Cozaar) 100 mg tablet, Take 1 tablet (100 mg) by mouth once daily., Disp:  90 tablet, Rfl: 3    metFORMIN (Glucophage) 1,000 mg tablet, Take 1 tablet (1,000 mg) by mouth 2 times a day with meals., Disp: 180 tablet, Rfl: 3    metoprolol succinate XL (Toprol-XL) 50 mg 24 hr tablet, Take 1 tablet (50 mg) by mouth once daily., Disp: 90 tablet, Rfl: 3    mometasone (Asmanex Twisthaler) 220 mcg/ actuation (60) aerosol powdr breath activated, Inhale 1 puff 2 times a day., Disp: 3 each, Rfl: 3    simvastatin (Zocor) 10 mg tablet, Take 1 tablet (10 mg) by mouth once daily at bedtime., Disp: 90 tablet, Rfl: 3     Objective   Visit Vitals  /86   Pulse 74   Temp 36.9 °C (98.4 °F)   Resp 20     Physical Exam:  General: AAOx3, Cooperative  Skin: No rashes, ulcers or photosensitive areas  MSK: Upper Extremities:  Hand/Fingers: No erythema, edema, tenderness or warmth at DIP, PIP, or MCP joints, FROM grossly. Good hand . No nodules. No deformities   Wrists: No erythema, edema, warmth or tenderness at wrist, FROM grossly  Elbows: No tenderness, edema, erythema or warmth at elbows, FROM grossly. No nodules   Shoulders: No edema, erythema, tenderness or warmth at shoulders. FROM  Lower Extremities:   Hips: No obvious deformities. No joint tenderness, normal ROM grossly. No trochanteric bursae TTP  Knees: No tenderness, deformities, edema, rashes, or warmth, normal ROM grossly. No crepitus, no pes anserine bursa TTP   Ankles, feet: No deformities, tenderness, edema, erythema, ulceration, or warmth at the ankle or MTP/IP joints, normal ROM grossly  Spine: No spinal tenderness to palpation. No SI joint tenderness    Lab Results   Component Value Date    WBC 5.0 04/25/2024    HGB 12.0 04/25/2024    HCT 37.0 04/25/2024    MCV 86 04/25/2024     04/25/2024        Chemistry    Lab Results   Component Value Date/Time     04/25/2024 1029    K 4.4 04/25/2024 1029     04/25/2024 1029    CO2 31 04/25/2024 1029    BUN 20 04/25/2024 1029    CREATININE 0.95 04/25/2024 1029    Lab Results    Component Value Date/Time    CALCIUM 10.2 04/25/2024 1029    ALKPHOS 43 04/25/2024 1029    AST 38 04/25/2024 1029    ALT 30 04/25/2024 1029    BILITOT 0.5 04/25/2024 1029        Lab Results   Component Value Date    ALT 30 04/25/2024    AST 38 04/25/2024    ALKPHOS 43 04/25/2024    BILITOT 0.5 04/25/2024      Lab Results   Component Value Date    CALCIUM 10.2 04/25/2024      Lab Results   Component Value Date    SPEP NORMAL 12/14/2022      EGD biopsies in 8/2023:  A.  2ND PORTION DUODENUM:    -- SMALL BOWEL MUCOSA WITH NO SIGNIFICANT PATHOLOGIC CHANGE.   -- NO MORPHOLOGIC EVIDENCE OF CELIAC DISEASE.     B.  GASTRITIS:    -- GASTRIC ANTRAL-TYPE MUCOSA WITH MILD REACTIVE EPITHELIAL CHANGE AND FOCAL   GOBLET CELL METAPLASIA.   -- NO HELICOBACTER PYLORI IDENTIFIED ON ROUTINE STAINED SLIDES.       Assessment/Plan    This is a 64 yo F with osteoporosis with vertebral fx and seronegative RA, presents for follow up  Last seen in 11/23     Labs:  4/24: CBC, CMP, CRP, ESR, Vitamin D wnl  1/24: iron studies low -> normal, Hb 11.7, rest of CBC, TSH wnl  10/23:  AST 42, Na 146, WBC 4.3, Hg 10.8, hep C neg  12/22: SPEP no monoclonal gammopathy, Vitamin D 67.   8/3/22: RF< 10, CCP <1, CRP 0.28, SSA/SSB negative ESR 6 (always normal)     Imaging:  DEXA 10/12/22:    LEFT FEMUR - TOTAL BMD: 1.144 g/cm2 T-score : 1.1    LEFT FEMUR - NECk BMD: 0.994 g/cm2 T-score : -0.3   SPINE L1-L4 BMD 1.223 g/cm2 T-score : 0.4     # Seronegative RA, well controlled. No extra-articular manifestations. No active joints. CDAI 1  # Long term use of HCQ  # Long term use of Methotrexate   # OP, vertebral fx in 2018. Prior tx: Alendronate for 2 years ([id number]). Repeat DEXA 10/22 with improved/normal BMD. Monitoring off tx  # Anemia Hb 10    - Continue HCQ 400mg daily refilled  - Continue methotrexate refilled   - Labs before next mari (CBC, CMP, ESR, CPR)  - Follow up with ophtho   - Vaccines UTD including influenza, 4 doses of COVID 19, pna in 2017,  shingrex 2021  - Continue Vitamin D, make it 1 pill every other day (level is 90)  - Continue calcium  - Weight bearing exercise as tolerated  - Repeat DEXA in 10/24, ordered     RTC in 6 months     Plan, including risks and benefits, was discussed with the patient, informed on how to reach us.     To schedule an appointment, call [phone]  To reach the rheumatology office, call [phone]    Aida Riley MD   Division of Rheumatology  [facility]

## 2024-04-25 ENCOUNTER — LAB (OUTPATIENT)
Dept: LAB | Facility: LAB | Age: 64
End: 2024-04-25
Payer: MEDICARE

## 2024-04-25 DIAGNOSIS — M85.80 OSTEOPENIA, UNSPECIFIED LOCATION: ICD-10-CM

## 2024-04-25 DIAGNOSIS — M06.041 RHEUMATOID ARTHRITIS INVOLVING BOTH HANDS WITH NEGATIVE RHEUMATOID FACTOR (MULTI): ICD-10-CM

## 2024-04-25 DIAGNOSIS — M06.042 RHEUMATOID ARTHRITIS INVOLVING BOTH HANDS WITH NEGATIVE RHEUMATOID FACTOR (MULTI): ICD-10-CM

## 2024-04-25 LAB
25(OH)D3 SERPL-MCNC: 89 NG/ML (ref 30–100)
ALBUMIN SERPL BCP-MCNC: 4.4 G/DL (ref 3.4–5)
ALP SERPL-CCNC: 43 U/L (ref 33–136)
ALT SERPL W P-5'-P-CCNC: 30 U/L (ref 7–45)
ANION GAP SERPL CALC-SCNC: 12 MMOL/L (ref 10–20)
AST SERPL W P-5'-P-CCNC: 38 U/L (ref 9–39)
BASOPHILS # BLD AUTO: 0.06 X10*3/UL (ref 0–0.1)
BASOPHILS NFR BLD AUTO: 1.2 %
BILIRUB SERPL-MCNC: 0.5 MG/DL (ref 0–1.2)
BUN SERPL-MCNC: 20 MG/DL (ref 6–23)
CALCIUM SERPL-MCNC: 10.2 MG/DL (ref 8.6–10.3)
CHLORIDE SERPL-SCNC: 101 MMOL/L (ref 98–107)
CO2 SERPL-SCNC: 31 MMOL/L (ref 21–32)
CREAT SERPL-MCNC: 0.95 MG/DL (ref 0.5–1.05)
CRP SERPL-MCNC: 0.16 MG/DL
EGFRCR SERPLBLD CKD-EPI 2021: 67 ML/MIN/1.73M*2
EOSINOPHIL # BLD AUTO: 0.39 X10*3/UL (ref 0–0.7)
EOSINOPHIL NFR BLD AUTO: 7.8 %
ERYTHROCYTE [DISTWIDTH] IN BLOOD BY AUTOMATED COUNT: 20.9 % (ref 11.5–14.5)
ERYTHROCYTE [SEDIMENTATION RATE] IN BLOOD BY WESTERGREN METHOD: 5 MM/H (ref 0–30)
GLUCOSE SERPL-MCNC: 129 MG/DL (ref 74–99)
HCT VFR BLD AUTO: 37 % (ref 36–46)
HGB BLD-MCNC: 12 G/DL (ref 12–16)
IMM GRANULOCYTES # BLD AUTO: 0.01 X10*3/UL (ref 0–0.7)
IMM GRANULOCYTES NFR BLD AUTO: 0.2 % (ref 0–0.9)
LYMPHOCYTES # BLD AUTO: 1.05 X10*3/UL (ref 1.2–4.8)
LYMPHOCYTES NFR BLD AUTO: 21 %
MCH RBC QN AUTO: 27.9 PG (ref 26–34)
MCHC RBC AUTO-ENTMCNC: 32.4 G/DL (ref 32–36)
MCV RBC AUTO: 86 FL (ref 80–100)
MONOCYTES # BLD AUTO: 0.48 X10*3/UL (ref 0.1–1)
MONOCYTES NFR BLD AUTO: 9.6 %
NEUTROPHILS # BLD AUTO: 3.01 X10*3/UL (ref 1.2–7.7)
NEUTROPHILS NFR BLD AUTO: 60.2 %
NRBC BLD-RTO: 0 /100 WBCS (ref 0–0)
OVALOCYTES BLD QL SMEAR: NORMAL
PLATELET # BLD AUTO: 294 X10*3/UL (ref 150–450)
POTASSIUM SERPL-SCNC: 4.4 MMOL/L (ref 3.5–5.3)
PROT SERPL-MCNC: 6.8 G/DL (ref 6.4–8.2)
RBC # BLD AUTO: 4.3 X10*6/UL (ref 4–5.2)
RBC MORPH BLD: NORMAL
SODIUM SERPL-SCNC: 140 MMOL/L (ref 136–145)
WBC # BLD AUTO: 5 X10*3/UL (ref 4.4–11.3)

## 2024-04-25 PROCEDURE — 36415 COLL VENOUS BLD VENIPUNCTURE: CPT

## 2024-04-25 PROCEDURE — 85652 RBC SED RATE AUTOMATED: CPT

## 2024-04-25 PROCEDURE — 85025 COMPLETE CBC W/AUTO DIFF WBC: CPT

## 2024-04-25 PROCEDURE — 80053 COMPREHEN METABOLIC PANEL: CPT

## 2024-04-25 PROCEDURE — 86140 C-REACTIVE PROTEIN: CPT

## 2024-04-25 PROCEDURE — 82306 VITAMIN D 25 HYDROXY: CPT

## 2024-05-01 ENCOUNTER — OFFICE VISIT (OUTPATIENT)
Dept: RHEUMATOLOGY | Facility: CLINIC | Age: 64
End: 2024-05-01
Payer: MEDICARE

## 2024-05-01 VITALS
SYSTOLIC BLOOD PRESSURE: 163 MMHG | TEMPERATURE: 98.4 F | WEIGHT: 200 LBS | BODY MASS INDEX: 32.28 KG/M2 | HEART RATE: 74 BPM | DIASTOLIC BLOOD PRESSURE: 86 MMHG | RESPIRATION RATE: 20 BRPM

## 2024-05-01 DIAGNOSIS — M85.80 OSTEOPENIA, UNSPECIFIED LOCATION: ICD-10-CM

## 2024-05-01 DIAGNOSIS — M06.041 RHEUMATOID ARTHRITIS INVOLVING BOTH HANDS WITH NEGATIVE RHEUMATOID FACTOR (MULTI): Primary | ICD-10-CM

## 2024-05-01 DIAGNOSIS — Z79.899 LONG-TERM USE OF HYDROXYCHLOROQUINE: ICD-10-CM

## 2024-05-01 DIAGNOSIS — D64.9 ANEMIA, UNSPECIFIED TYPE: ICD-10-CM

## 2024-05-01 DIAGNOSIS — M85.852 OSTEOPENIA OF LEFT HIP: ICD-10-CM

## 2024-05-01 DIAGNOSIS — Z79.631 ON METHOTREXATE THERAPY: ICD-10-CM

## 2024-05-01 DIAGNOSIS — M06.042 RHEUMATOID ARTHRITIS INVOLVING BOTH HANDS WITH NEGATIVE RHEUMATOID FACTOR (MULTI): Primary | ICD-10-CM

## 2024-05-01 PROCEDURE — 3079F DIAST BP 80-89 MM HG: CPT | Performed by: STUDENT IN AN ORGANIZED HEALTH CARE EDUCATION/TRAINING PROGRAM

## 2024-05-01 PROCEDURE — 4010F ACE/ARB THERAPY RXD/TAKEN: CPT | Performed by: STUDENT IN AN ORGANIZED HEALTH CARE EDUCATION/TRAINING PROGRAM

## 2024-05-01 PROCEDURE — 1159F MED LIST DOCD IN RCRD: CPT | Performed by: STUDENT IN AN ORGANIZED HEALTH CARE EDUCATION/TRAINING PROGRAM

## 2024-05-01 PROCEDURE — 1123F ACP DISCUSS/DSCN MKR DOCD: CPT | Performed by: STUDENT IN AN ORGANIZED HEALTH CARE EDUCATION/TRAINING PROGRAM

## 2024-05-01 PROCEDURE — 3077F SYST BP >= 140 MM HG: CPT | Performed by: STUDENT IN AN ORGANIZED HEALTH CARE EDUCATION/TRAINING PROGRAM

## 2024-05-01 PROCEDURE — 99214 OFFICE O/P EST MOD 30 MIN: CPT | Performed by: STUDENT IN AN ORGANIZED HEALTH CARE EDUCATION/TRAINING PROGRAM

## 2024-05-01 PROCEDURE — 1160F RVW MEDS BY RX/DR IN RCRD: CPT | Performed by: STUDENT IN AN ORGANIZED HEALTH CARE EDUCATION/TRAINING PROGRAM

## 2024-05-01 RX ORDER — HYDROXYCHLOROQUINE SULFATE 200 MG/1
400 TABLET, FILM COATED ORAL DAILY
Qty: 360 TABLET | Refills: 0 | Status: SHIPPED | OUTPATIENT
Start: 2024-05-01 | End: 2024-10-28

## 2024-05-01 RX ORDER — METHOTREXATE 2.5 MG/1
20 TABLET ORAL
Qty: 96 TABLET | Refills: 1 | Status: SHIPPED | OUTPATIENT
Start: 2024-05-05 | End: 2024-10-20

## 2024-05-10 ENCOUNTER — APPOINTMENT (OUTPATIENT)
Dept: RADIOLOGY | Facility: CLINIC | Age: 64
End: 2024-05-10
Payer: MEDICARE

## 2024-05-10 DIAGNOSIS — Z12.31 SCREENING MAMMOGRAM FOR BREAST CANCER: ICD-10-CM

## 2024-06-06 ENCOUNTER — HOSPITAL ENCOUNTER (OUTPATIENT)
Dept: RADIOLOGY | Facility: CLINIC | Age: 64
Discharge: HOME | End: 2024-06-06
Payer: MEDICARE

## 2024-06-06 VITALS — WEIGHT: 196 LBS | HEIGHT: 66 IN | BODY MASS INDEX: 31.5 KG/M2

## 2024-06-06 DIAGNOSIS — Z12.31 SCREENING MAMMOGRAM FOR BREAST CANCER: ICD-10-CM

## 2024-06-06 PROCEDURE — 77067 SCR MAMMO BI INCL CAD: CPT | Performed by: RADIOLOGY

## 2024-06-06 PROCEDURE — 77067 SCR MAMMO BI INCL CAD: CPT

## 2024-06-06 PROCEDURE — 77063 BREAST TOMOSYNTHESIS BI: CPT | Performed by: RADIOLOGY

## 2024-06-17 PROBLEM — Z71.89 ADVANCED DIRECTIVES, COUNSELING/DISCUSSION: Status: RESOLVED | Noted: 2017-10-26 | Resolved: 2024-06-17

## 2024-07-08 ENCOUNTER — APPOINTMENT (OUTPATIENT)
Dept: PRIMARY CARE | Facility: CLINIC | Age: 64
End: 2024-07-08
Payer: MEDICARE

## 2024-07-08 VITALS
HEIGHT: 66 IN | DIASTOLIC BLOOD PRESSURE: 70 MMHG | WEIGHT: 199 LBS | BODY MASS INDEX: 31.98 KG/M2 | RESPIRATION RATE: 14 BRPM | SYSTOLIC BLOOD PRESSURE: 128 MMHG | TEMPERATURE: 97 F | OXYGEN SATURATION: 97 % | HEART RATE: 73 BPM

## 2024-07-08 DIAGNOSIS — E11.9 TYPE 2 DIABETES MELLITUS WITHOUT COMPLICATION, WITHOUT LONG-TERM CURRENT USE OF INSULIN (MULTI): ICD-10-CM

## 2024-07-08 DIAGNOSIS — J45.20 MILD INTERMITTENT ASTHMA, UNSPECIFIED WHETHER COMPLICATED (HHS-HCC): Primary | ICD-10-CM

## 2024-07-08 DIAGNOSIS — I10 PRIMARY HYPERTENSION: ICD-10-CM

## 2024-07-08 LAB — POC HEMOGLOBIN A1C: 6.5 % (ref 4.2–6.5)

## 2024-07-08 PROCEDURE — 1159F MED LIST DOCD IN RCRD: CPT | Performed by: INTERNAL MEDICINE

## 2024-07-08 PROCEDURE — 3074F SYST BP LT 130 MM HG: CPT | Performed by: INTERNAL MEDICINE

## 2024-07-08 PROCEDURE — 99213 OFFICE O/P EST LOW 20 MIN: CPT | Performed by: INTERNAL MEDICINE

## 2024-07-08 PROCEDURE — 3078F DIAST BP <80 MM HG: CPT | Performed by: INTERNAL MEDICINE

## 2024-07-08 PROCEDURE — 1124F ACP DISCUSS-NO DSCNMKR DOCD: CPT | Performed by: INTERNAL MEDICINE

## 2024-07-08 PROCEDURE — 1036F TOBACCO NON-USER: CPT | Performed by: INTERNAL MEDICINE

## 2024-07-08 PROCEDURE — 83036 HEMOGLOBIN GLYCOSYLATED A1C: CPT | Performed by: INTERNAL MEDICINE

## 2024-07-08 PROCEDURE — 4010F ACE/ARB THERAPY RXD/TAKEN: CPT | Performed by: INTERNAL MEDICINE

## 2024-07-08 PROCEDURE — 1160F RVW MEDS BY RX/DR IN RCRD: CPT | Performed by: INTERNAL MEDICINE

## 2024-07-08 NOTE — PROGRESS NOTES
"Subjective    Parish Muñoz is a 63 y.o. female who presents for Diabetes.  HPI    3 month fu DM   Due for wellness in September  Last A1c 6.4  She  is feeling well.     Review of Systems   All other systems reviewed and are negative.        Objective     /70 (BP Location: Left arm, Patient Position: Sitting, BP Cuff Size: Adult)   Pulse 73   Temp 36.1 °C (97 °F) (Skin)   Resp 14   Ht 1.676 m (5' 6\")   Wt 90.3 kg (199 lb)   LMP  (LMP Unknown)   SpO2 97%   BMI 32.12 kg/m²    Physical Exam  Health Maintenance Due   Topic Date Due    MMR Vaccines (1 of 1 - Standard series) Never done    Diabetes: Foot Exam  Never done    Cervical Cancer Screening  Never done    DTaP/Tdap/Td Vaccines (1 - Tdap) Never done    RSV Pregnant patients and/or  patients aged 60+ years (1 - 1-dose 60+ series) Never done    Diabetes: Urine Protein Screening  09/20/2022    COVID-19 Vaccine (7 - 2023-24 season) 03/16/2024          Assessment/Plan   Problem List Items Addressed This Visit       Asthma ([facility]) - Primary    Diabetes (Multi)    Relevant Orders    POCT glycosylated hemoglobin (Hb A1C) manually resulted (Completed)    Hypertension   follow up in 3 months. Recommend yearly eye exams and self foot exams. call if any problems or questions.   Bp is good    "

## 2024-07-23 ENCOUNTER — TELEPHONE (OUTPATIENT)
Dept: GASTROENTEROLOGY | Facility: CLINIC | Age: 64
End: 2024-07-23
Payer: MEDICARE

## 2024-07-25 DIAGNOSIS — Z12.11 COLON CANCER SCREENING: ICD-10-CM

## 2024-07-25 RX ORDER — POLYETHYLENE GLYCOL 3350, SODIUM CHLORIDE, SODIUM BICARBONATE AND POTASSIUM CHLORIDE 420; 5.72; 11.2; 1.48 G/4L; G/4L; G/4L; G/4L
4000 POWDER, FOR SOLUTION ORAL ONCE
Qty: 4000 ML | Refills: 0 | Status: SHIPPED | OUTPATIENT
Start: 2024-07-25 | End: 2024-07-25

## 2024-09-29 DIAGNOSIS — L29.9 ITCHING: ICD-10-CM

## 2024-09-29 DIAGNOSIS — I10 PRIMARY HYPERTENSION: ICD-10-CM

## 2024-09-30 DIAGNOSIS — M06.042 RHEUMATOID ARTHRITIS INVOLVING BOTH HANDS WITH NEGATIVE RHEUMATOID FACTOR (MULTI): ICD-10-CM

## 2024-09-30 DIAGNOSIS — M06.041 RHEUMATOID ARTHRITIS INVOLVING BOTH HANDS WITH NEGATIVE RHEUMATOID FACTOR (MULTI): ICD-10-CM

## 2024-09-30 DIAGNOSIS — I10 PRIMARY HYPERTENSION: ICD-10-CM

## 2024-09-30 DIAGNOSIS — L29.9 ITCHING: ICD-10-CM

## 2024-09-30 RX ORDER — METOPROLOL SUCCINATE 50 MG/1
50 TABLET, EXTENDED RELEASE ORAL DAILY
Qty: 90 TABLET | Refills: 3 | Status: SHIPPED | OUTPATIENT
Start: 2024-09-30

## 2024-09-30 RX ORDER — METHOTREXATE 2.5 MG/1
20 TABLET ORAL
Qty: 96 TABLET | Refills: 1 | Status: SHIPPED | OUTPATIENT
Start: 2024-10-06 | End: 2025-03-23

## 2024-09-30 RX ORDER — HYDROXYCHLOROQUINE SULFATE 200 MG/1
400 TABLET, FILM COATED ORAL DAILY
Qty: 180 TABLET | Refills: 1 | Status: SHIPPED | OUTPATIENT
Start: 2024-09-30 | End: 2025-03-29

## 2024-09-30 RX ORDER — METOPROLOL SUCCINATE 50 MG/1
50 TABLET, EXTENDED RELEASE ORAL DAILY
Qty: 90 TABLET | Refills: 3 | Status: SHIPPED | OUTPATIENT
Start: 2024-09-30 | End: 2024-09-30 | Stop reason: SDUPTHER

## 2024-09-30 RX ORDER — HYDROXYZINE HYDROCHLORIDE 25 MG/1
25 TABLET, FILM COATED ORAL NIGHTLY
Qty: 90 TABLET | Refills: 3 | Status: SHIPPED | OUTPATIENT
Start: 2024-09-30

## 2024-09-30 RX ORDER — HYDROXYZINE HYDROCHLORIDE 25 MG/1
25 TABLET, FILM COATED ORAL NIGHTLY
Qty: 90 TABLET | Refills: 3 | Status: SHIPPED | OUTPATIENT
Start: 2024-09-30 | End: 2024-09-30 | Stop reason: SDUPTHER

## 2024-09-30 NOTE — TELEPHONE ENCOUNTER
Parish Muñoz [place] [address] Clinical Support Staff  Phone Number: [phone]     [facility] should be contacting you for approval to fill Metoprolol and Hydroxizine.  I will run out before my appointment.  Thanks

## 2024-10-03 DIAGNOSIS — M06.041 RHEUMATOID ARTHRITIS INVOLVING BOTH HANDS WITH NEGATIVE RHEUMATOID FACTOR (MULTI): ICD-10-CM

## 2024-10-03 DIAGNOSIS — M06.042 RHEUMATOID ARTHRITIS INVOLVING BOTH HANDS WITH NEGATIVE RHEUMATOID FACTOR (MULTI): ICD-10-CM

## 2024-10-03 RX ORDER — METHOTREXATE 2.5 MG/1
20 TABLET ORAL
Qty: 96 TABLET | Refills: 1 | OUTPATIENT
Start: 2024-10-06 | End: 2025-03-23

## 2024-10-03 RX ORDER — HYDROXYCHLOROQUINE SULFATE 200 MG/1
400 TABLET, FILM COATED ORAL DAILY
Qty: 180 TABLET | Refills: 1 | OUTPATIENT
Start: 2024-10-03 | End: 2025-04-01

## 2024-10-08 DIAGNOSIS — M06.042 RHEUMATOID ARTHRITIS INVOLVING BOTH HANDS WITH NEGATIVE RHEUMATOID FACTOR (MULTI): ICD-10-CM

## 2024-10-08 DIAGNOSIS — M06.041 RHEUMATOID ARTHRITIS INVOLVING BOTH HANDS WITH NEGATIVE RHEUMATOID FACTOR (MULTI): ICD-10-CM

## 2024-10-08 RX ORDER — METHOTREXATE 2.5 MG/1
20 TABLET ORAL
Qty: 96 TABLET | Refills: 1 | Status: SHIPPED | OUTPATIENT
Start: 2024-10-08 | End: 2025-03-25

## 2024-10-10 ENCOUNTER — ANESTHESIA EVENT (OUTPATIENT)
Dept: GASTROENTEROLOGY | Facility: EXTERNAL LOCATION | Age: 64
End: 2024-10-10

## 2024-10-14 ENCOUNTER — APPOINTMENT (OUTPATIENT)
Dept: PRIMARY CARE | Facility: CLINIC | Age: 64
End: 2024-10-14
Payer: MEDICARE

## 2024-10-14 VITALS
BODY MASS INDEX: 31.66 KG/M2 | DIASTOLIC BLOOD PRESSURE: 88 MMHG | HEIGHT: 66 IN | RESPIRATION RATE: 14 BRPM | HEART RATE: 68 BPM | WEIGHT: 197 LBS | SYSTOLIC BLOOD PRESSURE: 120 MMHG | TEMPERATURE: 98.2 F

## 2024-10-14 DIAGNOSIS — I10 PRIMARY HYPERTENSION: ICD-10-CM

## 2024-10-14 DIAGNOSIS — E11.9 TYPE 2 DIABETES MELLITUS WITHOUT COMPLICATION, WITHOUT LONG-TERM CURRENT USE OF INSULIN (MULTI): ICD-10-CM

## 2024-10-14 DIAGNOSIS — E78.2 MIXED HYPERLIPIDEMIA: ICD-10-CM

## 2024-10-14 DIAGNOSIS — Z00.00 ROUTINE GENERAL MEDICAL EXAMINATION AT HEALTH CARE FACILITY: Primary | ICD-10-CM

## 2024-10-14 LAB — POC HEMOGLOBIN A1C: 6.5 % (ref 4.2–6.5)

## 2024-10-14 PROCEDURE — 1159F MED LIST DOCD IN RCRD: CPT | Performed by: INTERNAL MEDICINE

## 2024-10-14 PROCEDURE — 83036 HEMOGLOBIN GLYCOSYLATED A1C: CPT | Performed by: INTERNAL MEDICINE

## 2024-10-14 PROCEDURE — 99214 OFFICE O/P EST MOD 30 MIN: CPT | Performed by: INTERNAL MEDICINE

## 2024-10-14 PROCEDURE — 3074F SYST BP LT 130 MM HG: CPT | Performed by: INTERNAL MEDICINE

## 2024-10-14 PROCEDURE — 3008F BODY MASS INDEX DOCD: CPT | Performed by: INTERNAL MEDICINE

## 2024-10-14 PROCEDURE — 3079F DIAST BP 80-89 MM HG: CPT | Performed by: INTERNAL MEDICINE

## 2024-10-14 PROCEDURE — 1036F TOBACCO NON-USER: CPT | Performed by: INTERNAL MEDICINE

## 2024-10-14 PROCEDURE — 4010F ACE/ARB THERAPY RXD/TAKEN: CPT | Performed by: INTERNAL MEDICINE

## 2024-10-14 PROCEDURE — 1160F RVW MEDS BY RX/DR IN RCRD: CPT | Performed by: INTERNAL MEDICINE

## 2024-10-14 PROCEDURE — 1123F ACP DISCUSS/DSCN MKR DOCD: CPT | Performed by: INTERNAL MEDICINE

## 2024-10-14 PROCEDURE — 1170F FXNL STATUS ASSESSED: CPT | Performed by: INTERNAL MEDICINE

## 2024-10-14 RX ORDER — METFORMIN HYDROCHLORIDE 1000 MG/1
1000 TABLET ORAL
Qty: 180 TABLET | Refills: 3 | Status: SHIPPED | OUTPATIENT
Start: 2024-10-14 | End: 2025-10-14

## 2024-10-14 RX ORDER — LOSARTAN POTASSIUM 100 MG/1
100 TABLET ORAL DAILY
Qty: 90 TABLET | Refills: 3 | Status: SHIPPED | OUTPATIENT
Start: 2024-10-14 | End: 2025-10-14

## 2024-10-14 RX ORDER — METOPROLOL SUCCINATE 50 MG/1
50 TABLET, EXTENDED RELEASE ORAL DAILY
Qty: 90 TABLET | Refills: 3 | Status: SHIPPED | OUTPATIENT
Start: 2024-10-14

## 2024-10-14 RX ORDER — SIMVASTATIN 10 MG/1
10 TABLET, FILM COATED ORAL NIGHTLY
Qty: 90 TABLET | Refills: 3 | Status: SHIPPED | OUTPATIENT
Start: 2024-10-14 | End: 2025-10-14

## 2024-10-14 ASSESSMENT — PATIENT HEALTH QUESTIONNAIRE - PHQ9
SUM OF ALL RESPONSES TO PHQ9 QUESTIONS 1 AND 2: 0
2. FEELING DOWN, DEPRESSED OR HOPELESS: NOT AT ALL
1. LITTLE INTEREST OR PLEASURE IN DOING THINGS: NOT AT ALL

## 2024-10-14 ASSESSMENT — ACTIVITIES OF DAILY LIVING (ADL)
BATHING: INDEPENDENT
DOING_HOUSEWORK: INDEPENDENT
MANAGING_FINANCES: INDEPENDENT
GROCERY_SHOPPING: INDEPENDENT
DRESSING: INDEPENDENT
TAKING_MEDICATION: INDEPENDENT

## 2024-10-14 ASSESSMENT — ENCOUNTER SYMPTOMS
LOSS OF SENSATION IN FEET: 0
OCCASIONAL FEELINGS OF UNSTEADINESS: 0
DEPRESSION: 0

## 2024-10-14 NOTE — PROGRESS NOTES
"Subjective    Parish Muñoz is a 64 y.o. female who presents for Medicare Annual Wellness Visit Subsequent.  HPI    Influenza 10/2/2024  Pcv 9/29/2023  Shingrix 2021    Mamm 6/6/2024  Bone density scheduled.   Colonoscopy scheduled next week  She is feeling good.   No low blood sugars.    Review of Systems   All other systems reviewed and are negative.        Objective     /88 (BP Location: Left arm, Patient Position: Sitting, BP Cuff Size: Adult)   Pulse 68   Temp 36.8 °C (98.2 °F) (Skin)   Resp 14   Ht 1.676 m (5' 6\")   Wt 89.4 kg (197 lb)   LMP  (LMP Unknown)   BMI 31.80 kg/m²    Physical Exam  Constitutional:       Appearance: Normal appearance.   Cardiovascular:      Rate and Rhythm: Normal rate and regular rhythm.      Pulses: Normal pulses.   Pulmonary:      Effort: Pulmonary effort is normal.      Breath sounds: Normal breath sounds.   Chest:      Chest wall: No mass or tenderness.   Breasts:     Right: Normal.      Left: Normal.   Abdominal:      General: Abdomen is flat.   Musculoskeletal:      Cervical back: Neck supple. No tenderness.   Lymphadenopathy:      Cervical: No cervical adenopathy.      Upper Body:      Right upper body: No supraclavicular or axillary adenopathy.      Left upper body: No supraclavicular or axillary adenopathy.   Neurological:      Mental Status: She is alert.   Psychiatric:         Attention and Perception: Attention and perception normal.         Mood and Affect: Mood and affect normal.       Health Maintenance Due   Topic Date Due    Medicare Annual Wellness Visit (AWV)  Never done    MMR Vaccines (1 of 1 - Standard series) Never done    DTaP/Tdap/Td Vaccines (1 - Tdap) Never done    RSV Pregnant patients and/or  patients aged 60+ years (1 - 1-dose 60+ series) Never done    Diabetes: Urine Protein Screening  09/20/2022    Lipid Panel  10/05/2024    Bone Density Scan  10/12/2024          Assessment/Plan   Problem List Items Addressed This Visit       Diabetes " (Multi)    Relevant Medications    metFORMIN (Glucophage) 1,000 mg tablet    Other Relevant Orders    POCT glycosylated hemoglobin (Hb A1C) manually resulted (Completed)    Albumin-Creatinine Ratio, Urine Random    Lipid Panel    HLD (hyperlipidemia)    Relevant Medications    simvastatin (Zocor) 10 mg tablet    Hypertension    Relevant Medications    losartan (Cozaar) 100 mg tablet    metoprolol succinate XL (Toprol-XL) 50 mg 24 hr tablet     Other Visit Diagnoses       Routine general medical examination at health care facility    -  Primary    Relevant Orders    1 Year Follow Up In [facility] - PCP - Wellness Exam    Albumin-Creatinine Ratio, Urine Random    Lipid Panel        Utd with cancer screening.  Diabetes is well controlled. Blood pressure is good.   Check labs and urine  follow up in 6 months. Recommend yearly eye exams and self foot exams. call if any problems or questions.

## 2024-10-18 ENCOUNTER — HOSPITAL ENCOUNTER (OUTPATIENT)
Dept: RADIOLOGY | Facility: HOSPITAL | Age: 64
Discharge: HOME | End: 2024-10-18
Payer: MEDICARE

## 2024-10-18 DIAGNOSIS — Z79.631 ON METHOTREXATE THERAPY: ICD-10-CM

## 2024-10-18 DIAGNOSIS — M85.852 OSTEOPENIA OF LEFT HIP: ICD-10-CM

## 2024-10-18 DIAGNOSIS — Z79.899 LONG-TERM USE OF HYDROXYCHLOROQUINE: ICD-10-CM

## 2024-10-18 DIAGNOSIS — M06.042 RHEUMATOID ARTHRITIS INVOLVING BOTH HANDS WITH NEGATIVE RHEUMATOID FACTOR (MULTI): ICD-10-CM

## 2024-10-18 DIAGNOSIS — D64.9 ANEMIA, UNSPECIFIED TYPE: ICD-10-CM

## 2024-10-18 DIAGNOSIS — M85.80 OSTEOPENIA, UNSPECIFIED LOCATION: ICD-10-CM

## 2024-10-18 DIAGNOSIS — M06.041 RHEUMATOID ARTHRITIS INVOLVING BOTH HANDS WITH NEGATIVE RHEUMATOID FACTOR (MULTI): ICD-10-CM

## 2024-10-18 PROCEDURE — 77080 DXA BONE DENSITY AXIAL: CPT

## 2024-10-23 ENCOUNTER — APPOINTMENT (OUTPATIENT)
Dept: GASTROENTEROLOGY | Facility: EXTERNAL LOCATION | Age: 64
End: 2024-10-23
Payer: MEDICARE

## 2024-10-23 ENCOUNTER — ANESTHESIA (OUTPATIENT)
Dept: GASTROENTEROLOGY | Facility: EXTERNAL LOCATION | Age: 64
End: 2024-10-23

## 2024-10-23 VITALS
HEART RATE: 68 BPM | WEIGHT: 192 LBS | DIASTOLIC BLOOD PRESSURE: 71 MMHG | TEMPERATURE: 97.3 F | RESPIRATION RATE: 18 BRPM | SYSTOLIC BLOOD PRESSURE: 132 MMHG | BODY MASS INDEX: 30.99 KG/M2 | OXYGEN SATURATION: 99 %

## 2024-10-23 DIAGNOSIS — Z12.11 COLON CANCER SCREENING: ICD-10-CM

## 2024-10-23 PROCEDURE — 45385 COLONOSCOPY W/LESION REMOVAL: CPT | Performed by: INTERNAL MEDICINE

## 2024-10-23 RX ORDER — PROPOFOL 10 MG/ML
INJECTION, EMULSION INTRAVENOUS AS NEEDED
Status: DISCONTINUED | OUTPATIENT
Start: 2024-10-23 | End: 2024-10-23

## 2024-10-23 RX ORDER — LIDOCAINE HYDROCHLORIDE 20 MG/ML
INJECTION, SOLUTION INFILTRATION; PERINEURAL AS NEEDED
Status: DISCONTINUED | OUTPATIENT
Start: 2024-10-23 | End: 2024-10-23

## 2024-10-23 RX ORDER — ONDANSETRON HYDROCHLORIDE 2 MG/ML
4 INJECTION, SOLUTION INTRAVENOUS ONCE AS NEEDED
Status: DISCONTINUED | OUTPATIENT
Start: 2024-10-23 | End: 2024-10-24 | Stop reason: HOSPADM

## 2024-10-23 SDOH — HEALTH STABILITY: MENTAL HEALTH: CURRENT SMOKER: 0

## 2024-10-23 ASSESSMENT — COLUMBIA-SUICIDE SEVERITY RATING SCALE - C-SSRS
2. HAVE YOU ACTUALLY HAD ANY THOUGHTS OF KILLING YOURSELF?: NO
6. HAVE YOU EVER DONE ANYTHING, STARTED TO DO ANYTHING, OR PREPARED TO DO ANYTHING TO END YOUR LIFE?: NO
1. IN THE PAST MONTH, HAVE YOU WISHED YOU WERE DEAD OR WISHED YOU COULD GO TO SLEEP AND NOT WAKE UP?: NO

## 2024-10-23 ASSESSMENT — PAIN SCALES - GENERAL
PAINLEVEL_OUTOF10: 0 - NO PAIN
PAINLEVEL_OUTOF10: 0 - NO PAIN
PAIN_LEVEL: 0
PAINLEVEL_OUTOF10: 0 - NO PAIN

## 2024-10-23 ASSESSMENT — PAIN - FUNCTIONAL ASSESSMENT: PAIN_FUNCTIONAL_ASSESSMENT: 0-10

## 2024-10-23 NOTE — DISCHARGE INSTRUCTIONS
Patient Instructions Post Endoscopy Procedure      The anesthetics, sedatives or narcotics which were given to you today will be acting in your body for the next 24 hours, so you might feel a little sleepy or groggy.  This feeling should slowly wear off. Carefully read and follow the instructions.     You received sedation today:  - Do not drive or operate any machinery or power tools of any kind.   - No alcoholic beverages today, not even beer or wine.  - Do not make any important decisions or sign any legal documents.  - No over the counter medications that contain alcohol or that may cause drowsiness.    While it is common to experience mild to moderate abdominal distention, gas, or belching after your procedure, if any of these symptoms occur following discharge from the GI Lab or within one week of having your procedure, call the [facility] to be advised whether a visit to your nearest Urgent Care or Emergency Department is indicated.  Take this paper with you if you go.   - If you develop an allergic reaction to the medications that were given during your procedure such as difficulty breathing, rash, hives, severe nausea, vomiting or lightheadedness.  - If you experience chest pain, shortness of breath, severe abdominal pain, fevers and chills.  -If you develop signs and symptoms of bleeding such as blood in your spit, if your stools turn black, tarry, or bloody  - If you have not urinated within 8 hours following your procedure.  - If your IV site becomes painful, red, inflamed, or looks infected.    If you received a biopsy/polypectomy the following instructions apply below:  _x_ Do not use non-steroidal medications or anti-coagulants for one week following your procedure. (Examples of these types of medications are: Advil, Arthrotec, Aleve, Coumadin, Ecotrin, Heparin, Ibuprofen, Indocin, Motrin, Naprosyn, Nuprin, Plavix, Vioxx, and Voltarin, or their generic forms.  This list is not  all-inclusive.  Check with your physician or pharmacist before resuming medications.)   _x_ Eat a soft diet today.  Avoid foods that are poorly digested for the next 24 hours.  These foods would include: nuts, beans, lettuce, red meats, and fried foods. Start with liquids and advance your diet as tolerated, gradually work up to eating solids.   __ You can restart your ASA tomorrow  __ You can resume your anticoagulation therapy -     Your physician recommends the additional following instructions:    -You have a contact number available for emergencies. The signs and symptoms of potential delayed complications were discussed with you. You may return to normal activities tomorrow.  -Resume your previous diet or other if specified.  -Continue your present medications.   -We are waiting for your pathology results, if applicable. The results will be available in [id number]. I will send you a message with any recommendations.  -The findings and recommendations have been discussed with you and/or family.  -Please see Medication Reconciliation Form for new medication/medications prescribed.     If you experience any problems or have any questions following discharge from the GI Lab, please call: [phone] from 7 am- 4:30 pm.  In the event of an emergency please go to the closest Emergency Department or call Dr. Briggs at [phone]

## 2024-10-23 NOTE — ANESTHESIA PREPROCEDURE EVALUATION
Patient: Parish Muñoz    Procedure Information       Date/Time: 10/23/24 1230    Scheduled providers: Janine GEE MD; CLIFF Obrien-CRNA    Procedure: COLONOSCOPY    Location: [facility]            Relevant Problems   Cardiac   (+) HLD (hyperlipidemia)   (+) Hypertension      Pulmonary   (+) Asthma      Endocrine   (+) Diabetes (Multi)      Hematology   (+) Anemia      Musculoskeletal   (+) Rheumatoid arthritis      HEENT   (+) Seasonal allergies      Genitourinary   (+) Stage 3a chronic kidney disease (Multi)       Clinical information reviewed:    Allergies  Meds     OB Status           NPO Detail:  NPO/Void Status  Date of Last Liquid: 10/23/24  Time of Last Liquid: 0800  Date of Last Solid: 10/21/24  Last Intake Type: Clear fluids         Physical Exam    Airway  Mallampati: III  TM distance: <3 FB  Neck ROM: full     Cardiovascular - normal exam     Dental - normal exam     Pulmonary - normal exam     Abdominal   (+) obese         Anesthesia Plan    History of general anesthesia?: yes  History of complications of general anesthesia?: no    ASA 3     MAC   (Patient positioned self to comfort prior to sedation administered; eyes closed; continuous monitoring.  Preoxygenated 2L prior to procedure.)  The patient is not a current smoker.    intravenous induction   Anesthetic plan and risks discussed with patient.    Plan discussed with CRNA.

## 2024-10-23 NOTE — ANESTHESIA POSTPROCEDURE EVALUATION
Patient: Parish Muñoz    Procedure Summary       Date: 10/23/24 Room / Location: [facility]    Anesthesia Start: 1225 Anesthesia Stop:     Procedure: COLONOSCOPY Diagnosis: Colon cancer screening    Scheduled Providers: Janine GEE MD; RASHAD Obrien Responsible Provider: RASHAD Obrien    Anesthesia Type: MAC ASA Status: 3            Anesthesia Type: MAC    Vitals Value Taken Time   /69 10/23/24 1249   Temp 36.7 10/23/24 1249   Pulse 68 10/23/24 1249   Resp 17 10/23/24 1249   SpO2 100 10/23/24 1249       Anesthesia Post Evaluation    Patient location during evaluation: bedside  Patient participation: complete - patient participated  Level of consciousness: awake  Pain score: 0  Pain management: adequate  Airway patency: patent  Cardiovascular status: acceptable  Respiratory status: acceptable and room air  Hydration status: acceptable  Postoperative Nausea and Vomiting: none      No notable events documented.

## 2024-10-23 NOTE — H&P
Outpatient Hospital Procedure    Patient Profile-Procedures  Initial Info  Patient Demographics  Name Parish Muñoz  Date of Birth 1960  MRN [id number]  Address   [address] [facility] [id number] [facility] [id number]    Primary Phone Number [phone]  Secondary Phone Number    PCP Caroline Welch    Procedures   Colonoscopy      Indication:  surveillance    Primary contact name and number   Extended Emergency Contact Information  Primary Emergency Contact: Ruby Peace  Address: [address] [place], [place] [id number] [facility]  Home Phone: [phone]  Relation: Sibling    General Health  Weight   Vitals:    10/23/24 1203   Weight: 87.1 kg (192 lb)     BMI Body mass index is 30.99 kg/m².    Allergies  Allergies   Allergen Reactions    Esomeprazole Other    Naproxen Sodium Other    Shellfish Derived Other    [place] Rash       Past Medical History   Past Medical History:   Diagnosis Date    Asthma     Diabetes (Multi)     H/O rheumatoid arthritis     Hyperlipidemia     Hypertension     Seasonal allergies        Provider assessment  Diagnosis  Medication Reviewed - yes  Prior to Admission medications    Medication Sig Start Date End Date Taking? Authorizing Provider   ascorbic acid (Vitamin C) 1,000 mg tablet Take by mouth. 7/27/20  Yes Historical Provider, MD   aspirin 81 mg chewable tablet Chew. 7/27/20  Yes Historical Provider, MD   calcium-D3-zinc-copper-babar 325 mg-12.5 mcg -2.75 mg tablet Take by mouth. 7/27/20  Yes Historical Provider, MD   cetirizine (ZyrTEC) 10 mg tablet Take 1 tablet (10 mg) by mouth once daily. 7/27/20  Yes Historical Provider, MD   cholecalciferol (Vitamin D-3) 25 MCG (1000 UT) tablet Take by mouth. 7/27/20  Yes Historical Provider, MD   cranberry extract 200 mg capsule Take by mouth. 7/27/20  Yes Historical Provider, MD   cyanocobalamin, vitamin B-12, (Vitamin B-12) 1,000 mcg tablet extended release Take 1 tablet  (1,000 mcg) by mouth once daily. 5/13/21  Yes Historical Provider, MD   ferrous sulfate 325 (65 Fe) MG EC tablet Take 65 mg by mouth once daily with breakfast. Do not crush, chew, or split.   Yes Historical Provider, MD   fish oil concentrate (Omega-3) 120-180 mg capsule Take by mouth. 7/27/20  Yes Historical Provider, MD   hydroxychloroquine (Plaquenil) 200 mg tablet Take 2 tablets (400 mg) by mouth once daily. 9/30/24 3/29/25 Yes Aida Riley MD   hydrOXYzine HCL (Atarax) 25 mg tablet Take 1 tablet (25 mg) by mouth once daily at bedtime. 9/30/24  Yes Wendi Buckley,    lansoprazole (Prevacid) 15 mg DR capsule Take 1 capsule (15 mg) by mouth once daily in the morning. Take before meals. 7/27/20  Yes Historical Provider, MD   losartan (Cozaar) 100 mg tablet Take 1 tablet (100 mg) by mouth once daily. 10/14/24 10/14/25 Yes Caroline Tovar DO   metFORMIN (Glucophage) 1,000 mg tablet Take 1 tablet (1,000 mg) by mouth 2 times daily (morning and late afternoon). 10/14/24 10/14/25 Yes Caroline Tovar DO   methotrexate (Trexall) 2.5 mg tablet Take 8 tablets (20 mg total) by mouth 1 (one) time per week. 10/8/24 3/25/25 Yes Aida Riley MD   metoprolol succinate XL (Toprol-XL) 50 mg 24 hr tablet Take 1 tablet (50 mg) by mouth once daily. 10/14/24  Yes Caroline Tovar DO   mometasone (Asmanex Twisthaler) 220 mcg/ actuation (60) aerosol powdr breath activated Inhale 1 puff 2 times a day. 2/6/24 2/5/25 Yes Caroline Tovar DO   simvastatin (Zocor) 10 mg tablet Take 1 tablet (10 mg) by mouth once daily at bedtime. 10/14/24 10/14/25 Yes Caroline Tovar DO   levalbuterol (Xopenex) 45 mcg/actuation inhaler Inhale 1 puff every 4 hours if needed. 7/27/20   Historical Provider, MD       This is my H&P    Physical Exam  Physical Exam  Constitutional:       Comments: Awake   HENT:      Head: Normocephalic.   Cardiovascular:      Rate and Rhythm: Normal rate and regular rhythm.   Pulmonary:       Effort: Pulmonary effort is normal.      Breath sounds: Normal breath sounds.   Abdominal:      General: Bowel sounds are normal.      Palpations: Abdomen is soft.   Neurological:      Mental Status: She is alert.   Psychiatric:         Mood and Affect: Mood normal.           Oropharyngeal Classification II (hard and soft palate, upper portion of tonsils and uvula visible)  ASA PS Classification 2  Sedation Plan Deep  Procedure Plan - pre-procedural (re)assesment completed by physician:  discharge/transfer patient when discharge criteria met    Janine V MD [place]  10/23/2024 12:20 PM

## 2024-10-24 NOTE — PROGRESS NOTES
Subjective   Patient ID: [id number]   Parish Muñoz is a 64 y.o. female with Osteoporosis with vertebral fx, seronegative RA, asthma, obesity (BMI 33< 35), HLD, HTN, and DM, presents for follow up     Current rheum meds:  - HCQ 400mg daily  - MTX 20mg (8 tabs) weekly (on Fridays), folic acid   - Vitamin D/Citracal daily    Prior rheum meds:  - Alendronate for 2 years (6121-6999), stopped by her provider as she had improvement in BMD    HPI  Last DEXA:  DEXA 10/24:    LEFT FEMUR - TOTAL BMDDensity: 1.137 grams/centimeter squared T-Score 1.0  LEFT FEMUR - NECk BMD: 1.043 grams/centimeter squared T-Score 0.0  SPINE L1-L4 BMD: 1.217 g per cm squared T-Score 0.3    10-year Fracture Risk:  Major Osteoporotic Fracture  8.3%  Hip Fracture                        0.3%    Colonoscopy -> diverticulosis, hemorrhoids, polyp ( tubular adenoma)  Using cornea care for her eyes, helping a lot with the dryness   Rash on her face, saw derm, has seborrheic dermatitis, using topicals   Eye exam in August of this year, doing well to return in 1 year  Had some swelling in the left eye related to DM, referred to a retina specialist, no tx, follow up in 1 year   Patient lost weight and had a drop in her A1c  Around 10 minutes of morning stiffness   Wears arthritis gloves to bed, helps keep them warm and no pain, less in the warm weather   The patient is doing well  Complaints with med  + dry eyes   No side effects reported  No rashes  No infections  No eye inflammation  No chest pain or dyspnea   No fatigue  No hematochezia or melena    History of falls / fractures: no  Loss of height: no  Tobacco: no  Alcohol: no  Vitamin D supplementation: yes  Calcium supplementation: yes  Weight bearing exercise: yes  Previous or planned invasive jaw surgery: no  History of radiation: no  Chronic steroid use: no  History of RA: yes  GERD: yes  Dillan-en-y: no  CKD / GFR <30: no  Parental hip fracture: no    ROS:  As per HPI     Rheum hx (Recall from   Tony's notes):  At age of 52, presented with complaints of bilateral hand stiffness in morning with associated pain and intermittent joint swelling. Lab testing was negative but felt to have seronegative RA. She was started on MTX and tapered up to 20mg weekly with addition of HCQ by Dr. Ramsey.      Current treatment:HCQ 400mg daily, MTX 20mg (8 tabs) weekly (on Fridays), folic acid   No side effects reported from meds. Gets annual eye examination- last done 8/2/2022     Osteoporosis : Traumatic fall from 2 step ladder resulted in vertebral fracture in 2018 (was in [place] at the time). Took Alendronate for 2 years (1941-4509), stopped by her provider as she had improvement in BMD. She takes daily vitamin D/Citracal. No systemic steroid use. Menopause at age of 52-53. No hx of radiation. Sees dentist regularly.     PSH: back surgery (for herniated disc)  Social hx: Smoking: none, ETOH: none, Recreational drugs: none  FMH: none for rheumatic disease, she has no kids. Lives by herself,  passed away in 2018. Her sister lives next door    Patient Active Problem List   Diagnosis    Anemia    Asthma    Diabetes (Multi)    Dry eye    HLD (hyperlipidemia)    Hypertension    Long term methotrexate user    Osteoporosis    Rheumatoid arthritis    Seasonal allergies    Stage 3a chronic kidney disease (Multi)    Vitamin B12 deficiency      Past Medical History:   Diagnosis Date    Asthma     Diabetes (Multi)     H/O rheumatoid arthritis     Hyperlipidemia     Hypertension     Seasonal allergies       Past Surgical History:   Procedure Laterality Date    OTHER SURGICAL HISTORY  07/27/2020    Back surgery    OTHER SURGICAL HISTORY  07/27/2020    Lipoma excision    OTHER SURGICAL HISTORY  07/27/2020    Tonsillectomy with adenoidectomy      Social History     Socioeconomic History    Marital status:      Spouse name: Not on file    Number of children: Not on file    Years of education: Not on file    Highest  education level: Not on file   Occupational History    Not on file   Tobacco Use    Smoking status: Never    Smokeless tobacco: Never   Vaping Use    Vaping status: Never Used   Substance and Sexual Activity    Alcohol use: Never    Drug use: Never    Sexual activity: Not on file   Other Topics Concern    Not on file   Social History Narrative    Not on file     Social Drivers of Health     Financial Resource Strain: Not on file   Food Insecurity: Not on file   Transportation Needs: Not on file   Physical Activity: Not on file   Stress: Not on file   Social Connections: Not on file   Intimate Partner Violence: Not on file   Housing Stability: Not on file     Allergies   Allergen Reactions    Esomeprazole Other    Naproxen Sodium Other    Shellfish Derived Other    [place] Rash     Current Outpatient Medications:     ascorbic acid (Vitamin C) 1,000 mg tablet, Take by mouth., Disp: , Rfl:     aspirin 81 mg chewable tablet, Chew., Disp: , Rfl:     calcium-D3-zinc-copper-babar 325 mg-12.5 mcg -2.75 mg tablet, Take by mouth., Disp: , Rfl:     cetirizine (ZyrTEC) 10 mg tablet, Take 1 tablet (10 mg) by mouth once daily., Disp: , Rfl:     cholecalciferol (Vitamin D-3) 25 MCG (1000 UT) tablet, Take by mouth., Disp: , Rfl:     cranberry extract 200 mg capsule, Take by mouth., Disp: , Rfl:     cyanocobalamin, vitamin B-12, (Vitamin B-12) 1,000 mcg tablet extended release, Take 1 tablet (1,000 mcg) by mouth once daily., Disp: , Rfl:     ferrous sulfate 325 (65 Fe) MG EC tablet, Take 65 mg by mouth once daily with breakfast. Do not crush, chew, or split., Disp: , Rfl:     fish oil concentrate (Omega-3) 120-180 mg capsule, Take by mouth., Disp: , Rfl:     hydroxychloroquine (Plaquenil) 200 mg tablet, Take 2 tablets (400 mg) by mouth once daily., Disp: 180 tablet, Rfl: 1    hydrOXYzine HCL (Atarax) 25 mg tablet, Take 1 tablet (25 mg) by mouth once daily at bedtime., Disp: 90 tablet, Rfl: 3    lansoprazole (Prevacid) 15 mg DR  capsule, Take 1 capsule (15 mg) by mouth once daily in the morning. Take before meals., Disp: , Rfl:     levalbuterol (Xopenex) 45 mcg/actuation inhaler, Inhale 1 puff every 4 hours if needed., Disp: , Rfl:     losartan (Cozaar) 100 mg tablet, Take 1 tablet (100 mg) by mouth once daily., Disp: 90 tablet, Rfl: 3    metFORMIN (Glucophage) 1,000 mg tablet, Take 1 tablet (1,000 mg) by mouth 2 times daily (morning and late afternoon)., Disp: 180 tablet, Rfl: 3    methotrexate (Trexall) 2.5 mg tablet, Take 8 tablets (20 mg total) by mouth 1 (one) time per week., Disp: 96 tablet, Rfl: 1    metoprolol succinate XL (Toprol-XL) 50 mg 24 hr tablet, Take 1 tablet (50 mg) by mouth once daily., Disp: 90 tablet, Rfl: 3    mometasone (Asmanex Twisthaler) 220 mcg/ actuation (60) aerosol powdr breath activated, Inhale 1 puff 2 times a day., Disp: 3 each, Rfl: 3    simvastatin (Zocor) 10 mg tablet, Take 1 tablet (10 mg) by mouth once daily at bedtime., Disp: 90 tablet, Rfl: 3     Objective   Visit Vitals  BP (!) 164/92   Pulse 88   Temp 36.3 °C (97.4 °F)   Resp 20     Physical Exam:  General: AAOx3, Cooperative  Skin: No rashes, ulcers or photosensitive areas  MSK: Upper Extremities:  Hand/Fingers: No erythema, edema, tenderness or warmth at DIP, PIP, or MCP joints, FROM grossly. Good hand . No nodules. No deformities   Wrists: No erythema, edema, warmth or tenderness at wrist, FROM grossly  Elbows: No tenderness, edema, erythema or warmth at elbows, FROM grossly. No nodules   Shoulders: No edema, erythema, tenderness or warmth at shoulders. FROM  Lower Extremities:   Hips: No obvious deformities. No joint tenderness, normal ROM grossly. No trochanteric bursae TTP  Knees: No tenderness, deformities, edema, rashes, or warmth, normal ROM grossly. No crepitus, no pes anserine bursa TTP   Ankles, feet: No deformities, tenderness, edema, erythema, ulceration, or warmth at the ankle or MTP/IP joints, normal ROM grossly  Spine: No spinal  tenderness to palpation. No SI joint tenderness    Lab Results   Component Value Date    WBC 4.1 (L) 10/28/2024    HGB 13.1 10/28/2024    HCT 41.6 10/28/2024    MCV 91 10/28/2024     10/28/2024        Chemistry    Lab Results   Component Value Date/Time     10/28/2024 1045    K 4.4 10/28/2024 1045     10/28/2024 1045    CO2 30 10/28/2024 1045    BUN 16 10/28/2024 1045    CREATININE 1.01 10/28/2024 1045    Lab Results   Component Value Date/Time    CALCIUM 10.4 10/28/2024 1045    ALKPHOS 46 10/28/2024 1045    AST 34 10/28/2024 1045    ALT 28 10/28/2024 1045    BILITOT 0.9 10/28/2024 1045        Lab Results   Component Value Date    ALT 28 10/28/2024    AST 34 10/28/2024    ALKPHOS 46 10/28/2024    BILITOT 0.9 10/28/2024      Lab Results   Component Value Date    CALCIUM 10.4 10/28/2024      Lab Results   Component Value Date    SPEP NORMAL 12/14/2022      EGD biopsies in 8/2023:  A.  2ND PORTION DUODENUM:    -- SMALL BOWEL MUCOSA WITH NO SIGNIFICANT PATHOLOGIC CHANGE.   -- NO MORPHOLOGIC EVIDENCE OF CELIAC DISEASE.     B.  GASTRITIS:    -- GASTRIC ANTRAL-TYPE MUCOSA WITH MILD REACTIVE EPITHELIAL CHANGE AND FOCAL   GOBLET CELL METAPLASIA.   -- NO HELICOBACTER PYLORI IDENTIFIED ON ROUTINE STAINED SLIDES.     DEXA 10/12/22:    LEFT FEMUR - TOTAL BMD: 1.144 g/cm2 T-score : 1.1    LEFT FEMUR - NECk BMD: 0.994 g/cm2 T-score : -0.3   SPINE L1-L4 BMD 1.223 g/cm2 T-score : 0.4    DEXA 10/24:    LEFT FEMUR - TOTAL BMD: 1.137 grams/centimeter squared T-Score 1.0  LEFT FEMUR - NECk BMD: 1.043 grams/centimeter squared T-Score 0.0  SPINE L1-L4 BMD: 1.217 g per cm squared T-Score 0.3    10-year Fracture Risk:  Major Osteoporotic Fracture  8.3%  Hip Fracture                        0.3%      Assessment/Plan    This is a 65 yo F with osteoporosis with vertebral fx (now normal BMD) and seronegative RA, presents for follow up  Last seen in 5/24     Labs:  10/24: WBCs 4.1K, ALC 1.01, rest of CBC, CMP, ESR, CRP, vitamin  D wnl  4/24: CBC, CMP, CRP, ESR, Vitamin D wnl  1/24: iron studies low -> normal, Hb 11.7, rest of CBC, TSH wnl  10/23:  AST 42, Na 146, WBC 4.3, Hg 10.8, hep C neg  12/22: SPEP no monoclonal gammopathy, Vitamin D 67.   8/3/22: RF< 10, CCP <1, CRP 0.28, SSA/SSB negative ESR 6 (always normal)     Imaging:  Refer to above DEXA    # Seronegative RA, well controlled. No extra-articular manifestations. No active joints. CDAI 1  # Long term use of HCQ  # Long term use of Methotrexate   # OP, vertebral fx in 2018. Prior tx: Alendronate for 2 years (2963-5453). Repeat DEXA 10/24 with normal BMD. Monitoring off tx  # Anemia, resolved     - Continue HCQ 400mg daily, refilled  - Continue methotrexate, refilled   - Labs before next mari (CBC, CMP, ESR, CPR)  - Follow up with ophtho, last mari in 8/24, retina is good   - Vaccines UTD including influenza, 4 doses of COVID 19, pna in 2017, shingrex 2021  - Continue Vitamin D, make it 1 pill every other day (level is 80)  - Continue calcium  - Weight bearing exercise as tolerated  - Repeat DEXA due in 10/27    RTC in 6 months     Plan, including risks and benefits, was discussed with the patient, informed on how to reach us.     To schedule an appointment, call [phone]  To reach the rheumatology office, call [phone]    Aida Riley MD   Division of Rheumatology  [facility]

## 2024-10-28 ENCOUNTER — LAB (OUTPATIENT)
Dept: LAB | Facility: LAB | Age: 64
End: 2024-10-28
Payer: MEDICARE

## 2024-10-28 DIAGNOSIS — Z79.631 ON METHOTREXATE THERAPY: ICD-10-CM

## 2024-10-28 DIAGNOSIS — M06.042 RHEUMATOID ARTHRITIS INVOLVING BOTH HANDS WITH NEGATIVE RHEUMATOID FACTOR (MULTI): ICD-10-CM

## 2024-10-28 DIAGNOSIS — D64.9 ANEMIA, UNSPECIFIED TYPE: ICD-10-CM

## 2024-10-28 DIAGNOSIS — Z79.899 LONG-TERM USE OF HYDROXYCHLOROQUINE: ICD-10-CM

## 2024-10-28 DIAGNOSIS — M06.041 RHEUMATOID ARTHRITIS INVOLVING BOTH HANDS WITH NEGATIVE RHEUMATOID FACTOR (MULTI): ICD-10-CM

## 2024-10-28 DIAGNOSIS — Z00.00 ROUTINE GENERAL MEDICAL EXAMINATION AT HEALTH CARE FACILITY: ICD-10-CM

## 2024-10-28 DIAGNOSIS — E11.9 TYPE 2 DIABETES MELLITUS WITHOUT COMPLICATION, WITHOUT LONG-TERM CURRENT USE OF INSULIN (MULTI): ICD-10-CM

## 2024-10-28 DIAGNOSIS — M85.80 OSTEOPENIA, UNSPECIFIED LOCATION: ICD-10-CM

## 2024-10-28 LAB
25(OH)D3 SERPL-MCNC: 87 NG/ML (ref 30–100)
ALBUMIN SERPL BCP-MCNC: 4.4 G/DL (ref 3.4–5)
ALP SERPL-CCNC: 46 U/L (ref 33–136)
ALT SERPL W P-5'-P-CCNC: 28 U/L (ref 7–45)
ANION GAP SERPL CALC-SCNC: 10 MMOL/L (ref 10–20)
AST SERPL W P-5'-P-CCNC: 34 U/L (ref 9–39)
BASOPHILS # BLD AUTO: 0.03 X10*3/UL (ref 0–0.1)
BASOPHILS NFR BLD AUTO: 0.7 %
BILIRUB SERPL-MCNC: 0.9 MG/DL (ref 0–1.2)
BUN SERPL-MCNC: 16 MG/DL (ref 6–23)
CALCIUM SERPL-MCNC: 10.4 MG/DL (ref 8.6–10.6)
CHLORIDE SERPL-SCNC: 103 MMOL/L (ref 98–107)
CHOLEST SERPL-MCNC: 128 MG/DL (ref 0–199)
CHOLESTEROL/HDL RATIO: 2.1
CO2 SERPL-SCNC: 30 MMOL/L (ref 21–32)
CREAT SERPL-MCNC: 1.01 MG/DL (ref 0.5–1.05)
CREAT UR-MCNC: 148.2 MG/DL (ref 20–320)
CRP SERPL-MCNC: 0.19 MG/DL
EGFRCR SERPLBLD CKD-EPI 2021: 62 ML/MIN/1.73M*2
EOSINOPHIL # BLD AUTO: 0.03 X10*3/UL (ref 0–0.7)
EOSINOPHIL NFR BLD AUTO: 0.7 %
ERYTHROCYTE [DISTWIDTH] IN BLOOD BY AUTOMATED COUNT: 17.3 % (ref 11.5–14.5)
ERYTHROCYTE [SEDIMENTATION RATE] IN BLOOD BY WESTERGREN METHOD: 11 MM/H (ref 0–30)
GLUCOSE SERPL-MCNC: 144 MG/DL (ref 74–99)
HCT VFR BLD AUTO: 41.6 % (ref 36–46)
HDLC SERPL-MCNC: 61.5 MG/DL
HGB BLD-MCNC: 13.1 G/DL (ref 12–16)
IMM GRANULOCYTES # BLD AUTO: 0.01 X10*3/UL (ref 0–0.7)
IMM GRANULOCYTES NFR BLD AUTO: 0.2 % (ref 0–0.9)
LDLC SERPL CALC-MCNC: 45 MG/DL
LYMPHOCYTES # BLD AUTO: 1.01 X10*3/UL (ref 1.2–4.8)
LYMPHOCYTES NFR BLD AUTO: 24.8 %
MCH RBC QN AUTO: 28.7 PG (ref 26–34)
MCHC RBC AUTO-ENTMCNC: 31.5 G/DL (ref 32–36)
MCV RBC AUTO: 91 FL (ref 80–100)
MICROALBUMIN UR-MCNC: 14.6 MG/L
MICROALBUMIN/CREAT UR: 9.9 UG/MG CREAT
MONOCYTES # BLD AUTO: 0.37 X10*3/UL (ref 0.1–1)
MONOCYTES NFR BLD AUTO: 9.1 %
NEUTROPHILS # BLD AUTO: 2.63 X10*3/UL (ref 1.2–7.7)
NEUTROPHILS NFR BLD AUTO: 64.5 %
NON HDL CHOLESTEROL: 67 MG/DL (ref 0–149)
NRBC BLD-RTO: 0 /100 WBCS (ref 0–0)
PLATELET # BLD AUTO: 297 X10*3/UL (ref 150–450)
POTASSIUM SERPL-SCNC: 4.4 MMOL/L (ref 3.5–5.3)
PROT SERPL-MCNC: 6.8 G/DL (ref 6.4–8.2)
RBC # BLD AUTO: 4.56 X10*6/UL (ref 4–5.2)
SODIUM SERPL-SCNC: 139 MMOL/L (ref 136–145)
TRIGL SERPL-MCNC: 106 MG/DL (ref 0–149)
VLDL: 21 MG/DL (ref 0–40)
WBC # BLD AUTO: 4.1 X10*3/UL (ref 4.4–11.3)

## 2024-10-28 PROCEDURE — 85652 RBC SED RATE AUTOMATED: CPT

## 2024-10-28 PROCEDURE — 36415 COLL VENOUS BLD VENIPUNCTURE: CPT

## 2024-10-28 PROCEDURE — 82570 ASSAY OF URINE CREATININE: CPT

## 2024-10-28 PROCEDURE — 80053 COMPREHEN METABOLIC PANEL: CPT

## 2024-10-28 PROCEDURE — 80061 LIPID PANEL: CPT

## 2024-10-28 PROCEDURE — 82306 VITAMIN D 25 HYDROXY: CPT

## 2024-10-28 PROCEDURE — 85025 COMPLETE CBC W/AUTO DIFF WBC: CPT

## 2024-10-28 PROCEDURE — 82043 UR ALBUMIN QUANTITATIVE: CPT

## 2024-10-28 PROCEDURE — 86140 C-REACTIVE PROTEIN: CPT

## 2024-10-29 LAB
LABORATORY COMMENT REPORT: NORMAL
PATH REPORT.FINAL DX SPEC: NORMAL
PATH REPORT.GROSS SPEC: NORMAL
PATH REPORT.TOTAL CANCER: NORMAL

## 2024-11-04 ENCOUNTER — APPOINTMENT (OUTPATIENT)
Dept: RHEUMATOLOGY | Facility: CLINIC | Age: 64
End: 2024-11-04
Payer: MEDICARE

## 2024-11-04 VITALS
WEIGHT: 194 LBS | TEMPERATURE: 97.4 F | DIASTOLIC BLOOD PRESSURE: 92 MMHG | SYSTOLIC BLOOD PRESSURE: 164 MMHG | HEART RATE: 88 BPM | RESPIRATION RATE: 20 BRPM | BODY MASS INDEX: 31.31 KG/M2

## 2024-11-04 DIAGNOSIS — D64.9 ANEMIA, UNSPECIFIED TYPE: ICD-10-CM

## 2024-11-04 DIAGNOSIS — M06.041 RHEUMATOID ARTHRITIS INVOLVING BOTH HANDS WITH NEGATIVE RHEUMATOID FACTOR (MULTI): Primary | ICD-10-CM

## 2024-11-04 DIAGNOSIS — M85.80 OSTEOPENIA, UNSPECIFIED LOCATION: ICD-10-CM

## 2024-11-04 DIAGNOSIS — M06.042 RHEUMATOID ARTHRITIS INVOLVING BOTH HANDS WITH NEGATIVE RHEUMATOID FACTOR (MULTI): Primary | ICD-10-CM

## 2024-11-04 DIAGNOSIS — Z79.631 ON METHOTREXATE THERAPY: ICD-10-CM

## 2024-11-04 DIAGNOSIS — Z79.899 LONG-TERM USE OF HYDROXYCHLOROQUINE: ICD-10-CM

## 2024-11-04 PROCEDURE — 99214 OFFICE O/P EST MOD 30 MIN: CPT | Performed by: STUDENT IN AN ORGANIZED HEALTH CARE EDUCATION/TRAINING PROGRAM

## 2024-12-26 ENCOUNTER — OFFICE VISIT (OUTPATIENT)
Dept: PRIMARY CARE | Facility: CLINIC | Age: 64
End: 2024-12-26
Payer: MEDICARE

## 2024-12-26 VITALS
SYSTOLIC BLOOD PRESSURE: 122 MMHG | OXYGEN SATURATION: 99 % | RESPIRATION RATE: 14 BRPM | TEMPERATURE: 97.6 F | HEIGHT: 66 IN | HEART RATE: 78 BPM | WEIGHT: 198 LBS | DIASTOLIC BLOOD PRESSURE: 80 MMHG | BODY MASS INDEX: 31.82 KG/M2

## 2024-12-26 DIAGNOSIS — M25.552 PAIN OF LEFT HIP: ICD-10-CM

## 2024-12-26 DIAGNOSIS — J45.20 MILD INTERMITTENT ASTHMA, UNSPECIFIED WHETHER COMPLICATED (HHS-HCC): Primary | ICD-10-CM

## 2024-12-26 DIAGNOSIS — M79.672 LEFT FOOT PAIN: ICD-10-CM

## 2024-12-26 DIAGNOSIS — M54.32 SCIATICA OF LEFT SIDE: ICD-10-CM

## 2024-12-26 PROCEDURE — 3061F NEG MICROALBUMINURIA REV: CPT | Performed by: INTERNAL MEDICINE

## 2024-12-26 PROCEDURE — 1123F ACP DISCUSS/DSCN MKR DOCD: CPT | Performed by: INTERNAL MEDICINE

## 2024-12-26 PROCEDURE — 3048F LDL-C <100 MG/DL: CPT | Performed by: INTERNAL MEDICINE

## 2024-12-26 PROCEDURE — 1160F RVW MEDS BY RX/DR IN RCRD: CPT | Performed by: INTERNAL MEDICINE

## 2024-12-26 PROCEDURE — 3079F DIAST BP 80-89 MM HG: CPT | Performed by: INTERNAL MEDICINE

## 2024-12-26 PROCEDURE — 1159F MED LIST DOCD IN RCRD: CPT | Performed by: INTERNAL MEDICINE

## 2024-12-26 PROCEDURE — 1036F TOBACCO NON-USER: CPT | Performed by: INTERNAL MEDICINE

## 2024-12-26 PROCEDURE — 4010F ACE/ARB THERAPY RXD/TAKEN: CPT | Performed by: INTERNAL MEDICINE

## 2024-12-26 PROCEDURE — 3008F BODY MASS INDEX DOCD: CPT | Performed by: INTERNAL MEDICINE

## 2024-12-26 PROCEDURE — 99214 OFFICE O/P EST MOD 30 MIN: CPT | Performed by: INTERNAL MEDICINE

## 2024-12-26 PROCEDURE — 3074F SYST BP LT 130 MM HG: CPT | Performed by: INTERNAL MEDICINE

## 2024-12-26 RX ORDER — FOLIC ACID 1 MG/1
0.4 TABLET ORAL DAILY
COMMUNITY

## 2024-12-26 NOTE — PROGRESS NOTES
"Subjective    Parish Muñoz is a 64 y.o. female who presents for Foot Problem.  HPI    Reports redness on left foot  Denies pain, swelling, itchy  Constant over the last few months.     Also reports left hip pain. Not back.   Intermittently will radiate into leg.   Needs to see allergist. Her meds are not being covered    Review of Systems   All other systems reviewed and are negative.        Objective     /80 (BP Location: Left arm, Patient Position: Sitting, BP Cuff Size: Adult)   Pulse 78   Temp 36.4 °C (97.6 °F) (Skin)   Resp 14   Ht 1.676 m (5' 6\")   Wt 89.8 kg (198 lb)   LMP  (LMP Unknown)   SpO2 99%   BMI 31.96 kg/m²    Physical Exam  Vitals reviewed.   Constitutional:       General: She is not in acute distress.     Appearance: Normal appearance.   Cardiovascular:      Rate and Rhythm: Normal rate and regular rhythm.      Pulses: Normal pulses.      Heart sounds: Normal heart sounds.   Pulmonary:      Effort: Pulmonary effort is normal.      Breath sounds: Normal breath sounds.   Abdominal:      Tenderness: There is no abdominal tenderness.   Musculoskeletal:         General: No swelling.      Lumbar back: Tenderness present.      Comments: There is mild low back pain and pain with external rotation of left hip    Her foot has good pulses. Redness at heal. Not tenderness   Skin:     General: Skin is warm and dry.   Neurological:      Mental Status: She is alert.       Health Maintenance Due   Topic Date Due    MMR Vaccines (1 of 1 - Standard series) Never done    DTaP/Tdap/Td Vaccines (1 - Tdap) Never done    RSV High Risk: (Elderly (60+) or Pregnant Population) (1 - Risk 60-74 years 1-dose series) Never done    Medicare Annual Wellness Visit (AWV)  09/30/2024    Diabetes: Hemoglobin A1C  01/14/2025          Assessment/Plan   Problem List Items Addressed This Visit       Asthma - Primary    Relevant Orders    Referral to Allergy     Other Visit Diagnoses       Pain of left hip        " Relevant Orders    XR lumbar spine 2-3 views    Sciatica of left side        Relevant Orders    XR hip left with pelvis when performed 2 or 3 views    XR lumbar spine 2-3 views    Referral to Physical Therapy    Left foot pain        Relevant Orders    Referral to Podiatry        Check xrays and refer to physical therapy    She has mild localized numbness anterior ankle and some redness of heel. Refer to podiatry    Refer to [facility].  Call  with any problems or questions.   Follow up for wellness

## 2024-12-27 ENCOUNTER — HOSPITAL ENCOUNTER (OUTPATIENT)
Dept: RADIOLOGY | Facility: CLINIC | Age: 64
Discharge: HOME | End: 2024-12-27
Payer: MEDICARE

## 2024-12-27 DIAGNOSIS — M54.32 SCIATICA OF LEFT SIDE: ICD-10-CM

## 2024-12-27 DIAGNOSIS — M25.552 PAIN OF LEFT HIP: ICD-10-CM

## 2024-12-27 PROCEDURE — 73502 X-RAY EXAM HIP UNI 2-3 VIEWS: CPT | Mod: LT

## 2024-12-27 PROCEDURE — 72100 X-RAY EXAM L-S SPINE 2/3 VWS: CPT

## 2024-12-30 ENCOUNTER — TELEPHONE (OUTPATIENT)
Dept: PRIMARY CARE | Facility: CLINIC | Age: 64
End: 2024-12-30
Payer: MEDICARE

## 2024-12-30 NOTE — TELEPHONE ENCOUNTER
----- Message from Caroline Tovar sent at 12/30/2024 12:49 PM EST -----  Her xrays shows compression fractures of Lumbar spine 1 and 2. Has she had any falls?  I would like to see her in follow up either this week or next

## 2024-12-30 NOTE — TELEPHONE ENCOUNTER
Spoke with patient, she is aware of results.  she stated she hasn't fallen for 8 years.   It did show a small fracture somewhere and was told heal on it's own.   ----  Can you please call patient back to get her scheduled for an appointment either this week or next?

## 2025-01-02 ENCOUNTER — APPOINTMENT (OUTPATIENT)
Dept: PRIMARY CARE | Facility: CLINIC | Age: 65
End: 2025-01-02
Payer: MEDICARE

## 2025-01-07 ENCOUNTER — APPOINTMENT (OUTPATIENT)
Dept: PRIMARY CARE | Facility: CLINIC | Age: 65
End: 2025-01-07
Payer: MEDICARE

## 2025-01-07 VITALS
DIASTOLIC BLOOD PRESSURE: 80 MMHG | TEMPERATURE: 97.4 F | HEIGHT: 66 IN | SYSTOLIC BLOOD PRESSURE: 138 MMHG | WEIGHT: 199 LBS | HEART RATE: 70 BPM | OXYGEN SATURATION: 98 % | BODY MASS INDEX: 31.98 KG/M2 | RESPIRATION RATE: 14 BRPM

## 2025-01-07 DIAGNOSIS — R20.0 NUMBNESS OF LEFT FOOT: Primary | ICD-10-CM

## 2025-01-07 DIAGNOSIS — M54.32 SCIATICA OF LEFT SIDE: ICD-10-CM

## 2025-01-07 DIAGNOSIS — S32.010S COMPRESSION FRACTURE OF L1 VERTEBRA, SEQUELA: ICD-10-CM

## 2025-01-07 PROCEDURE — 3075F SYST BP GE 130 - 139MM HG: CPT | Performed by: INTERNAL MEDICINE

## 2025-01-07 PROCEDURE — 1159F MED LIST DOCD IN RCRD: CPT | Performed by: INTERNAL MEDICINE

## 2025-01-07 PROCEDURE — 99214 OFFICE O/P EST MOD 30 MIN: CPT | Performed by: INTERNAL MEDICINE

## 2025-01-07 PROCEDURE — 3079F DIAST BP 80-89 MM HG: CPT | Performed by: INTERNAL MEDICINE

## 2025-01-07 PROCEDURE — 4010F ACE/ARB THERAPY RXD/TAKEN: CPT | Performed by: INTERNAL MEDICINE

## 2025-01-07 PROCEDURE — 1123F ACP DISCUSS/DSCN MKR DOCD: CPT | Performed by: INTERNAL MEDICINE

## 2025-01-07 PROCEDURE — 3008F BODY MASS INDEX DOCD: CPT | Performed by: INTERNAL MEDICINE

## 2025-01-07 ASSESSMENT — PATIENT HEALTH QUESTIONNAIRE - PHQ9
1. LITTLE INTEREST OR PLEASURE IN DOING THINGS: NOT AT ALL
SUM OF ALL RESPONSES TO PHQ9 QUESTIONS 1 AND 2: 0
2. FEELING DOWN, DEPRESSED OR HOPELESS: NOT AT ALL

## 2025-01-07 NOTE — PROGRESS NOTES
"Subjective    Parish Muñoz is a 64 y.o. female who presents for No chief complaint on file..  HPI    Fu xray results    Wellness done 10/14/2024  She found an xray from 2018 that showed compression fracture of L1  She has no midline tenderness  She has left sided pain down into her buttocks  She has not made appt yet but will be doing physical therapy  She has numbness anterior aspect of her left ankle down into her foot.  Has appt with podiatry    Review of Systems   All other systems reviewed and are negative.        Objective     /80 (BP Location: Left arm, Patient Position: Sitting, BP Cuff Size: Adult)   Pulse 70   Temp 36.3 °C (97.4 °F) (Skin)   Resp 14   Ht 1.676 m (5' 6\")   Wt 90.3 kg (199 lb)   LMP  (LMP Unknown)   SpO2 98%   BMI 32.12 kg/m²    Physical Exam  Vitals reviewed.   Constitutional:       General: She is not in acute distress.     Appearance: Normal appearance.   Cardiovascular:      Rate and Rhythm: Normal rate and regular rhythm.      Pulses: Normal pulses.      Heart sounds: Normal heart sounds.   Pulmonary:      Effort: Pulmonary effort is normal.      Breath sounds: Normal breath sounds.   Abdominal:      Tenderness: There is no abdominal tenderness.   Musculoskeletal:         General: No swelling.      Comments: There is no midline or bony tenderness   Skin:     General: Skin is warm and dry.   Neurological:      Mental Status: She is alert.       Health Maintenance Due   Topic Date Due    MMR Vaccines (1 of 1 - Standard series) Never done    DTaP/Tdap/Td Vaccines (1 - Tdap) Never done    RSV High Risk: (Elderly (60+) or Pregnant Population) (1 - Risk 60-74 years 1-dose series) Never done    Medicare Annual Wellness Visit (AWV)  09/30/2024    Diabetes: Hemoglobin A1C  01/14/2025    TSH Level  02/27/2025          Assessment/Plan   Problem List Items Addressed This Visit    None  Visit Diagnoses       Numbness of left foot    -  Primary    Relevant Orders    EMG & nerve " conduction    Compression fracture of L1 vertebra, sequela        this is old. seen on xray that she brought in from 2018    Sciatica of left side            With her sx and old xray the compression fracture is old.  Will check EMG  She will set up physical therapy for low back pain that is consistent with sciatica

## 2025-01-22 ENCOUNTER — EVALUATION (OUTPATIENT)
Dept: PHYSICAL THERAPY | Facility: CLINIC | Age: 65
End: 2025-01-22
Payer: MEDICARE

## 2025-01-22 DIAGNOSIS — M54.32 SCIATICA OF LEFT SIDE: ICD-10-CM

## 2025-01-22 PROCEDURE — 97161 PT EVAL LOW COMPLEX 20 MIN: CPT | Mod: GP | Performed by: PHYSICAL THERAPIST

## 2025-01-22 PROCEDURE — 97110 THERAPEUTIC EXERCISES: CPT | Mod: GP | Performed by: PHYSICAL THERAPIST

## 2025-01-22 PROCEDURE — 97535 SELF CARE MNGMENT TRAINING: CPT | Mod: GP | Performed by: PHYSICAL THERAPIST

## 2025-01-22 ASSESSMENT — PAIN SCALES - GENERAL: PAINLEVEL_OUTOF10: 0 - NO PAIN

## 2025-01-22 ASSESSMENT — PAIN - FUNCTIONAL ASSESSMENT: PAIN_FUNCTIONAL_ASSESSMENT: 0-10

## 2025-01-22 ASSESSMENT — PAIN DESCRIPTION - DESCRIPTORS: DESCRIPTORS: ACHING;SHARP

## 2025-01-22 NOTE — LETTER
January 22, 2025    Damon Novoa, PT  [address] [facility], [address] [place] [address]    Patient: Parish Muñoz   YOB: 1960   Date of Visit: 1/22/2025       Dear Caroline Tovar DO  [address] [facility] [place] [address]    The attached plan of care is being sent to you because your patient’s medical reimbursement requires that you certify the plan of care. Your signature is required to allow uninterrupted insurance coverage.      You may indicate your approval by signing below and faxing this form back to us at Dept Fax: [phone].    Please call Dept: [phone] with any questions or concerns.    Thank you for this referral,        Damon Novoa, PT  [facility] [address] [facility]  [address] [facility], located within [facility] [address]    Payer: Payor: MEDICARE / Plan: MEDICARE PART A AND B / Product Type: *No Product type* /                                                                         Date:     Dear Damon Novoa, PT,     Re: Ms. Parish Muñoz, MRN:[id number]    I certify that I have reviewed the attached plan of care and it is medically necessary for Ms. Parish Muñoz (1960) who is under my care.          ______________________________________                    _________________  Provider name and credentials                                           Date and time                                                                                           Plan of Care 1/22/25   Effective from: 1/22/2025  Effective to: 1/29/2025    Plan ID: [id number]            Participants as of Finalize on 1/22/2025    Name Type Comments Contact Info    Caroline Tovar DO PCP - [facility] Attributed Provider  [phone]    Damon Novoa, PT Physical Therapist  [phone]       Last Plan Note     Author: Damon Novoa PT Status: Incomplete Last edited: 1/22/2025  2:00 PM       PT Initial Evaluation    Patient Name:   Parish Muñoz    MRN:  [id number]    :  1960    Today's Date:  25    Time Calculation  Start Time: 1400  Stop Time: 1440  Time Calculation (min): 40 min  PT Evaluation Time Entry  PT Evaluation (Low) Time Entry: 15  PT Therapeutic Procedures Time Entry  Therapeutic Exercise Time Entry: 8  Self-Care/Home Mgmt Training: 15       Informed Consent  Patient has been informed of all evaluation findings and treatment plans and agrees to participate in Physical Therapy services and plans as outlined.    Diagnosis:  Diagnosis and Precautions: M54.32 (ICD-10-CM) - Sciatica of left side    Goals: None    Plan of Care:      Treatment/Interventions: Education/ Instruction, Gait training, Self care/ home management, Therapeutic exercises  PT Plan: No Additional PT interventions required at this time  PT Frequency: One time visit  Duration: 1 visit     Certification Period Start Date: 25  Certification Period End Date: 25  Number of Treatments Authorized: 1  Rehab Potential: Good  Plan of Care Agreement: Patient    PT Assessment:    Patient is a 64 y.o. FEMALE with c/o lower back, L hip pain.   Patient is alert and oriented x 3.  Patient presents with medical diagnosis of M54.32 (ICD-10-CM) - Sciatica of left side  contributing to compensatory soft tissue dysfunction, pain, stiffness and weakness of the lumbo-pelvic-hip complex.   Significant past medical history/past surgical history includes see below.    Skilled care is not needed to progress the patient back to these activities without exacerbating symptoms.   Patient does not require skilled PT services to address the problems identified and the individualized patient's goals as outlined in the problems and goals section of this evaluation.  Patient does have significant PMH influencing Rx and reports motivation to return to [facility].   Patient demonstrates to be a good candidate for physical therapy with good rehab potential and  verbalized a good understanding of HER diagnosis, prognosis and treatment.  Patient instructed in a home exercise program, has been given handouts for each of the exercises performed and was given another sheet instructing patient in the amount of reps to perform and the ophelia to follow while doing the exercises.  Patient to continue with her HEP and will be discharged.    PT Assessment Results: Decreased range of motion, Decreased endurance, Pain  Rehab Prognosis: Good  Evaluation/Treatment Tolerance: Patient limited by fatigue    Complexity:  Low complexity evaluation  due to a 15 minute duration, a past medical history WITH any personal factors and/or comorbidities that could impact the POC, examination of body systems completed on one to two elements, the patient presents with a stable condition, and clinical decision making using the SULTANA was of low complexity.     Prognosis:  Rehab Prognosis: Good    Problem List  Decreased knowledge of HEP, Strength, and Endurance    Impairments   IMPAIRED STRENGTH LOWER BODY, IMPAIRED CORE STABILITY, and INCREASED PAIN    Functional Limitations:  LIMITATIONS PERFORMING BASIC ADL'S, PARTICIPATION IN HOBBIES, PARTICIPATION IN LEISURE ACTIVITIES, and PARTICIPATION IN HOME MANAGEMENT    General Visit Information:  Reason for Referral: PT Evaluation  Referred By: Dr. Caroline Tovar  General Comment: M54.32 (ICD-10-CM) - Sciatica of left side    Pre-Cautions:  JOSHUA Fall Risk Score (The score of 4 or more indicates an increased risk of falling): 0     Medical Precautions:  (history of 2 previous back surgeries 2009/2013, fractured big toe, HTN, anemia, diabetes, asthma, RA, L foot surgery))     Reason for Visit:  PT Evaluate and Treat    Initial Evaluation:  Referred By: Dr. Caroline Tovar    Insurance  Insurance reviewed  Name of Insurance:  Medicare  Visit No.  1  * (EVAL) SCIATICA LEFT SIDE: MEDICARE 257 DED // 0 COPAY // 20% COINSUR // 0 OOP // UNLIM V BMN // KX MOD  AFTER 20THV // MED MUT SUPP IS SEC TO MEDICARE AND WILL COV PART B COINSUR AFTER DED IS MET [id number] // Lorelei confirmed 1/18/25 2:01pm     Subjective:    Current Episode  Date of Onset:  3 months  Mechanism of injury:  unknown LIA, (history of back surgery x 2 in 2009/2013, denies any MVA, falls, trauma)  Chief Complaint:  Lower back pain, L hip pain  Progression of symptoms:  same over time  Previous treatments:  none  Relevant medical history:  see below    Pain Score:  Pain Assessment: 0-10  Pain Assessment  Pain Assessment: 0-10  0-10 (Numeric) Pain Score: 0 - No pain (6/10 worst)  Pain Type: Chronic pain  Pain Location: Back  Pain Orientation: Left, Lower, Posterior  Pain Radiating Towards: L hip/buttock  Pain Descriptors: Aching, Sharp  Pain Frequency: Intermittent  Pain Onset: Ongoing  Pain Type: Chronic pain  Pain Location: Back  Pain Orientation: Left, Lower, Posterior  Pain Descriptors: Aching, Sharp  Pain Frequency: Intermittent    Better with:  heating pad, meds    Worse with:  prolonged standing/walking, lifting/pushing/pulling/carrying    Medical History/Surgical History:  Medical Precautions:  (history of 2 previous back surgeries 2009/2013, fractured big toe, HTN, anemia, diabetes, asthma, RA, L foot surgery)    Reviewed medical history form with patient (medications/allergies reviewed with patient).  Current Outpatient Medications   Medication Instructions   • ascorbic acid (Vitamin C) 1,000 mg tablet Take by mouth.   • aspirin 81 mg chewable tablet Chew.   • calcium-D3-zinc-copper-babar 325 mg-12.5 mcg -2.75 mg tablet Take by mouth.   • cetirizine (ZYRTEC) 10 mg, Daily   • cholecalciferol (Vitamin D-3) 25 MCG (1000 UT) tablet Take by mouth.   • cranberry extract 200 mg capsule Take by mouth.   • cyanocobalamin, vitamin B-12, (Vitamin B-12) 1,000 mcg tablet extended release 1 tablet, Daily   • ferrous sulfate 65 mg, Daily with breakfast   • fish oil concentrate (Omega-3) 120-180 mg capsule Take by  mouth.   • folic acid (FOLVITE) 0.4 mg, Daily   • hydroxychloroquine (PLAQUENIL) 400 mg, oral, Daily   • hydrOXYzine HCL (ATARAX) 25 mg, oral, Nightly   • lansoprazole (PREVACID) 15 mg, Daily before breakfast   • levalbuterol (Xopenex) 45 mcg/actuation inhaler 1 puff, Every 4 hours PRN   • losartan (COZAAR) 100 mg, oral, Daily   • metFORMIN (GLUCOPHAGE) 1,000 mg, oral, 2 times daily (morning and late afternoon)   • methotrexate (TREXALL) 20 mg, oral, Once Weekly   • metoprolol succinate XL (TOPROL-XL) 50 mg, oral, Daily   • mometasone (Asmanex Twisthaler) 220 mcg/ actuation (60) aerosol powdr breath activated 1 puff, inhalation, 2 times daily   • simvastatin (ZOCOR) 10 mg, oral, Nightly     Radiology:  Exam Information    Status Exam Begun Exam Ended   Final 12/27/2024 10:59 12/27/2024 11:13     Study Result    Narrative & Impression   Interpreted By:  Eren Carroll,   STUDY:  XR LUMBAR SPINE 2-3 VIEWS; XR HIP LEFT WITH PELVIS WHEN PERFORMED 2  OR 3 VIEWS; ;  12/27/2024 11:13 am; 12/27/2024 11:12 am      INDICATION:  Signs/Symptoms:pain.      ,M25.552 Pain in left hip,M54.32 Sciatica, left side; ,M54.32  Sciatica, left side      COMPARISON:  None.      ACCESSION NUMBER(S):  [id number]; [id number]      ORDERING CLINICIAN:  MICHAELLE GALINDO      FINDINGS:  Three views of the lumbar spine. AP view of the pelvis along with two  views of the left hip.      There is no acute fracture or dislocation of the pelvis. There is  mild bowel hip osteoarthrosis. Mild bilateral sacroiliac degenerative  change.      The age-indeterminate compression deformities of L1 and L2, likely  subacute to chronic. Moderate lower lumbar facet hypertrophy.  Moderate L4-5 discogenic degenerative change. The soft tissues are  unremarkable.      IMPRESSION:  Age-indeterminate compression deformities of L1 and L2.      Moderate lower lumbar spondylosis, most pronounced at L4-L5.     For lower back/neck only:  Previous PT:  NO  Previous  chiropractic:  NO  Previous acupuncture:  NO  Previous pain management:  NO  Lower back surgery:  YES  Massage: YES    Functional Assessment:  Level of Eddy:  Level of Eddy: Independent with ADLs and functional transfers    Social History:    Work Status:  RETIRED  Patient Awareness:  Patient is aware of HER diagnosis and prognosis.  Social Support/History:  LIVES ALONE    Objective:    Weightbearing Status:  FWB    Skin:  surgical incisions lower back healed and closed.    Palpation:   mild point tenderness over lumbar paraspinals    Sensation:  Patient denies numbness/tingling of bilateral LOWER extremities.    Gait:  Normal gait    ROM:  AROM  Lumbar flexion WNL's  Extension WNL's  SB/Rotation WNL's R and L  R knee AROM WNL's, L knee AROM WNL's, R hip AROM WNL's, L hip AROM WNL's, R ankle/foot AROM WNL's, and L ankle/foot AROM WNL's    Strength:    R hip 4+/5 all planes, L hip 4+/5 all planes except extension 4/5  R knee 5/5 all planes, L knee 5/5 all planes, R ankle/foot 5/5 all planes, and L ankle/foot 5/5 all planes    Special Tests:  negative SLR R and L, negative slump R and L    Outcome measure     Oswestry Disablity Index (SULTANA): 22%     Treatment  Time in clinic started at  2pm  Time in clinic ended at  2:40pm  Total time in clinic is . 40 minutes  Total timed code time is  38 minutes    Treatment Performed Today:.   PT Initial Evaluation, Therapeutic Exercise, and Self-Care/Home Management HEP  Individual(s) Educated: Patient  Education Provided: Home Exercise Program  Diagnosis and Precautions: M54.32 (ICD-10-CM) - Sciatica of left side  Risk and Benefits Discussed with Patient/Caregiver/Other: yes  Patient/Caregiver Demonstrated Understanding: yes  Plan of Care Discussed and Agreed Upon: yes  Patient Response to Education: Patient/Caregiver Verbalized Understanding of Information, Patient/Caregiver Performed Return Demonstration of Exercises/Activities    Patient instructed in a home  exercise program, has been given handouts for each of the exercises performed and was given another sheet instructing patient in the amount of reps to perform and the ophelia to follow while doing the exercises     Access Code: [email or web address]  URL: [email or web address]  Date: 01/22/2025  Prepared by: Damon Novoa    Exercises  - Supine Lower Trunk Rotation  - 1 x daily - 3-4 x weekly - 1 sets - 10 reps - 10 hold  - Supine Piriformis Stretch with Foot on Ground  - 1 x daily - 4-5 x weekly - 1 sets - 10 reps - 30 hold  - Supine Single Knee to Chest Stretch  - 1 x daily - 4-5 x weekly - 1 sets - 10 reps - 30 hold  - Supine Double Knee to Chest  - 1 x daily - 3-4 x weekly - 1 sets - 10 reps - 30 hold  - Supine Hamstring Stretch  - 1 x daily - 3-4 x weekly - 1 sets - 10 reps - 30 hold  - Modified Chester Stretch  - 1 x daily - 3-4 x weekly - 1 sets - 10 reps - 30 hold  - Supine Posterior Pelvic Tilt  - 1 x daily - 3-4 x weekly - 2 sets - 10 reps - 5-10 hold  - Supine Bridge  - 1 x daily - 3-4 x weekly - 2 sets - 10 reps - 5-10 hold  - Supine March  - 1 x daily - 3-4 x weekly - 2 sets - 10 reps - 5-10 hold  - Hooklying Heel Walk  - 1 x daily - 3-4 x weekly - 2 sets - 10 reps - 5-10 hold  - Supine Double Bent Leg Lift  - 1 x daily - 3-4 x weekly - 2 sets - 10 reps - 5-10 hold  - Supine Bicycles  - 1 x daily - 3-4 x weekly - 2 sets - 10 reps - 5-10 hold  - Bird Dog  - 1 x daily - 3-4 x weekly - 2 sets - 10 reps - 5-10 hold (do not do this one)    Access Code: [email or web address]  URL: [email or web address]  Date: 01/22/2025  Prepared by: Damon Novoa    Exercises  - Prone Alternating Arm and Leg Lifts  - 1 x daily - 3-4 x weekly - 2 sets - 10 reps - 5-10 hold         Current Participants as of 1/22/2025    Name Type Comments Contact Info    Caroline Tovar DO [facility] Attributed Provider  [phone]    Signature pending    Damon Novoa, PT Physical Therapist  [phone]     Signature pending

## 2025-01-22 NOTE — PROGRESS NOTES
PT Initial Evaluation    Patient Name:  Parish Muñoz    MRN:  [id number]    :  1960    Today's Date:  25    Time Calculation  Start Time: 1400  Stop Time: 1440  Time Calculation (min): 40 min  PT Evaluation Time Entry  PT Evaluation (Low) Time Entry: 15  PT Therapeutic Procedures Time Entry  Therapeutic Exercise Time Entry: 8  Self-Care/Home Mgmt Training: 15       Informed Consent  Patient has been informed of all evaluation findings and treatment plans and agrees to participate in Physical Therapy services and plans as outlined.    Diagnosis:  Diagnosis and Precautions: M54.32 (ICD-10-CM) - Sciatica of left side    Goals: None    Plan of Care:      Treatment/Interventions: Education/ Instruction, Gait training, Self care/ home management, Therapeutic exercises  PT Plan: No Additional PT interventions required at this time  PT Frequency: One time visit  Duration: 1 visit     Certification Period Start Date: 25  Certification Period End Date: 25  Number of Treatments Authorized: 1  Rehab Potential: Good  Plan of Care Agreement: Patient    PT Assessment:    Patient is a 64 y.o. FEMALE with c/o lower back, L hip pain.   Patient is alert and oriented x 3.  Patient presents with medical diagnosis of M54.32 (ICD-10-CM) - Sciatica of left side  contributing to compensatory soft tissue dysfunction, pain, stiffness and weakness of the lumbo-pelvic-hip complex.   Significant past medical history/past surgical history includes see below.    Skilled care is not needed to progress the patient back to these activities without exacerbating symptoms.   Patient does not require skilled PT services to address the problems identified and the individualized patient's goals as outlined in the problems and goals section of this evaluation.  Patient does have significant PMH influencing Rx and reports motivation to return to [facility].   Patient demonstrates to be a good candidate for physical  therapy with good rehab potential and verbalized a good understanding of HER diagnosis, prognosis and treatment.  Patient instructed in a home exercise program, has been given handouts for each of the exercises performed and was given another sheet instructing patient in the amount of reps to perform and the ophelia to follow while doing the exercises.  Patient to continue with her HEP and will be discharged.    PT Assessment Results: Decreased range of motion, Decreased endurance, Pain  Rehab Prognosis: Good  Evaluation/Treatment Tolerance: Patient limited by fatigue    Complexity:  Low complexity evaluation  due to a 15 minute duration, a past medical history WITH any personal factors and/or comorbidities that could impact the POC, examination of body systems completed on one to two elements, the patient presents with a stable condition, and clinical decision making using the SULTANA was of low complexity.     Prognosis:  Rehab Prognosis: Good    Problem List  Decreased knowledge of HEP, Strength, and Endurance    Impairments   IMPAIRED STRENGTH LOWER BODY, IMPAIRED CORE STABILITY, and INCREASED PAIN    Functional Limitations:  LIMITATIONS PERFORMING BASIC ADL'S, PARTICIPATION IN HOBBIES, PARTICIPATION IN LEISURE ACTIVITIES, and PARTICIPATION IN HOME MANAGEMENT    General Visit Information:  Reason for Referral: PT Evaluation  Referred By: Dr. Caroline Tovar  General Comment: M54.32 (ICD-10-CM) - Sciatica of left side    Pre-Cautions:  JOSHUA Fall Risk Score (The score of 4 or more indicates an increased risk of falling): 0     Medical Precautions:  (history of 2 previous back surgeries 2009/2013, fractured big toe, HTN, anemia, diabetes, asthma, RA, L foot surgery))     Reason for Visit:  PT Evaluate and Treat    Initial Evaluation:  Referred By: Dr. Caroline Tovar    Insurance  Insurance reviewed  Name of Insurance:  Medicare  Visit No.  1  * (EVAL) SCIATICA LEFT SIDE: MEDICARE 257 DED // 0 COPAY // 20%  COINSUR // 0 OOP // UNLIM V BMN // KX MOD AFTER 20THV // MED MUT SUPP IS SEC TO MEDICARE AND WILL COV PART B COINSUR AFTER DED IS MET [id number] // Lorelei confirmed 1/18/25 2:01pm     Subjective:    Current Episode  Date of Onset:  3 months  Mechanism of injury:  unknown LIA, (history of back surgery x 2 in 2009/2013, denies any MVA, falls, trauma)  Chief Complaint:  Lower back pain, L hip pain  Progression of symptoms:  same over time  Previous treatments:  none  Relevant medical history:  see below    Pain Score:  Pain Assessment: 0-10  Pain Assessment  Pain Assessment: 0-10  0-10 (Numeric) Pain Score: 0 - No pain (6/10 worst)  Pain Type: Chronic pain  Pain Location: Back  Pain Orientation: Left, Lower, Posterior  Pain Radiating Towards: L hip/buttock  Pain Descriptors: Aching, Sharp  Pain Frequency: Intermittent  Pain Onset: Ongoing  Pain Type: Chronic pain  Pain Location: Back  Pain Orientation: Left, Lower, Posterior  Pain Descriptors: Aching, Sharp  Pain Frequency: Intermittent    Better with:  heating pad, meds    Worse with:  prolonged standing/walking, lifting/pushing/pulling/carrying    Medical History/Surgical History:  Medical Precautions:  (history of 2 previous back surgeries 2009/2013, fractured big toe, HTN, anemia, diabetes, asthma, RA, L foot surgery)    Reviewed medical history form with patient (medications/allergies reviewed with patient).  Current Outpatient Medications   Medication Instructions    ascorbic acid (Vitamin C) 1,000 mg tablet Take by mouth.    aspirin 81 mg chewable tablet Chew.    calcium-D3-zinc-copper-babar 325 mg-12.5 mcg -2.75 mg tablet Take by mouth.    cetirizine (ZYRTEC) 10 mg, Daily    cholecalciferol (Vitamin D-3) 25 MCG (1000 UT) tablet Take by mouth.    cranberry extract 200 mg capsule Take by mouth.    cyanocobalamin, vitamin B-12, (Vitamin B-12) 1,000 mcg tablet extended release 1 tablet, Daily    ferrous sulfate 65 mg, Daily with breakfast    fish oil concentrate  (Omega-3) 120-180 mg capsule Take by mouth.    folic acid (FOLVITE) 0.4 mg, Daily    hydroxychloroquine (PLAQUENIL) 400 mg, oral, Daily    hydrOXYzine HCL (ATARAX) 25 mg, oral, Nightly    lansoprazole (PREVACID) 15 mg, Daily before breakfast    levalbuterol (Xopenex) 45 mcg/actuation inhaler 1 puff, Every 4 hours PRN    losartan (COZAAR) 100 mg, oral, Daily    metFORMIN (GLUCOPHAGE) 1,000 mg, oral, 2 times daily (morning and late afternoon)    methotrexate (TREXALL) 20 mg, oral, Once Weekly    metoprolol succinate XL (TOPROL-XL) 50 mg, oral, Daily    mometasone (Asmanex Twisthaler) 220 mcg/ actuation (60) aerosol powdr breath activated 1 puff, inhalation, 2 times daily    simvastatin (ZOCOR) 10 mg, oral, Nightly     Radiology:  Exam Information    Status Exam Begun Exam Ended   Final 12/27/2024 10:59 12/27/2024 11:13     Study Result    Narrative & Impression   Interpreted By:  Eren Carroll,   STUDY:  XR LUMBAR SPINE 2-3 VIEWS; XR HIP LEFT WITH PELVIS WHEN PERFORMED 2  OR 3 VIEWS; ;  12/27/2024 11:13 am; 12/27/2024 11:12 am      INDICATION:  Signs/Symptoms:pain.      ,M25.552 Pain in left hip,M54.32 Sciatica, left side; ,M54.32  Sciatica, left side      COMPARISON:  None.      ACCESSION NUMBER(S):  [id number]; [id number]      ORDERING CLINICIAN:  MICHAELLE GALINDO      FINDINGS:  Three views of the lumbar spine. AP view of the pelvis along with two  views of the left hip.      There is no acute fracture or dislocation of the pelvis. There is  mild bowel hip osteoarthrosis. Mild bilateral sacroiliac degenerative  change.      The age-indeterminate compression deformities of L1 and L2, likely  subacute to chronic. Moderate lower lumbar facet hypertrophy.  Moderate L4-5 discogenic degenerative change. The soft tissues are  unremarkable.      IMPRESSION:  Age-indeterminate compression deformities of L1 and L2.      Moderate lower lumbar spondylosis, most pronounced at L4-L5.     For lower back/neck only:  Previous  PT:  NO  Previous chiropractic:  NO  Previous acupuncture:  NO  Previous pain management:  NO  Lower back surgery:  YES  Massage: YES    Functional Assessment:  Level of Claiborne:  Level of Claiborne: Independent with ADLs and functional transfers    Social History:    Work Status:  RETIRED  Patient Awareness:  Patient is aware of HER diagnosis and prognosis.  Social Support/History:  LIVES ALONE    Objective:    Weightbearing Status:  FWB    Skin:  surgical incisions lower back healed and closed.    Palpation:   mild point tenderness over lumbar paraspinals    Sensation:  Patient denies numbness/tingling of bilateral LOWER extremities.    Gait:  Normal gait    ROM:  AROM  Lumbar flexion WNL's  Extension WNL's  SB/Rotation WNL's R and L  R knee AROM WNL's, L knee AROM WNL's, R hip AROM WNL's, L hip AROM WNL's, R ankle/foot AROM WNL's, and L ankle/foot AROM WNL's    Strength:    R hip 4+/5 all planes, L hip 4+/5 all planes except extension 4/5  R knee 5/5 all planes, L knee 5/5 all planes, R ankle/foot 5/5 all planes, and L ankle/foot 5/5 all planes    Special Tests:  negative SLR R and L, negative slump R and L    Outcome measure     Oswestry Disablity Index (SULTANA): 22%     Treatment  Time in clinic started at  2pm  Time in clinic ended at  2:40pm  Total time in clinic is . 40 minutes  Total timed code time is  38 minutes    Treatment Performed Today:.   PT Initial Evaluation, Therapeutic Exercise, and Self-Care/Home Management HEP  Individual(s) Educated: Patient  Education Provided: Home Exercise Program  Diagnosis and Precautions: M54.32 (ICD-10-CM) - Sciatica of left side  Risk and Benefits Discussed with Patient/Caregiver/Other: yes  Patient/Caregiver Demonstrated Understanding: yes  Plan of Care Discussed and Agreed Upon: yes  Patient Response to Education: Patient/Caregiver Verbalized Understanding of Information, Patient/Caregiver Performed Return Demonstration of Exercises/Activities    Patient  instructed in a home exercise program, has been given handouts for each of the exercises performed and was given another sheet instructing patient in the amount of reps to perform and the ophelia to follow while doing the exercises     Access Code: [email or web address]  URL: [email or web address]  Date: 01/22/2025  Prepared by: Damon Novoa    Exercises  - Supine Lower Trunk Rotation  - 1 x daily - 3-4 x weekly - 1 sets - 10 reps - 10 hold  - Supine Piriformis Stretch with Foot on Ground  - 1 x daily - 4-5 x weekly - 1 sets - 10 reps - 30 hold  - Supine Single Knee to Chest Stretch  - 1 x daily - 4-5 x weekly - 1 sets - 10 reps - 30 hold  - Supine Double Knee to Chest  - 1 x daily - 3-4 x weekly - 1 sets - 10 reps - 30 hold  - Supine Hamstring Stretch  - 1 x daily - 3-4 x weekly - 1 sets - 10 reps - 30 hold  - Modified Chester Stretch  - 1 x daily - 3-4 x weekly - 1 sets - 10 reps - 30 hold  - Supine Posterior Pelvic Tilt  - 1 x daily - 3-4 x weekly - 2 sets - 10 reps - 5-10 hold  - Supine Bridge  - 1 x daily - 3-4 x weekly - 2 sets - 10 reps - 5-10 hold  - Supine March  - 1 x daily - 3-4 x weekly - 2 sets - 10 reps - 5-10 hold  - Hooklying Heel Walk  - 1 x daily - 3-4 x weekly - 2 sets - 10 reps - 5-10 hold  - Supine Double Bent Leg Lift  - 1 x daily - 3-4 x weekly - 2 sets - 10 reps - 5-10 hold  - Supine Bicycles  - 1 x daily - 3-4 x weekly - 2 sets - 10 reps - 5-10 hold  - Bird Dog  - 1 x daily - 3-4 x weekly - 2 sets - 10 reps - 5-10 hold (do not do this one)    Access Code: [email or web address]  URL: [email or web address]  Date: 01/22/2025  Prepared by: Damon Novoa    Exercises  - Prone Alternating Arm and Leg Lifts  - 1 x daily - 3-4 x weekly - 2 sets - 10 reps - 5-10 hold

## 2025-01-24 ENCOUNTER — HOSPITAL ENCOUNTER (OUTPATIENT)
Facility: CLINIC | Age: 65
Discharge: HOME | End: 2025-01-24
Payer: MEDICARE

## 2025-01-24 DIAGNOSIS — R20.0 NUMBNESS OF LEFT FOOT: ICD-10-CM

## 2025-02-17 ENCOUNTER — APPOINTMENT (OUTPATIENT)
Dept: CARDIOLOGY | Facility: HOSPITAL | Age: 65
End: 2025-02-17
Payer: MEDICARE

## 2025-02-26 ENCOUNTER — HOSPITAL ENCOUNTER (OUTPATIENT)
Dept: CARDIOLOGY | Facility: HOSPITAL | Age: 65
Discharge: HOME | End: 2025-02-26
Payer: MEDICARE

## 2025-02-26 DIAGNOSIS — I73.9 PERIPHERAL VASCULAR DISEASE, UNSPECIFIED (CMS-HCC): ICD-10-CM

## 2025-02-26 DIAGNOSIS — I73.89 OTHER SPECIFIED PERIPHERAL VASCULAR DISEASES: ICD-10-CM

## 2025-02-26 PROCEDURE — 93922 UPR/L XTREMITY ART 2 LEVELS: CPT

## 2025-02-26 PROCEDURE — 93922 UPR/L XTREMITY ART 2 LEVELS: CPT | Performed by: INTERNAL MEDICINE

## 2025-03-17 DIAGNOSIS — L29.9 ITCHING: ICD-10-CM

## 2025-03-17 DIAGNOSIS — I10 PRIMARY HYPERTENSION: ICD-10-CM

## 2025-03-17 DIAGNOSIS — E78.2 MIXED HYPERLIPIDEMIA: ICD-10-CM

## 2025-03-17 DIAGNOSIS — E11.9 TYPE 2 DIABETES MELLITUS WITHOUT COMPLICATION, WITHOUT LONG-TERM CURRENT USE OF INSULIN (MULTI): ICD-10-CM

## 2025-03-17 RX ORDER — LOSARTAN POTASSIUM 100 MG/1
100 TABLET ORAL DAILY
Qty: 90 TABLET | Refills: 3 | Status: SHIPPED | OUTPATIENT
Start: 2025-03-17 | End: 2026-03-17

## 2025-03-17 RX ORDER — METOPROLOL SUCCINATE 50 MG/1
50 TABLET, EXTENDED RELEASE ORAL DAILY
Qty: 90 TABLET | Refills: 3 | Status: SHIPPED | OUTPATIENT
Start: 2025-03-17

## 2025-03-17 RX ORDER — HYDROXYZINE HYDROCHLORIDE 25 MG/1
25 TABLET, FILM COATED ORAL NIGHTLY
Qty: 90 TABLET | Refills: 3 | Status: SHIPPED | OUTPATIENT
Start: 2025-03-17

## 2025-03-17 RX ORDER — SIMVASTATIN 10 MG/1
10 TABLET, FILM COATED ORAL NIGHTLY
Qty: 90 TABLET | Refills: 3 | Status: SHIPPED | OUTPATIENT
Start: 2025-03-17 | End: 2026-03-17

## 2025-03-17 RX ORDER — METFORMIN HYDROCHLORIDE 1000 MG/1
1000 TABLET ORAL
Qty: 180 TABLET | Refills: 3 | Status: SHIPPED | OUTPATIENT
Start: 2025-03-17 | End: 2026-03-17

## 2025-03-17 NOTE — TELEPHONE ENCOUNTER
Parish Muñoz [address] [place] [address] Clinical Support Staff  Phone Number: [phone]     Can you please send my prescription refills to [facility] in [place]?  Thanks!    Losartan  Metoprolol  Metformin  Simvastatin  Hydroxyzine   Pt slept through the shift without any problems. Checked patient every 15 minutes, he remained safe. Will continue to monitor patient.

## 2025-03-26 ENCOUNTER — APPOINTMENT (OUTPATIENT)
Dept: ALLERGY | Facility: CLINIC | Age: 65
End: 2025-03-26
Payer: MEDICARE

## 2025-03-26 VITALS
TEMPERATURE: 97.2 F | BODY MASS INDEX: 31.98 KG/M2 | SYSTOLIC BLOOD PRESSURE: 144 MMHG | WEIGHT: 199 LBS | HEIGHT: 66 IN | RESPIRATION RATE: 17 BRPM | HEART RATE: 77 BPM | OXYGEN SATURATION: 97 % | DIASTOLIC BLOOD PRESSURE: 78 MMHG

## 2025-03-26 DIAGNOSIS — J45.20 MILD INTERMITTENT ASTHMA, UNSPECIFIED WHETHER COMPLICATED (HHS-HCC): ICD-10-CM

## 2025-03-26 DIAGNOSIS — Z91.013 SHELLFISH ALLERGY: ICD-10-CM

## 2025-03-26 DIAGNOSIS — J30.81 ALLERGIC RHINITIS DUE TO DOGS: ICD-10-CM

## 2025-03-26 DIAGNOSIS — M45.9 RHEUMATOID ARTHRITIS INVOLVING VERTEBRA, UNSPECIFIED WHETHER RHEUMATOID FACTOR PRESENT (MULTI): ICD-10-CM

## 2025-03-26 DIAGNOSIS — J30.0 VASOMOTOR RHINITIS: Primary | ICD-10-CM

## 2025-03-26 PROCEDURE — 94060 EVALUATION OF WHEEZING: CPT | Performed by: ALLERGY & IMMUNOLOGY

## 2025-03-26 PROCEDURE — 99204 OFFICE O/P NEW MOD 45 MIN: CPT | Performed by: ALLERGY & IMMUNOLOGY

## 2025-03-26 PROCEDURE — 94640 AIRWAY INHALATION TREATMENT: CPT | Performed by: ALLERGY & IMMUNOLOGY

## 2025-03-26 RX ORDER — LEVALBUTEROL TARTRATE 45 UG/1
1 AEROSOL, METERED ORAL EVERY 4 HOURS PRN
Qty: 15 G | Refills: 3 | Status: SHIPPED | OUTPATIENT
Start: 2025-03-26 | End: 2025-04-25

## 2025-03-26 RX ORDER — FLUTICASONE FUROATE 100 UG/1
1 POWDER RESPIRATORY (INHALATION) DAILY
Qty: 1 EACH | Refills: 1 | Status: SHIPPED | OUTPATIENT
Start: 2025-03-26 | End: 2026-03-26

## 2025-03-26 RX ORDER — ALBUTEROL SULFATE 0.83 MG/ML
2.5 SOLUTION RESPIRATORY (INHALATION) ONCE
Status: COMPLETED | OUTPATIENT
Start: 2025-03-26 | End: 2025-03-26

## 2025-03-26 RX ORDER — IPRATROPIUM BROMIDE 21 UG/1
2 SPRAY, METERED NASAL EVERY 12 HOURS
Qty: 30 ML | Refills: 12 | Status: SHIPPED | OUTPATIENT
Start: 2025-03-26 | End: 2026-03-26

## 2025-03-26 RX ORDER — TRIAMCINOLONE ACETONIDE 55 UG/1
1 SPRAY, METERED NASAL AS NEEDED
COMMUNITY

## 2025-03-26 RX ADMIN — ALBUTEROL SULFATE 2.5 MG: 0.83 SOLUTION RESPIRATORY (INHALATION) at 14:17

## 2025-03-26 NOTE — PROGRESS NOTES
Patient ID: Parish Muñoz is a 64 y.o. female.     Chief Complaint: NPV referred by Dr. Vann  History Of Present Illness  Parish Muñoz is a 64 y.o. female with PMx allergy and asthma presenting for consultation.   Moved from [place] in 2020.    Food Allergy  Shellfish allergy-painful GI symptoms.  Avoids all fish.  Itchy throat.  No epi pen, never tested.    Eczema/ Atopic Dermatitis  History of eczema. Good right now    Asthma  Age at diagnosis: 20's when she was in [place]  Current medication: Asmanex, not covered well with insurance. 90 day supply $280.  Hospitalizations/ER visits in the last year: None for asthma  Oral steroid/systemic steroids in the last year: none in the last one year  Triggers: dogs, dust.  Smoker or Smoke exposure: none  Family history:    Family History   Problem Relation Name Age of Onset    Lung cancer Father      Breast cancer Father's Sister      Breast cancer Maternal Grandmother Jaja          Rhinoconjunctivitis  Previously on allergy shots > 20 yrs ago    Drug Allergy   Hives with esomeprazole and Naproxen. Can take asa and ibuprofen    Insect Allergy   No    Infections  No history of frequent or recurrent infections      Review of Systems    Pertinent positives and negatives have been assessed in the HPI. All other systems have been reviewed and are negative except as noted in the HPI.    Allergies  Esomeprazole, Naproxen sodium, Shellfish derived, and Corn    Past Medical History  She has a past medical history of Asthma, Diabetes (Multi), H/O rheumatoid arthritis, Hyperlipidemia, Hypertension, and Seasonal allergies.    Family History  Family History   Problem Relation Name Age of Onset    Lung cancer Father      Breast cancer Father's Sister      Breast cancer Maternal Grandmother Jaja        Surgical History  She has a past surgical history that includes Other surgical history (07/27/2020); Other surgical history (07/27/2020); and Other surgical history  (07/27/2020).    Tonsils removed at age 4 yo.    Social/Environmental History  She reports that she has never smoked. She has never used smokeless tobacco. She reports that she does not drink alcohol and does not use drugs.    Home: Lives in a house   Floors: vinyl, carpet in the bedroom  Air Conditioning: Central  Smoker: No  Pets: 2 cats, not in the bedroom and hairless  Infestations: No  Molds: No  Occupation: retired    MEDICATIONS  Current Outpatient Medications on File Prior to Visit   Medication Sig Dispense Refill    ascorbic acid (Vitamin C) 1,000 mg tablet Take by mouth.      aspirin 81 mg chewable tablet Chew.      calcium-D3-zinc-copper-babar 325 mg-12.5 mcg -2.75 mg tablet Take by mouth.      cetirizine (ZyrTEC) 10 mg tablet Take 1 tablet (10 mg) by mouth once daily.      cholecalciferol (Vitamin D-3) 25 MCG (1000 UT) tablet Take by mouth.      cranberry extract 200 mg capsule Take by mouth.      cyanocobalamin, vitamin B-12, (Vitamin B-12) 1,000 mcg tablet extended release Take 1 tablet (1,000 mcg) by mouth once daily.      ferrous sulfate 325 (65 Fe) MG EC tablet Take 65 mg by mouth once daily with breakfast. Do not crush, chew, or split.      fish oil concentrate (Omega-3) 120-180 mg capsule Take by mouth.      folic acid (Folvite) 1 mg tablet Take 0.4 mg by mouth once daily.      hydroxychloroquine (Plaquenil) 200 mg tablet Take 2 tablets (400 mg) by mouth once daily. 180 tablet 1    hydrOXYzine HCL (Atarax) 25 mg tablet Take 1 tablet (25 mg) by mouth once daily at bedtime. 90 tablet 3    lansoprazole (Prevacid) 15 mg DR capsule Take 1 capsule (15 mg) by mouth once daily in the morning. Take before meals.      levalbuterol (Xopenex) 45 mcg/actuation inhaler Inhale 1 puff every 4 hours if needed.      losartan (Cozaar) 100 mg tablet Take 1 tablet (100 mg) by mouth once daily. 90 tablet 3    metFORMIN (Glucophage) 1,000 mg tablet Take 1 tablet (1,000 mg) by mouth 2 times daily (morning and late  afternoon). 180 tablet 3    metoprolol succinate XL (Toprol-XL) 50 mg 24 hr tablet Take 1 tablet (50 mg) by mouth once daily. 90 tablet 3    simvastatin (Zocor) 10 mg tablet Take 1 tablet (10 mg) by mouth once daily at bedtime. 90 tablet 3    triamcinolone (Nasacort) 55 mcg nasal inhaler Administer 1 spray into each nostril if needed for rhinitis.      [] methotrexate (Trexall) 2.5 mg tablet Take 8 tablets (20 mg total) by mouth 1 (one) time per week. 96 tablet 1    mometasone (Asmanex Twisthaler) 220 mcg/ actuation (60) aerosol powdr breath activated Inhale 1 puff 2 times a day. 3 each 3     No current facility-administered medications on file prior to visit.       Physical Exam  Visit Vitals  LMP  (LMP Unknown)   OB Status Postmenopausal   Smoking Status Never       Wt Readings from Last 1 Encounters:   25 90.3 kg (199 lb)       Physical Exam    General: Well appearing, no acute distress  Head: Normocephalic, atraumatic, neck supple without lymphadenopathy  Eyes: EOMI, non-injected  Ears: TM's normal  Nose: No nasal crease, nares patent, slightly boggy turbinates, minimal discharge  Throat: Normal dentition, no erythema  Heart: Regular rate and rhythm  Lungs: Clear to auscultation bilaterally, effort normal  Abdomen: Soft, non-tender, normal bowel sounds  Extremities: Moves all extremities symmetrically, no edema  Skin: No rashes/lesions  Psych: normal mood and affect    LAB RESULTS:  CBC:  Recent Labs     10/28/24  1045 24  1029 24  1046 10/05/23  1112 23  1142   WBC 4.1* 5.0 4.1*   < > 5.2   HGB 13.1 12.0 11.7*   < > 10.6*   HCT 41.6 37.0 37.6   < > 33.8*    294 277   < > 322   MCV 91 86 87   < > 85   EOSABS 0.03 0.39  --   --  0.34    < > = values in this interval not displayed.       CMP:  Recent Labs     10/28/24  1045 24  1029 10/05/23  1112    140 146*   K 4.4 4.4 4.6    101 107   CO2 30 31 31   ANIONGAP 10 12 13   BUN 16 20 15   CREATININE 1.01 0.95  1.01   EGFR 62 67 62     Recent Labs     10/28/24  1045 04/25/24  1029 10/05/23  1112   ALBUMIN 4.4 4.4 4.2   ALKPHOS 46 43 44   ALT 28 30 37   AST 34 38 42*   BILITOT 0.9 0.5 0.5       Recent Labs     10/28/24  1045 04/25/24  1029 07/11/23  1142   EOSABS 0.03 0.39 0.34         HEME/ENDO:  Recent Labs     10/14/24  1107 07/08/24  1134 04/01/24  1046 02/27/24  1031 01/31/24  1049 01/24/24  1042 05/26/23  1053 03/14/23  1456 02/03/21  1008   TSH  --   --   --  3.68  --   --   --   --   --    HGBA1C 6.5 6.5  --   --  6.4  --    < >  --   --    FERRITIN  --   --   --   --   --   --   --  32 19   IRONSAT  --   --  36 14*  --  7  --  15* 11*    < > = values in this interval not displayed.     Pre and post spirometry    Assessment/Plan   Parish is a 63 yo with a history of allergic rhinitis, food intolerance and asthma.  She described atrophic rhinitis. Her past medical history is also significant for RA.She declines repeat allergy testing. She no longer has coverage for her asmanex.  Will do switch to Arnuity which does appear on patient formulary list she has with her today.  Will add Ipratropium for as needed. Continue with allergy medications (antihistamine/nasal steroid)  Review of asthma plan and follow up 4-6 months.    Lilo Bell DO

## 2025-03-26 NOTE — PATIENT INSTRUCTIONS
Improved lung function post albuterol    Will switch to Arnuity from Asmanex due to insurance coverage    Add ipratropium nasal spray as needed    Avoid Shellfish and corn  Follow up in 4-6 months

## 2025-04-01 DIAGNOSIS — J45.909 MILD ASTHMA, UNSPECIFIED WHETHER COMPLICATED, UNSPECIFIED WHETHER PERSISTENT (HHS-HCC): ICD-10-CM

## 2025-04-01 RX ORDER — MOMETASONE FUROATE 220 UG/1
1 INHALANT RESPIRATORY (INHALATION) 2 TIMES DAILY
Qty: 3 EACH | Refills: 3 | Status: SHIPPED | OUTPATIENT
Start: 2025-04-01 | End: 2026-04-01

## 2025-04-09 DIAGNOSIS — M06.041 RHEUMATOID ARTHRITIS INVOLVING BOTH HANDS WITH NEGATIVE RHEUMATOID FACTOR (MULTI): ICD-10-CM

## 2025-04-09 DIAGNOSIS — M06.042 RHEUMATOID ARTHRITIS INVOLVING BOTH HANDS WITH NEGATIVE RHEUMATOID FACTOR (MULTI): ICD-10-CM

## 2025-04-09 RX ORDER — METHOTREXATE 2.5 MG/1
20 TABLET ORAL
Qty: 96 TABLET | Refills: 1 | Status: SHIPPED | OUTPATIENT
Start: 2025-04-13

## 2025-04-14 ENCOUNTER — APPOINTMENT (OUTPATIENT)
Dept: PRIMARY CARE | Facility: CLINIC | Age: 65
End: 2025-04-14
Payer: MEDICARE

## 2025-04-14 VITALS
TEMPERATURE: 97.9 F | WEIGHT: 196 LBS | BODY MASS INDEX: 31.5 KG/M2 | SYSTOLIC BLOOD PRESSURE: 142 MMHG | HEART RATE: 78 BPM | DIASTOLIC BLOOD PRESSURE: 80 MMHG | OXYGEN SATURATION: 96 % | RESPIRATION RATE: 14 BRPM | HEIGHT: 66 IN

## 2025-04-14 DIAGNOSIS — N18.31 STAGE 3A CHRONIC KIDNEY DISEASE (MULTI): ICD-10-CM

## 2025-04-14 DIAGNOSIS — E11.9 TYPE 2 DIABETES MELLITUS WITHOUT COMPLICATION, WITHOUT LONG-TERM CURRENT USE OF INSULIN: ICD-10-CM

## 2025-04-14 DIAGNOSIS — J45.20 MILD INTERMITTENT ASTHMA, UNSPECIFIED WHETHER COMPLICATED (HHS-HCC): ICD-10-CM

## 2025-04-14 DIAGNOSIS — M06.9 RHEUMATOID ARTHRITIS INVOLVING MULTIPLE SITES, UNSPECIFIED WHETHER RHEUMATOID FACTOR PRESENT (MULTI): ICD-10-CM

## 2025-04-14 DIAGNOSIS — I10 PRIMARY HYPERTENSION: ICD-10-CM

## 2025-04-14 DIAGNOSIS — J30.2 SEASONAL ALLERGIES: Primary | ICD-10-CM

## 2025-04-14 LAB — POC HEMOGLOBIN A1C: 6.7 % (ref 4.2–6.5)

## 2025-04-14 PROCEDURE — 4010F ACE/ARB THERAPY RXD/TAKEN: CPT | Performed by: INTERNAL MEDICINE

## 2025-04-14 PROCEDURE — 3008F BODY MASS INDEX DOCD: CPT | Performed by: INTERNAL MEDICINE

## 2025-04-14 PROCEDURE — 1160F RVW MEDS BY RX/DR IN RCRD: CPT | Performed by: INTERNAL MEDICINE

## 2025-04-14 PROCEDURE — 3077F SYST BP >= 140 MM HG: CPT | Performed by: INTERNAL MEDICINE

## 2025-04-14 PROCEDURE — 99214 OFFICE O/P EST MOD 30 MIN: CPT | Performed by: INTERNAL MEDICINE

## 2025-04-14 PROCEDURE — 1159F MED LIST DOCD IN RCRD: CPT | Performed by: INTERNAL MEDICINE

## 2025-04-14 PROCEDURE — 3079F DIAST BP 80-89 MM HG: CPT | Performed by: INTERNAL MEDICINE

## 2025-04-14 PROCEDURE — 83036 HEMOGLOBIN GLYCOSYLATED A1C: CPT | Performed by: INTERNAL MEDICINE

## 2025-04-14 PROCEDURE — 1036F TOBACCO NON-USER: CPT | Performed by: INTERNAL MEDICINE

## 2025-04-14 PROCEDURE — 3044F HG A1C LEVEL LT 7.0%: CPT | Performed by: INTERNAL MEDICINE

## 2025-04-14 PROCEDURE — 1123F ACP DISCUSS/DSCN MKR DOCD: CPT | Performed by: INTERNAL MEDICINE

## 2025-04-14 RX ORDER — TIRZEPATIDE 2.5 MG/.5ML
2.5 INJECTION, SOLUTION SUBCUTANEOUS
Qty: 2 ML | Refills: 0 | Status: SHIPPED | OUTPATIENT
Start: 2025-04-14

## 2025-04-14 RX ORDER — AMLODIPINE BESYLATE 5 MG/1
5 TABLET ORAL DAILY
Qty: 30 TABLET | Refills: 5 | Status: SHIPPED | OUTPATIENT
Start: 2025-04-14 | End: 2025-10-11

## 2025-04-14 ASSESSMENT — PATIENT HEALTH QUESTIONNAIRE - PHQ9
SUM OF ALL RESPONSES TO PHQ9 QUESTIONS 1 AND 2: 0
1. LITTLE INTEREST OR PLEASURE IN DOING THINGS: NOT AT ALL
2. FEELING DOWN, DEPRESSED OR HOPELESS: NOT AT ALL

## 2025-04-14 NOTE — PROGRESS NOTES
"Subjective    Parish Muñoz is a 64 y.o. female who presents for Follow-up.  HPI    6 month fu DM  Doing well.     She has seen the allergist. She is doing well. Breathing is good  Her blood sugars are good.  She is seeing retina specialist this week    Review of Systems   All other systems reviewed and are negative.        Objective     /80 (BP Location: Left arm, Patient Position: Sitting, BP Cuff Size: Adult)   Pulse 78   Temp 36.6 °C (97.9 °F) (Skin)   Resp 14   Ht 1.676 m (5' 6\")   Wt 88.9 kg (196 lb)   LMP  (LMP Unknown)   SpO2 96%   BMI 31.64 kg/m²    Physical Exam  Vitals reviewed.   Constitutional:       General: She is not in acute distress.     Appearance: Normal appearance.   Cardiovascular:      Rate and Rhythm: Normal rate and regular rhythm.      Pulses: Normal pulses.      Heart sounds: Normal heart sounds.   Pulmonary:      Effort: Pulmonary effort is normal.      Breath sounds: Normal breath sounds.   Abdominal:      Tenderness: There is no abdominal tenderness.   Musculoskeletal:         General: No swelling.   Skin:     General: Skin is warm and dry.   Neurological:      Mental Status: She is alert.       Health Maintenance Due   Topic Date Due    MMR Vaccines (1 of 1 - Standard series) Never done    DTaP/Tdap/Td Vaccines (1 - Tdap) Never done    RSV High Risk: (Elderly (60+) or Pregnant Population) (1 - Risk 60-74 years 1-dose series) Never done    Mammogram  06/06/2025          Assessment/Plan   Problem List Items Addressed This Visit       Asthma    Diabetes (Multi)    Relevant Medications    tirzepatide (Mounjaro) 2.5 mg/0.5 mL pen injector    Other Relevant Orders    POCT glycosylated hemoglobin (Hb A1C) manually resulted (Completed)    Hypertension    Relevant Medications    amLODIPine (Norvasc) 5 mg tablet    Rheumatoid arthritis    Seasonal allergies - Primary    Stage 3a chronic kidney disease (Multi)   Add amlodipine. With her asthma will hold off on bb. She has had " trouble with hydrochlorothiazide in the past. Skin condition  Call  with any problems or questions.   follow up in 3 months. Recommend yearly eye exams and self foot exams. call if any problems or questions.

## 2025-04-23 NOTE — PROGRESS NOTES
Subjective   Patient ID: [id number]   Parish Muñoz is a 64 y.o. female with OP with vertebral fx, seronegative RA, asthma, obesity (BMI 33< 35), HLD, HTN, and DM, presents for follow up     Current rheum meds:  - HCQ 400mg daily  - MTX 20mg (8 tabs) weekly (on Fridays), folic acid   - Vitamin D/Citracal daily    Prior rheum meds:  - Alendronate for 2 years ([id number]), stopped by her provider as she had improvement in BMD    HPI  Saw PCP, back pain, xray L spine showed L fractures, old -> PT for low back  Numbness of the left foot -> EMG -> mild left L5 radiculopathy   Doing PT, helping   Saw allergy/immunology, changed meds  Saw Retina specialist last week, improvement   Doing well overall  No joint pains or swelling  MS of 15 minutes     Last DEXA:  DEXA 10/24:    LEFT FEMUR - TOTAL BMDDensity: 1.137 grams/centimeter squared T-Score 1.0  LEFT FEMUR - NECk BMD: 1.043 grams/centimeter squared T-Score 0.0  SPINE L1-L4 BMD: 1.217 g per cm squared T-Score 0.3    10-year Fracture Risk:  Major Osteoporotic Fracture  8.3%  Hip Fracture                        0.3%    Colonoscopy -> diverticulosis, hemorrhoids, polyp ( tubular adenoma)  Using cornea care for her eyes, helping a lot with the dryness   Rash on her face, saw derm, has seborrheic dermatitis, using topicals   Patient is losing weight and had a drop in her A1c  Wears arthritis gloves to bed, helps keep them warm and no pain, less in the warm weather    + dry eyes     Complaints with med  No side effects reported  No rashes  No infections  No eye inflammation  No chest pain or dyspnea   No fatigue  No hematochezia or melena    History of falls / fractures: no  Loss of height: no  Tobacco: no  Alcohol: no  Vitamin D supplementation: yes  Calcium supplementation: yes  Weight bearing exercise: yes  Previous or planned invasive jaw surgery: no  History of radiation: no  Chronic steroid use: no  History of RA: yes  GERD: yes  Dillan-en-y: no  CKD / GFR <30:  no  Parental hip fracture: no    ROS:  As per HPI     Rheum hx (Recall from Dr. Jimenez's notes):  At age of 52, presented with complaints of bilateral hand stiffness in morning with associated pain and intermittent joint swelling. Lab testing was negative but felt to have seronegative RA. She was started on MTX and tapered up to 20mg weekly with addition of HCQ by Dr. Ramsey.      Current treatment:HCQ 400mg daily, MTX 20mg (8 tabs) weekly (on Fridays), folic acid   No side effects reported from meds. Gets annual eye examination- last done 8/2/2022     Osteoporosis : Traumatic fall from 2 step ladder resulted in vertebral fracture in 2018 (was in [place] at the time). Took Alendronate for 2 years ([id number]), stopped by her provider as she had improvement in BMD. She takes daily vitamin D/Citracal. No systemic steroid use. Menopause at age of 52-53. No hx of radiation. Sees dentist regularly.     PSH: back surgery (for herniated disc)  Social hx: Smoking: none, ETOH: none, Recreational drugs: none  FMH: none for rheumatic disease, she has no kids. Lives by herself,  passed away in 2018. Her sister lives next door    Patient Active Problem List   Diagnosis    Anemia    Asthma    Diabetes (Multi)    Dry eye    HLD (hyperlipidemia)    Hypertension    Long term methotrexate user    Osteoporosis    Rheumatoid arthritis    Seasonal allergies    Stage 3a chronic kidney disease (Multi)    Vitamin B12 deficiency    Sciatica of left side      Past Medical History:   Diagnosis Date    Asthma     Diabetes (Multi)     H/O rheumatoid arthritis     Hyperlipidemia     Hypertension     Seasonal allergies       Past Surgical History:   Procedure Laterality Date    OTHER SURGICAL HISTORY  07/27/2020    Back surgery    OTHER SURGICAL HISTORY  07/27/2020    Lipoma excision    OTHER SURGICAL HISTORY  07/27/2020    Tonsillectomy with adenoidectomy      Social History     Socioeconomic History    Marital status:      Spouse  name: Not on file    Number of children: Not on file    Years of education: Not on file    Highest education level: Not on file   Occupational History    Not on file   Tobacco Use    Smoking status: Never    Smokeless tobacco: Never   Vaping Use    Vaping status: Never Used   Substance and Sexual Activity    Alcohol use: Never    Drug use: Never    Sexual activity: Not on file   Other Topics Concern    Not on file   Social History Narrative    Not on file     Social Drivers of Health     Financial Resource Strain: Not on file   Food Insecurity: Not on file   Transportation Needs: Not on file   Physical Activity: Not on file   Stress: Not on file   Social Connections: Not on file   Intimate Partner Violence: Not on file   Housing Stability: Not on file     Allergies   Allergen Reactions    Esomeprazole Other    Naproxen Sodium Other    Shellfish Derived Other    [place] Rash     Current Outpatient Medications:     amLODIPine (Norvasc) 5 mg tablet, Take 1 tablet (5 mg) by mouth once daily., Disp: 30 tablet, Rfl: 5    ascorbic acid (Vitamin C) 1,000 mg tablet, Take by mouth., Disp: , Rfl:     aspirin 81 mg chewable tablet, Chew., Disp: , Rfl:     calcium-D3-zinc-copper-babar 325 mg-12.5 mcg -2.75 mg tablet, Take by mouth., Disp: , Rfl:     cetirizine (ZyrTEC) 10 mg tablet, Take 1 tablet (10 mg) by mouth once daily., Disp: , Rfl:     cholecalciferol (Vitamin D-3) 25 MCG (1000 UT) tablet, Take by mouth., Disp: , Rfl:     cranberry extract 200 mg capsule, Take by mouth., Disp: , Rfl:     cyanocobalamin, vitamin B-12, (Vitamin B-12) 1,000 mcg tablet extended release, Take 1 tablet (1,000 mcg) by mouth once daily., Disp: , Rfl:     ferrous sulfate 325 (65 Fe) MG EC tablet, Take 65 mg by mouth once daily with breakfast. Do not crush, chew, or split., Disp: , Rfl:     fish oil concentrate (Omega-3) 120-180 mg capsule, Take by mouth., Disp: , Rfl:     fluticasone furoate (Arnuity Ellipta) 100 mcg/actuation inhaler, Inhale 1 puff  once daily. Rinse mouth with water after use to reduce aftertaste and incidence of candidiasis. Do not swallow., Disp: 1 each, Rfl: 1    folic acid (Folvite) 1 mg tablet, Take 0.4 mg by mouth once daily., Disp: , Rfl:     hydrOXYzine HCL (Atarax) 25 mg tablet, Take 1 tablet (25 mg) by mouth once daily at bedtime., Disp: 90 tablet, Rfl: 3    ipratropium (Atrovent) 21 mcg (0.03 %) nasal spray, Administer 2 sprays into each nostril every 12 hours., Disp: 30 mL, Rfl: 12    lansoprazole (Prevacid) 15 mg DR capsule, Take 1 capsule (15 mg) by mouth once daily in the morning. Take before meals., Disp: , Rfl:     levalbuterol (Xopenex) 45 mcg/actuation inhaler, Inhale 1 puff every 4 hours if needed for wheezing or shortness of breath., Disp: 15 g, Rfl: 3    losartan (Cozaar) 100 mg tablet, Take 1 tablet (100 mg) by mouth once daily., Disp: 90 tablet, Rfl: 3    metFORMIN (Glucophage) 1,000 mg tablet, Take 1 tablet (1,000 mg) by mouth 2 times daily (morning and late afternoon)., Disp: 180 tablet, Rfl: 3    methotrexate (Trexall) 2.5 mg tablet, TAKE 8 TABLETS BY MOUTH ONCE A WEEK, Disp: 96 tablet, Rfl: 1    metoprolol succinate XL (Toprol-XL) 50 mg 24 hr tablet, Take 1 tablet (50 mg) by mouth once daily., Disp: 90 tablet, Rfl: 3    mometasone (Asmanex Twisthaler) 220 mcg/ actuation (60) inhaler, Inhale 1 puff 2 times a day., Disp: 3 each, Rfl: 3    simvastatin (Zocor) 10 mg tablet, Take 1 tablet (10 mg) by mouth once daily at bedtime., Disp: 90 tablet, Rfl: 3    tirzepatide (Mounjaro) 2.5 mg/0.5 mL pen injector, Inject 2.5 mg under the skin 1 (one) time per week., Disp: 2 mL, Rfl: 0     Objective   Visit Vitals  /81   Pulse 84   Temp 36.7 °C (98 °F)   Resp 20     Physical Exam:  General: AAOx3, Cooperative  Skin: No rashes, ulcers or photosensitive areas  MSK: Upper Extremities:  Hand/Fingers: No erythema, edema, tenderness or warmth at DIP, PIP, or MCP joints, FROM grossly. Good hand . No nodules. No deformities    Wrists: No erythema, edema, warmth or tenderness at wrist, FROM grossly  Elbows: No tenderness, edema, erythema or warmth at elbows, FROM grossly. No nodules   Shoulders: No edema, erythema, tenderness or warmth at shoulders. FROM  Lower Extremities:   Hips: No obvious deformities. No joint tenderness, normal ROM grossly. No trochanteric bursae TTP  Knees: No tenderness, deformities, edema, rashes, or warmth, normal ROM grossly. No crepitus, no pes anserine bursa TTP   Ankles, feet: No deformities, tenderness, edema, erythema, ulceration, or warmth at the ankle or MTP/IP joints, normal ROM grossly  Spine: No spinal tenderness to palpation. No SI joint tenderness    Lab Results   Component Value Date    WBC 4.8 04/30/2025    HGB 12.8 04/30/2025    HCT 39.7 04/30/2025    MCV 92.8 04/30/2025     04/30/2025        Chemistry    Lab Results   Component Value Date/Time     04/30/2025 0956    K 4.5 04/30/2025 0956     04/30/2025 0956    CO2 28 04/30/2025 0956    BUN 16 04/30/2025 0956    CREATININE 1.02 04/30/2025 0956    Lab Results   Component Value Date/Time    CALCIUM 10.2 04/30/2025 0956    ALKPHOS 45 04/30/2025 0956    AST 41 (H) 04/30/2025 0956    ALT 31 (H) 04/30/2025 0956    BILITOT 0.5 04/30/2025 0956        Lab Results   Component Value Date    ALT 31 (H) 04/30/2025    AST 41 (H) 04/30/2025    ALKPHOS 45 04/30/2025    BILITOT 0.5 04/30/2025      Lab Results   Component Value Date    CALCIUM 10.2 04/30/2025      Lab Results   Component Value Date    SPEP NORMAL 12/14/2022      EGD biopsies in 8/2023:  A.  2ND PORTION DUODENUM:    -- SMALL BOWEL MUCOSA WITH NO SIGNIFICANT PATHOLOGIC CHANGE.   -- NO MORPHOLOGIC EVIDENCE OF CELIAC DISEASE.     B.  GASTRITIS:    -- GASTRIC ANTRAL-TYPE MUCOSA WITH MILD REACTIVE EPITHELIAL CHANGE AND FOCAL   GOBLET CELL METAPLASIA.   -- NO HELICOBACTER PYLORI IDENTIFIED ON ROUTINE STAINED SLIDES.     DEXA 10/12/22:    LEFT FEMUR - TOTAL BMD: 1.144 g/cm2 T-score :  1.1    LEFT FEMUR - NECk BMD: 0.994 g/cm2 T-score : -0.3   SPINE L1-L4 BMD 1.223 g/cm2 T-score : 0.4    DEXA 10/24:    LEFT FEMUR - TOTAL BMD: 1.137 grams/centimeter squared T-Score 1.0  LEFT FEMUR - NECk BMD: 1.043 grams/centimeter squared T-Score 0.0  SPINE L1-L4 BMD: 1.217 g per cm squared T-Score 0.3    10-year Fracture Risk:  Major Osteoporotic Fracture  8.3%  Hip Fracture                        0.3%      Assessment/Plan    This is a 65 yo F with osteoporosis with vertebral fx (now normal BMD) and seronegative RA, presents for follow up  Last seen in 10/24     Labs:  5/24: ALT 31, AST 41, rest of CMP, CBC, CRP, ESR wnl  10/24: WBCs 4.1K, ALC 1.01, rest of CBC, CMP, ESR, CRP, vitamin D wnl  4/24: CBC, CMP, CRP, ESR, Vitamin D wnl  1/24: iron studies low -> normal, Hb 11.7, rest of CBC, TSH wnl  10/23:  AST 42, Na 146, WBC 4.3, Hg 10.8, hep C neg  12/22: SPEP no monoclonal gammopathy, Vitamin D 67.   8/3/22: RF< 10, CCP <1, CRP 0.28, SSA/SSB negative ESR 6 (always normal)     Imaging:  Refer to above DEXA    # Seronegative RA, well controlled. No extra-articular manifestations. No active joints. CDAI 1  # Long term use of HCQ  # Long term use of Methotrexate   # OP, vertebral fx in 2018. Prior tx: Alendronate for 2 years ([id number]). Repeat DEXA 10/24 with normal BMD. Monitoring off tx  # Anemia, resolved  # Slightly elevated LFTs     - Continue HCQ 400mg daily  - Reduce methotrexate to 7 pills weekly due to elevated LFTs  - Labs before next mari (CBC, CMP, ESR, CPR)  - Follow up with ophtho, last mari in 4/25, retina is good   - Vaccines UTD including influenza, 4 doses of COVID 19, pna in 2017, shingrex 2021  - Continue Vitamin D, 1 pill every other day  - Continue calcium  - Weight bearing exercise as tolerated  - Repeat DEXA due in 10/27    RTC in 6 months     Plan, including risks and benefits, was discussed with the patient, informed on how to reach us.     To schedule an appointment, call [phone]  To  reach the rheumatology office, call [phone]    Aida Riley MD   Division of Rheumatology  [facility]

## 2025-05-01 LAB
ALBUMIN SERPL-MCNC: 4.4 G/DL (ref 3.6–5.1)
ALP SERPL-CCNC: 45 U/L (ref 37–153)
ALT SERPL-CCNC: 31 U/L (ref 6–29)
ANION GAP SERPL CALCULATED.4IONS-SCNC: 10 MMOL/L (CALC) (ref 7–17)
AST SERPL-CCNC: 41 U/L (ref 10–35)
BASOPHILS # BLD AUTO: 29 CELLS/UL (ref 0–200)
BASOPHILS NFR BLD AUTO: 0.6 %
BILIRUB SERPL-MCNC: 0.5 MG/DL (ref 0.2–1.2)
BUN SERPL-MCNC: 16 MG/DL (ref 7–25)
CALCIUM SERPL-MCNC: 10.2 MG/DL (ref 8.6–10.4)
CHLORIDE SERPL-SCNC: 101 MMOL/L (ref 98–110)
CO2 SERPL-SCNC: 28 MMOL/L (ref 20–32)
CREAT SERPL-MCNC: 1.02 MG/DL (ref 0.5–1.05)
CRP SERPL-MCNC: <3 MG/L
EGFRCR SERPLBLD CKD-EPI 2021: 61 ML/MIN/1.73M2
EOSINOPHIL # BLD AUTO: 230 CELLS/UL (ref 15–500)
EOSINOPHIL NFR BLD AUTO: 4.8 %
ERYTHROCYTE [DISTWIDTH] IN BLOOD BY AUTOMATED COUNT: 16 % (ref 11–15)
ERYTHROCYTE [SEDIMENTATION RATE] IN BLOOD BY WESTERGREN METHOD: 6 MM/H
GLUCOSE SERPL-MCNC: 119 MG/DL (ref 65–99)
HCT VFR BLD AUTO: 39.7 % (ref 35–45)
HGB BLD-MCNC: 12.8 G/DL (ref 11.7–15.5)
LYMPHOCYTES # BLD AUTO: 1090 CELLS/UL (ref 850–3900)
LYMPHOCYTES NFR BLD AUTO: 22.7 %
MCH RBC QN AUTO: 29.9 PG (ref 27–33)
MCHC RBC AUTO-ENTMCNC: 32.2 G/DL (ref 32–36)
MCV RBC AUTO: 92.8 FL (ref 80–100)
MONOCYTES # BLD AUTO: 384 CELLS/UL (ref 200–950)
MONOCYTES NFR BLD AUTO: 8 %
NEUTROPHILS # BLD AUTO: 3067 CELLS/UL (ref 1500–7800)
NEUTROPHILS NFR BLD AUTO: 63.9 %
PLATELET # BLD AUTO: 287 THOUSAND/UL (ref 140–400)
PMV BLD REES-ECKER: 11 FL (ref 7.5–12.5)
POTASSIUM SERPL-SCNC: 4.5 MMOL/L (ref 3.5–5.3)
PROT SERPL-MCNC: 6.6 G/DL (ref 6.1–8.1)
RBC # BLD AUTO: 4.28 MILLION/UL (ref 3.8–5.1)
SODIUM SERPL-SCNC: 139 MMOL/L (ref 135–146)
WBC # BLD AUTO: 4.8 THOUSAND/UL (ref 3.8–10.8)

## 2025-05-06 ENCOUNTER — APPOINTMENT (OUTPATIENT)
Dept: RHEUMATOLOGY | Facility: CLINIC | Age: 65
End: 2025-05-06
Payer: MEDICARE

## 2025-05-06 VITALS
BODY MASS INDEX: 31.64 KG/M2 | DIASTOLIC BLOOD PRESSURE: 81 MMHG | HEART RATE: 84 BPM | SYSTOLIC BLOOD PRESSURE: 170 MMHG | TEMPERATURE: 98 F | RESPIRATION RATE: 20 BRPM | WEIGHT: 196 LBS

## 2025-05-06 DIAGNOSIS — D64.9 ANEMIA, UNSPECIFIED TYPE: ICD-10-CM

## 2025-05-06 DIAGNOSIS — Z79.631 ON METHOTREXATE THERAPY: ICD-10-CM

## 2025-05-06 DIAGNOSIS — M85.80 OSTEOPENIA, UNSPECIFIED LOCATION: ICD-10-CM

## 2025-05-06 DIAGNOSIS — M06.041 RHEUMATOID ARTHRITIS INVOLVING BOTH HANDS WITH NEGATIVE RHEUMATOID FACTOR (MULTI): Primary | ICD-10-CM

## 2025-05-06 DIAGNOSIS — Z79.899 LONG-TERM USE OF HYDROXYCHLOROQUINE: ICD-10-CM

## 2025-05-06 DIAGNOSIS — M06.042 RHEUMATOID ARTHRITIS INVOLVING BOTH HANDS WITH NEGATIVE RHEUMATOID FACTOR (MULTI): Primary | ICD-10-CM

## 2025-05-06 DIAGNOSIS — R79.89 ELEVATED LFTS: ICD-10-CM

## 2025-05-06 PROCEDURE — 3077F SYST BP >= 140 MM HG: CPT | Performed by: STUDENT IN AN ORGANIZED HEALTH CARE EDUCATION/TRAINING PROGRAM

## 2025-05-06 PROCEDURE — 1123F ACP DISCUSS/DSCN MKR DOCD: CPT | Performed by: STUDENT IN AN ORGANIZED HEALTH CARE EDUCATION/TRAINING PROGRAM

## 2025-05-06 PROCEDURE — 1160F RVW MEDS BY RX/DR IN RCRD: CPT | Performed by: STUDENT IN AN ORGANIZED HEALTH CARE EDUCATION/TRAINING PROGRAM

## 2025-05-06 PROCEDURE — 1159F MED LIST DOCD IN RCRD: CPT | Performed by: STUDENT IN AN ORGANIZED HEALTH CARE EDUCATION/TRAINING PROGRAM

## 2025-05-06 PROCEDURE — 3044F HG A1C LEVEL LT 7.0%: CPT | Performed by: STUDENT IN AN ORGANIZED HEALTH CARE EDUCATION/TRAINING PROGRAM

## 2025-05-06 PROCEDURE — 4010F ACE/ARB THERAPY RXD/TAKEN: CPT | Performed by: STUDENT IN AN ORGANIZED HEALTH CARE EDUCATION/TRAINING PROGRAM

## 2025-05-06 PROCEDURE — G2211 COMPLEX E/M VISIT ADD ON: HCPCS | Performed by: STUDENT IN AN ORGANIZED HEALTH CARE EDUCATION/TRAINING PROGRAM

## 2025-05-06 PROCEDURE — 3079F DIAST BP 80-89 MM HG: CPT | Performed by: STUDENT IN AN ORGANIZED HEALTH CARE EDUCATION/TRAINING PROGRAM

## 2025-05-06 PROCEDURE — 99214 OFFICE O/P EST MOD 30 MIN: CPT | Performed by: STUDENT IN AN ORGANIZED HEALTH CARE EDUCATION/TRAINING PROGRAM

## 2025-05-09 DIAGNOSIS — E11.9 TYPE 2 DIABETES MELLITUS WITHOUT COMPLICATION, WITHOUT LONG-TERM CURRENT USE OF INSULIN: ICD-10-CM

## 2025-05-09 RX ORDER — TIRZEPATIDE 5 MG/.5ML
5 INJECTION, SOLUTION SUBCUTANEOUS WEEKLY
Qty: 2 ML | Refills: 3 | Status: SHIPPED | OUTPATIENT
Start: 2025-05-09

## 2025-06-09 DIAGNOSIS — E11.9 TYPE 2 DIABETES MELLITUS WITHOUT COMPLICATION, WITHOUT LONG-TERM CURRENT USE OF INSULIN: ICD-10-CM

## 2025-06-10 RX ORDER — TIRZEPATIDE 5 MG/.5ML
5 INJECTION, SOLUTION SUBCUTANEOUS WEEKLY
Qty: 2 ML | Refills: 3 | Status: SHIPPED | OUTPATIENT
Start: 2025-06-10

## 2025-07-08 DIAGNOSIS — M06.042 RHEUMATOID ARTHRITIS INVOLVING BOTH HANDS WITH NEGATIVE RHEUMATOID FACTOR (MULTI): Primary | ICD-10-CM

## 2025-07-08 DIAGNOSIS — M06.041 RHEUMATOID ARTHRITIS INVOLVING BOTH HANDS WITH NEGATIVE RHEUMATOID FACTOR (MULTI): Primary | ICD-10-CM

## 2025-07-08 DIAGNOSIS — Z79.899 LONG-TERM USE OF HYDROXYCHLOROQUINE: ICD-10-CM

## 2025-07-08 RX ORDER — HYDROXYCHLOROQUINE SULFATE 200 MG/1
400 TABLET, FILM COATED ORAL DAILY
Qty: 180 TABLET | Refills: 1 | Status: SHIPPED | OUTPATIENT
Start: 2025-07-08 | End: 2026-01-04

## 2025-07-17 ENCOUNTER — APPOINTMENT (OUTPATIENT)
Dept: PRIMARY CARE | Facility: CLINIC | Age: 65
End: 2025-07-17
Payer: MEDICARE

## 2025-07-17 VITALS
HEIGHT: 66 IN | RESPIRATION RATE: 14 BRPM | WEIGHT: 180 LBS | BODY MASS INDEX: 28.93 KG/M2 | TEMPERATURE: 97.9 F | SYSTOLIC BLOOD PRESSURE: 130 MMHG | DIASTOLIC BLOOD PRESSURE: 82 MMHG

## 2025-07-17 DIAGNOSIS — I10 PRIMARY HYPERTENSION: ICD-10-CM

## 2025-07-17 DIAGNOSIS — E11.9 TYPE 2 DIABETES MELLITUS WITHOUT COMPLICATION, WITHOUT LONG-TERM CURRENT USE OF INSULIN: ICD-10-CM

## 2025-07-17 DIAGNOSIS — E78.2 MIXED HYPERLIPIDEMIA: Primary | ICD-10-CM

## 2025-07-17 LAB — POC HEMOGLOBIN A1C: 5.5 % (ref 4.2–6.5)

## 2025-07-17 PROCEDURE — 4010F ACE/ARB THERAPY RXD/TAKEN: CPT | Performed by: INTERNAL MEDICINE

## 2025-07-17 PROCEDURE — 83036 HEMOGLOBIN GLYCOSYLATED A1C: CPT | Performed by: INTERNAL MEDICINE

## 2025-07-17 PROCEDURE — 1159F MED LIST DOCD IN RCRD: CPT | Performed by: INTERNAL MEDICINE

## 2025-07-17 PROCEDURE — 3044F HG A1C LEVEL LT 7.0%: CPT | Performed by: INTERNAL MEDICINE

## 2025-07-17 PROCEDURE — 3079F DIAST BP 80-89 MM HG: CPT | Performed by: INTERNAL MEDICINE

## 2025-07-17 PROCEDURE — 3075F SYST BP GE 130 - 139MM HG: CPT | Performed by: INTERNAL MEDICINE

## 2025-07-17 PROCEDURE — 3008F BODY MASS INDEX DOCD: CPT | Performed by: INTERNAL MEDICINE

## 2025-07-17 PROCEDURE — 99214 OFFICE O/P EST MOD 30 MIN: CPT | Performed by: INTERNAL MEDICINE

## 2025-07-17 NOTE — PROGRESS NOTES
"Subjective    Parish Muñoz is a 64 y.o. female who presents for Diabetes.  HPI    3 month fu DM   Doesn't check blood sugars  Not tolerating Mounjaro  Ongoing nausea, upset stomach.   Increased fatigue   She has been getting Factor Meals  Review of Systems   All other systems reviewed and are negative.        Objective     /82 (BP Location: Left arm, Patient Position: Sitting, BP Cuff Size: Adult)   Temp 36.6 °C (97.9 °F) (Skin)   Resp 14   Ht 1.676 m (5' 6\")   Wt 81.6 kg (180 lb)   LMP  (LMP Unknown)   BMI 29.05 kg/m²    Physical Exam  Vitals reviewed.   Constitutional:       General: She is not in acute distress.     Appearance: Normal appearance.     Cardiovascular:      Rate and Rhythm: Normal rate and regular rhythm.      Pulses: Normal pulses.      Heart sounds: Normal heart sounds.   Pulmonary:      Effort: Pulmonary effort is normal.      Breath sounds: Normal breath sounds.   Abdominal:      Tenderness: There is no abdominal tenderness.     Musculoskeletal:         General: No swelling.     Skin:     General: Skin is warm and dry.     Neurological:      Mental Status: She is alert.       Health Maintenance Due   Topic Date Due    MMR Vaccines (1 of 1 - Standard series) Never done    DTaP/Tdap/Td Vaccines (1 - Tdap) Never done    RSV High Risk: (Elderly (60+) or Pregnant Population) (1 - Risk 60-74 years 1-dose series) Never done    COVID-19 Vaccine (8 - Pfizer risk 2024-25 season) 04/02/2025    Mammogram  06/06/2025    Diabetes: Retinopathy Screening  09/10/2025          Assessment/Plan   Problem List Items Addressed This Visit       Diabetes (Multi)    Relevant Orders    Albumin-Creatinine Ratio, Urine Random    POCT glycosylated hemoglobin (Hb A1C) manually resulted (Completed)    HLD (hyperlipidemia) - Primary    Hypertension   Her sx with Mounjaro are severe. Will dc   Her blood sugar, weight and her blood pressure are good.   Call  with any problems or questions.   follow up in 3 " months. Recommend yearly eye exams and self foot exams. call if any problems or questions.   Recommend Mediterranean type diet with healthy protein, vegetables, and healthy fats being main source of fuel. Sugars and highly processed carbs sparingly  Recommend exercise program that includes both cardio and weight training to maintain healthy lifestyle

## 2025-07-18 LAB
ALBUMIN/CREAT UR: 14 MG/G CREAT
CREAT UR-MCNC: 74 MG/DL (ref 20–275)
MICROALBUMIN UR-MCNC: 1 MG/DL

## 2025-09-09 ENCOUNTER — APPOINTMENT (OUTPATIENT)
Dept: ALLERGY | Facility: CLINIC | Age: 65
End: 2025-09-09
Payer: MEDICARE

## 2025-11-03 ENCOUNTER — APPOINTMENT (OUTPATIENT)
Dept: PRIMARY CARE | Facility: CLINIC | Age: 65
End: 2025-11-03
Payer: MEDICARE

## 2025-11-18 ENCOUNTER — APPOINTMENT (OUTPATIENT)
Dept: RHEUMATOLOGY | Facility: CLINIC | Age: 65
End: 2025-11-18
Payer: MEDICARE

## 2026-01-13 ENCOUNTER — APPOINTMENT (OUTPATIENT)
Dept: RHEUMATOLOGY | Facility: CLINIC | Age: 66
End: 2026-01-13
Payer: MEDICARE